# Patient Record
Sex: FEMALE | Race: WHITE | NOT HISPANIC OR LATINO | Employment: OTHER | URBAN - METROPOLITAN AREA
[De-identification: names, ages, dates, MRNs, and addresses within clinical notes are randomized per-mention and may not be internally consistent; named-entity substitution may affect disease eponyms.]

---

## 2017-01-09 ENCOUNTER — HOSPITAL ENCOUNTER (OUTPATIENT)
Dept: RADIOLOGY | Facility: HOSPITAL | Age: 52
Discharge: HOME | End: 2017-01-09
Attending: INTERNAL MEDICINE
Payer: COMMERCIAL

## 2017-01-09 ENCOUNTER — OFFICE VISIT (OUTPATIENT)
Dept: INTERNAL MEDICINE | Facility: CLINIC | Age: 52
End: 2017-01-09
Payer: COMMERCIAL

## 2017-01-09 VITALS
HEIGHT: 62 IN | BODY MASS INDEX: 31.97 KG/M2 | HEART RATE: 76 BPM | RESPIRATION RATE: 18 BRPM | DIASTOLIC BLOOD PRESSURE: 92 MMHG | SYSTOLIC BLOOD PRESSURE: 134 MMHG | WEIGHT: 173.75 LBS

## 2017-01-09 DIAGNOSIS — Z85.3 HX OF BREAST CANCER: ICD-10-CM

## 2017-01-09 DIAGNOSIS — E83.119 HEMOCHROMATOSIS, UNSPECIFIED HEMOCHROMATOSIS TYPE: ICD-10-CM

## 2017-01-09 DIAGNOSIS — E78.5 HYPERLIPIDEMIA, UNSPECIFIED HYPERLIPIDEMIA TYPE: ICD-10-CM

## 2017-01-09 DIAGNOSIS — M25.551 RIGHT HIP PAIN: Primary | ICD-10-CM

## 2017-01-09 DIAGNOSIS — F41.9 ANXIETY: ICD-10-CM

## 2017-01-09 DIAGNOSIS — G20.A1 PARKINSON DISEASE: ICD-10-CM

## 2017-01-09 DIAGNOSIS — M25.551 RIGHT HIP PAIN: ICD-10-CM

## 2017-01-09 PROCEDURE — 99999 PR PBB SHADOW E&M-EST. PATIENT-LVL III: CPT | Mod: PBBFAC,,, | Performed by: INTERNAL MEDICINE

## 2017-01-09 PROCEDURE — 73502 X-RAY EXAM HIP UNI 2-3 VIEWS: CPT | Mod: TC,RT

## 2017-01-09 PROCEDURE — 1159F MED LIST DOCD IN RCRD: CPT | Mod: S$GLB,,, | Performed by: INTERNAL MEDICINE

## 2017-01-09 PROCEDURE — 73502 X-RAY EXAM HIP UNI 2-3 VIEWS: CPT | Mod: 26,RT,, | Performed by: RADIOLOGY

## 2017-01-09 PROCEDURE — 99214 OFFICE O/P EST MOD 30 MIN: CPT | Mod: S$GLB,,, | Performed by: INTERNAL MEDICINE

## 2017-01-09 RX ORDER — ALPRAZOLAM 0.5 MG/1
0.5 TABLET ORAL NIGHTLY PRN
Qty: 30 TABLET | Refills: 0 | Status: SHIPPED | OUTPATIENT
Start: 2017-01-09 | End: 2018-01-23 | Stop reason: SDUPTHER

## 2017-01-09 NOTE — MR AVS SNAPSHOT
[facility]  [address] [facility] [address]  Phone: [phone]  Fax: [phone]                  Rachel Mann Mel   2017 1:00 PM   Office Visit    Description:  Female : 1965   Provider:  Cata Leal MD   Department:  [facility]           Reason for Visit     Follow-up     Hip Pain           Diagnoses this Visit        Comments    Right hip pain    -  Primary     Anxiety         Parkinson disease         Hyperlipidemia, unspecified hyperlipidemia type         Hx of breast cancer                To Do List           Future Appointments        Provider Department Dept Phone    2017 1:30 PM [facility] XR1 [facility] - X-Ray [phone]    2017 4:00 PM LAB, [facility] [phone]    3/21/2017 2:45 PM Julius Henry MD [facility] - Neurology [phone]    7/10/2017 10:15 AM Cata Leal MD [facility] [phone]      Goals (5 Years of Data)     None      Follow-Up and Disposition     Return in about 6 months (around 2017).       These Medications        Disp Refills Start End    alprazolam (XANAX) 0.5 MG tablet 30 tablet 0 2017     Take 1 tablet (0.5 mg total) by mouth nightly as needed. - Oral    Pharmacy: [facility][id number] - [place], [place] - [address] [facility] [address] [facility] [address] Ph #: [phone]       Notes to Pharmacy: This request is for a new prescription for a controlled substance as required by Federal/State law.      [facility]     [facility] On Call Nurse Care Line -  Assistance  Registered nurses in the [facility] provide clinical advisement, health education, appointment booking, and other advisory services.  Call for this free service at [phone].             Medications           Message regarding Medications     Verify the changes and/or additions to your medication regime listed below are the same as discussed with your clinician today.  If  "any of these changes or additions are incorrect, please notify your healthcare provider.        STOP taking these medications     rotigotine (NEUPRO) 1 mg/24 hour PT24 Place 1 patch onto the skin once daily.           Verify that the below list of medications is an accurate representation of the medications you are currently taking.  If none reported, the list may be blank. If incorrect, please contact your healthcare provider. Carry this list with you in case of emergency.           Current Medications     alprazolam (XANAX) 0.5 MG tablet Take 1 tablet (0.5 mg total) by mouth nightly as needed.    anastrozole (ARIMIDEX) 1 mg Tab Take 1 mg by mouth once daily.    carbidopa-levodopa  mg (SINEMET)  mg per tablet Take one in the am for 3 weeks, then one BID    denosumab (XGEVA) 120 mg/1.7 mL (70 mg/mL) Soln Inject 120 mg into the skin. Every 3 months    OMEGA-3S/DHA/EPA/FISH OIL (OMEGA 3 ORAL) Take 2 tablets by mouth once daily.    UNABLE TO FIND Matrix 5000 mcg for hair, skin and nails.   Take .5 tablet daily    venlafaxine (EFFEXOR) 75 MG tablet Take 75 mg by mouth once daily.    vitamin D 1000 units Tab Take 2,000 mg by mouth once daily.           Clinical Reference Information           Vital Signs - Last Recorded  Most recent update: 1/9/2017  1:02 PM by Laurel Keene LPN    BP Pulse Resp Ht Wt LMP    (!) 134/92 76 18 5' 2" (1.575 m) 78.8 kg (173 lb 11.6 oz) 01/17/2012    BMI                31.77 kg/m2          Blood Pressure          Most Recent Value    BP  (!)  134/92      Allergies as of 1/9/2017     Neupro [Rotigotine]      Immunizations Administered on Date of Encounter - 1/9/2017     None      Orders Placed During Today's Visit     Future Labs/Procedures Expected by Expires    CBC auto differential  1/9/2017 (Approximate) 2/8/2017    Comprehensive metabolic panel  1/9/2017 (Approximate) 2/8/2017    Lipid panel  1/9/2017 (Approximate) 2/8/2017    T4, free  1/9/2017 (Approximate) 2/8/2017    " TSH  1/9/2017 (Approximate) 2/8/2017    Vitamin B12  1/9/2017 3/10/2018    Vitamin D  1/9/2017 3/10/2018    X-Ray Hip 2 View Right  1/9/2017 1/9/2018

## 2017-01-09 NOTE — PROGRESS NOTES
Subjective:       Patient ID: Rachel Leal is a 51 y.o. female.    Chief Complaint: Follow-up and Hip Pain (right hip and back pain only while sleeping.)      HPI:  Patient is known to me and presents for follow up DNA positive hemochromotosis, h/o breast cancer and anxiety.  Labs will be done today.    Today she c/o right hip and back pain. Only occurs while sleeping. Pain located on low right back. Once she starts walking the pain gets better.    H/o breast cancer with metastasis to bone (stage VI at diagnosis): dx 2013. Follows with Dr. Gerardo. On arimidex and xgeva; s/p left mastectomy. Doing CT scans with oncology, no MMG. she did follow with her oncologist recently. Did bone scan and no mets int he right hip. Pain is burning and stabbing sensation. Intermittent. Pain lasts for several minutes until she moves. She uses Aleve occasionally which is helpful.    Anxiety: Effexor and Xanax currently. Mood is well controlled. Denies depressed mood. Denies SI. Using Xanax 2-3x a week for sleep.     HLD: taking omega 3 OTC. Last cholesterol was good. Will repeat today.    Parkinson's: she is taking sinement. She was given neuopro but resulted in cellulitis that responded well to antibx given at urgent care. Stopped the patch and hasn't had any further reactions.     Hemochromotosis: diagnosed with positive DNA analysis but no sympotms. Ferritin 396 9/2015 (reviewed and discussed with patient today). She does follow with hepatology. Last US 12/2015 showed fatty infiltration otherwise normal. Last AST/ALT normal, normal bilirubin. No history of blood sugar problems.    Past Medical History   Diagnosis Date    Cancer      breast stage iv    Hereditary hemochromatosis 12/16/2015    History of TMJ syndrome     Hyperlipidemia     Hypoglycemia        Family History   Problem Relation Age of Onset    Colon cancer Mother     Diabetes Father     Heart disease Father     Hemochromatosis Brother     Breast cancer  Neg Hx     Ovarian cancer Neg Hx        Social History     Social History    Marital status:      Spouse name: N/A    Number of children: N/A    Years of education: N/A     Occupational History    Not on file.     Social History Main Topics    Smoking status: Former Smoker     Packs/day: 0.25     Types: Cigarettes     Quit date: 9/12/2013    Smokeless tobacco: Never Used    Alcohol use 6.0 oz/week     12 Standard drinks or equivalent per week      Comment: socially on weekends    Drug use: No    Sexual activity: Yes     Partners: Male     Birth control/ protection: Surgical      Comment: .     Other Topics Concern    Not on file     Social History Narrative       Review of Systems   Constitutional: Negative for activity change, fatigue, fever and unexpected weight change.   HENT: Negative for congestion, ear pain, hearing loss, rhinorrhea and sore throat.    Eyes: Negative for pain, redness and visual disturbance.   Respiratory: Negative for cough, shortness of breath and wheezing.    Cardiovascular: Negative for chest pain, palpitations and leg swelling.   Gastrointestinal: Negative for abdominal pain, constipation, diarrhea, nausea and vomiting.   Genitourinary: Negative for dysuria, frequency, pelvic pain and urgency. Vaginal pain: right hip pain.   Musculoskeletal: Positive for arthralgias and back pain. Negative for joint swelling and neck pain.   Skin: Negative for color change, rash and wound.   Neurological: Negative for dizziness, tremors, weakness, light-headedness and headaches.         Objective:      Physical Exam   Constitutional: She is oriented to person, place, and time. She appears well-developed and well-nourished. No distress.   HENT:   Head: Normocephalic and atraumatic.   Right Ear: External ear normal.   Left Ear: External ear normal.   Eyes: Conjunctivae and EOM are normal. Pupils are equal, round, and reactive to light. Right eye exhibits no discharge. Left eye  exhibits no discharge.   Neck: Neck supple. No tracheal deviation present.   Cardiovascular: Normal rate and regular rhythm.  Exam reveals no gallop and no friction rub.    No murmur heard.  Pulmonary/Chest: Effort normal and breath sounds normal. No respiratory distress. She has no wheezes. She has no rales.   Abdominal: Soft. Bowel sounds are normal. She exhibits no distension. There is no tenderness.   Musculoskeletal: She exhibits no edema, tenderness or deformity.   Neurological: She is alert and oriented to person, place, and time. No cranial nerve deficit.   Skin: Skin is warm and dry.   Psychiatric: She has a normal mood and affect. Her behavior is normal.   Vitals reviewed.      Assessment:       1. Right hip pain    2. Anxiety    3. Parkinson disease    4. Hyperlipidemia, unspecified hyperlipidemia type    5. Hx of breast cancer    6. Hemochromatosis, unspecified hemochromatosis type        Plan:       Rachel was seen today for follow-up and hip pain.    Diagnoses and all orders for this visit:    Right hip pain  -     X-Ray Hip 2 View Right; Future  New, worsening  Start with x-ray  Responsive somewhat to NSAIDS  If x-ray negative consider MRI L-spine carlos given cancer history but had recent bone scan which was negative so less likely     Anxiety  -     TSH; Future  -     T4, free; Future  -     alprazolam (XANAX) 0.5 MG tablet; Take 1 tablet (0.5 mg total) by mouth nightly as needed.  Continue effexor  Continue PRN Xanax  Well controlled, chronic    Parkinson disease  -     CBC auto differential; Future  -     Comprehensive metabolic panel; Future  -     TSH; Future  -     Lipid panel; Future  -     T4, free; Future  -     Vitamin B12; Future  -     Vitamin D; Future  Chronic, controlled  Follows with neurology  Does report some fatigue and memory changes---likely all realted to parkinsons  Check labs  Continue sinemet  Reaction to neuropro patch, requip not tolerated in the past    Hyperlipidemia,  unspecified hyperlipidemia type  -     CBC auto differential; Future  -     Comprehensive metabolic panel; Future  -     TSH; Future  -     Lipid panel; Future  -     T4, free; Future  Chronic, controlled  Continue omega 3  Check labs    Hx of breast cancer  -     CBC auto differential; Future  -     Comprehensive metabolic panel; Future  -     TSH; Future  -     Lipid panel; Future  -     T4, free; Future  -     Vitamin B12; Future  -     Vitamin D; Future  Follows with oncology  Continue armidex and xgeva    Hemochromatosis, unspecified hemochromatosis type  -     CBC auto differential; Future  -     Comprehensive metabolic panel; Future  Family history  Follows with hepatology  Check labs including ferritin       Health Maintenance:  -CRC: age 49 (mom and grandmother with CRC). Had polyps, unsure what kind. Done 8/2016  -PAP: s/p hysterectomy  -MMG: s/p left mastectomy, doing CT for screening  -tobacco: reports occasional use  -Vaccines: flu declines    RTC 6 months for follow up, no labs if today's labs are normal.

## 2017-01-10 DIAGNOSIS — E78.5 HYPERLIPIDEMIA, UNSPECIFIED HYPERLIPIDEMIA TYPE: Primary | ICD-10-CM

## 2017-01-10 RX ORDER — ATORVASTATIN CALCIUM 20 MG/1
20 TABLET, FILM COATED ORAL DAILY
Qty: 30 TABLET | Refills: 5 | Status: SHIPPED | OUTPATIENT
Start: 2017-01-10 | End: 2017-03-16 | Stop reason: SDUPTHER

## 2017-01-13 RX ORDER — CARBIDOPA AND LEVODOPA 25; 100 MG/1; MG/1
1 TABLET ORAL 2 TIMES DAILY
Qty: 180 TABLET | Refills: 4 | Status: SHIPPED | OUTPATIENT
Start: 2017-01-13 | End: 2018-04-07 | Stop reason: SDUPTHER

## 2017-03-08 ENCOUNTER — HOSPITAL ENCOUNTER (OUTPATIENT)
Dept: RADIOLOGY | Facility: HOSPITAL | Age: 52
Discharge: HOME | End: 2017-03-08
Attending: INTERNAL MEDICINE
Payer: COMMERCIAL

## 2017-03-08 DIAGNOSIS — Z12.31 ENCOUNTER FOR SCREENING MAMMOGRAM FOR MALIGNANT NEOPLASM OF BREAST: ICD-10-CM

## 2017-03-08 PROCEDURE — 77067 SCR MAMMO BI INCL CAD: CPT | Mod: TC

## 2017-03-08 PROCEDURE — 77067 SCR MAMMO BI INCL CAD: CPT | Mod: 26,,, | Performed by: RADIOLOGY

## 2017-03-08 PROCEDURE — 77063 BREAST TOMOSYNTHESIS BI: CPT | Mod: 26,,, | Performed by: RADIOLOGY

## 2017-03-16 DIAGNOSIS — E78.5 HYPERLIPIDEMIA, UNSPECIFIED HYPERLIPIDEMIA TYPE: ICD-10-CM

## 2017-03-17 RX ORDER — ATORVASTATIN CALCIUM 20 MG/1
20 TABLET, FILM COATED ORAL DAILY
Qty: 90 TABLET | Refills: 3 | Status: SHIPPED | OUTPATIENT
Start: 2017-03-17 | End: 2017-09-25

## 2017-03-21 ENCOUNTER — OFFICE VISIT (OUTPATIENT)
Dept: NEUROLOGY | Facility: CLINIC | Age: 52
End: 2017-03-21
Payer: COMMERCIAL

## 2017-03-21 VITALS
SYSTOLIC BLOOD PRESSURE: 118 MMHG | DIASTOLIC BLOOD PRESSURE: 72 MMHG | WEIGHT: 171.5 LBS | RESPIRATION RATE: 16 BRPM | HEIGHT: 62 IN | BODY MASS INDEX: 31.56 KG/M2 | HEART RATE: 78 BPM

## 2017-03-21 DIAGNOSIS — G20.A1 PARKINSON DISEASE: Primary | ICD-10-CM

## 2017-03-21 DIAGNOSIS — G56.01 CARPAL TUNNEL SYNDROME OF RIGHT WRIST: ICD-10-CM

## 2017-03-21 PROCEDURE — 1160F RVW MEDS BY RX/DR IN RCRD: CPT | Mod: S$GLB,,, | Performed by: PSYCHIATRY & NEUROLOGY

## 2017-03-21 PROCEDURE — 99999 PR PBB SHADOW E&M-EST. PATIENT-LVL III: CPT | Mod: PBBFAC,,, | Performed by: PSYCHIATRY & NEUROLOGY

## 2017-03-21 PROCEDURE — 99214 OFFICE O/P EST MOD 30 MIN: CPT | Mod: S$GLB,,, | Performed by: PSYCHIATRY & NEUROLOGY

## 2017-03-21 NOTE — MR AVS SNAPSHOT
[facility]  [address] [facility] [address]  Phone: [phone]  Fax: [phone]                  Rachel Mann Mel   3/21/2017 2:45 PM   Office Visit    Description:  Female : 1965   Provider:  Julius Henry MD   Department:  [facility]           Reason for Visit     Tremors           Diagnoses this Visit        Comments    Parkinson disease    -  Primary     Carpal tunnel syndrome of right wrist                To Do List           Future Appointments        Provider Department Dept Phone    7/10/2017 10:15 AM Cata Leal MD [facility] - Internal Medicine [phone]      Goals (5 Years of Data)     None      Follow-Up and Disposition     Return in about 6 months (around 2017).      [facility]     [facility] On [facility] Nurse Care Line -  Assistance  Registered nurses in the [facility] On Call Center provide clinical advisement, health education, appointment booking, and other advisory services.  Call for this free service at [phone].             Medications           Message regarding Medications     Verify the changes and/or additions to your medication regime listed below are the same as discussed with your clinician today.  If any of these changes or additions are incorrect, please notify your healthcare provider.        STOP taking these medications     UNABLE TO FIND Matrix 5000 mcg for hair, skin and nails.   Take .5 tablet daily           Verify that the below list of medications is an accurate representation of the medications you are currently taking.  If none reported, the list may be blank. If incorrect, please contact your healthcare provider. Carry this list with you in case of emergency.           Current Medications     alprazolam (XANAX) 0.5 MG tablet Take 1 tablet (0.5 mg total) by mouth nightly as needed.    anastrozole (ARIMIDEX) 1 mg Tab Take 1 mg by mouth once daily.    atorvastatin (LIPITOR) 20 MG tablet  "Take 1 tablet (20 mg total) by mouth once daily.    carbidopa-levodopa  mg (SINEMET)  mg per tablet Take 1 tablet by mouth 2 (two) times daily.    denosumab (XGEVA) 120 mg/1.7 mL (70 mg/mL) Soln Inject 120 mg into the skin. Every 3 months    OMEGA-3S/DHA/EPA/FISH OIL (OMEGA 3 ORAL) Take 2 tablets by mouth once daily.    venlafaxine (EFFEXOR) 75 MG tablet Take 75 mg by mouth once daily.    vitamin D 1000 units Tab Take 2,000 mg by mouth once daily.           Clinical Reference Information           Your Vitals Were     BP Pulse Resp Height Weight Last Period    118/72 (BP Location: Left arm, Patient Position: Sitting, BP Method: Manual) 78 16 5' 2" (1.575 m) 77.8 kg (171 lb 8.3 oz) 01/17/2012    BMI                31.37 kg/m2          Blood Pressure          Most Recent Value    BP  118/72      Allergies as of 3/21/2017     Neupro [Rotigotine]      Immunizations Administered on Date of Encounter - 3/21/2017     None      Orders Placed During Today's Visit      Normal Orders This Visit    Ambulatory consult to Neurology       Language Assistance Services     ATTENTION: Language assistance services are available, free of charge. Please call [phone].      ATENCIÓN: Si daron chen, tiene a barnett disposición servicios gratuitos de asistencia lingüística. Llame al [phone].     [facility] Ý: N?u b?n nói Ti?ng Vi?t, có các d?ch v? h? tr? ngôn ng? mi?n phí dành cho b?n. G?i s? [phone].         [facility] complies with applicable Federal civil rights laws and does not discriminate on the basis of race, color, national origin, age, disability, or sex.        "

## 2017-03-21 NOTE — PROGRESS NOTES
HPI: Rachel Leal is a 51 y.o. female with C spine MRI showed mild degenerative changes but some NF narrowing by Xray.  EMG showed Left Carpal tunnel syndrome (mild to moderate) and  Borderline/ Early Carpal Tunnel Syndrome on the Right now s/p Left CTR  And over 3 years of right arm jumping after stretching and sometimes decreased strength/fine movement -- progressing to right and arm leg tremor and right bradykinesia all suggesting Parkinson's disease.   Since the last visit, Neupro patch caused cellulitis. She moved the patch and had symptoms that was diagnosed as cellulitis in a different location.   She is using sinemet BID and feels tremor and other symptoms are well controlled currently.  No CTS symptoms and neck pain is well controlled.  Some fatigue    Review of Systems   Constitutional: Negative for fever.   HENT: Negative for nosebleeds.    Eyes: Negative for double vision.   Respiratory: Negative for shortness of breath.    Cardiovascular: Negative for leg swelling.   Gastrointestinal: Negative for constipation.   Genitourinary: Negative for hematuria.   Musculoskeletal: Negative for falls.   Skin: Negative for rash.   Neurological: Positive for tremors.   Psychiatric/Behavioral: Negative for memory loss.           Exam:  Gen Appearance, well developed/nourished in no apparent distress  CV: 2+ distal pulses with no edema or swelling  Neuro:  MS: Awake, alert,  Sustains attention. Recent/remote memory intact, Language is full to spontaneous speech/comprehension. Fund of Knowledge is full  CN: Optic discs are flat with normal vasculature, PERRL, Extraoccular movements and visual fields are full. Normal facial sensation and strength,  Tongue and Palate are midline and strong. Shoulder Shrug symmetric and strong.  Motor: Normal bulk, tone is increased with mild rigidity on the right arm. Very fleeting resting tremor seen on last exam is not noted today,  She is clearly more bradykinetic on the right  with finger tapping . 5/5 strength bilateral upper/lower extremities with 2+ reflexes   Sensory: symmetric to  temp, and vibration.  Romberg negative  Cerebellar: Finger-nose, Rapid alternating movements intact  Gait: Normal stance, no ataxia and no enbloc turning but reduced arm swing on the right. No shuffling and good postural reflexes.         EMG 2014:   1. Left Carpal tunnel syndrome (mild to moderate)  2. Borderline/ Early Carpal Tunnel Syndrome on the Right      12/2015 MRI brain : No significant abnormalities.   Some mild white matter disease, very mild        Assessment/Plan: Rachel Leal is a 51 y.o. female with C spine MRI showed mild degenerative changes but some NF narrowing by Xray.  EMG showed Left Carpal tunnel syndrome (mild to moderate) and  Borderline/ Early Carpal Tunnel Syndrome on the Right now s/p Left CTR  And over 3 years of right arm jumping after stretching and sometimes decreased strength/fine movement -- progressing to right and arm leg tremor and right bradykinesia all suggesting Parkinson's disease.   I recommend:     1. MRI brain reassuring 2015. Strong family history of Parkinsonism noted.   2. Mirapex and Requip caused elevated BP, she could not tolerate neuopro patch due to skin reaction  3.  Sinemet is well tolerated but not an ideal long term treatment as she will be at risk of dyskinesia (reviewed today).   4. See movement disorders specialist suggested given her intolerance to DA and young onset PD symptoms.   5. Her Cervical neck pain is also resolved and her CTS Symptoms on the right are not active    RTC in 6 months

## 2017-07-10 ENCOUNTER — LAB VISIT (OUTPATIENT)
Dept: LAB | Facility: HOSPITAL | Age: 52
End: 2017-07-10
Attending: INTERNAL MEDICINE
Payer: COMMERCIAL

## 2017-07-10 ENCOUNTER — OFFICE VISIT (OUTPATIENT)
Dept: INTERNAL MEDICINE | Facility: CLINIC | Age: 52
End: 2017-07-10
Payer: COMMERCIAL

## 2017-07-10 VITALS
SYSTOLIC BLOOD PRESSURE: 168 MMHG | BODY MASS INDEX: 32.41 KG/M2 | TEMPERATURE: 98 F | RESPIRATION RATE: 18 BRPM | HEIGHT: 62 IN | HEART RATE: 72 BPM | WEIGHT: 176.13 LBS | DIASTOLIC BLOOD PRESSURE: 92 MMHG

## 2017-07-10 DIAGNOSIS — F41.9 ANXIETY: Primary | ICD-10-CM

## 2017-07-10 DIAGNOSIS — E78.5 HYPERLIPIDEMIA, UNSPECIFIED HYPERLIPIDEMIA TYPE: ICD-10-CM

## 2017-07-10 DIAGNOSIS — G20.A1 PARKINSON DISEASE: ICD-10-CM

## 2017-07-10 DIAGNOSIS — E83.119 HEMOCHROMATOSIS, UNSPECIFIED HEMOCHROMATOSIS TYPE: ICD-10-CM

## 2017-07-10 DIAGNOSIS — M25.551 RIGHT HIP PAIN: ICD-10-CM

## 2017-07-10 LAB
ALBUMIN SERPL BCP-MCNC: 3.7 G/DL
ALP SERPL-CCNC: 72 U/L
ALT SERPL W/O P-5'-P-CCNC: 27 U/L
ANION GAP SERPL CALC-SCNC: 8 MMOL/L
AST SERPL-CCNC: 25 U/L
BILIRUB SERPL-MCNC: 0.3 MG/DL
BUN SERPL-MCNC: 20 MG/DL
CALCIUM SERPL-MCNC: 8.2 MG/DL
CHLORIDE SERPL-SCNC: 108 MMOL/L
CHOLEST/HDLC SERPL: 2.8 {RATIO}
CO2 SERPL-SCNC: 23 MMOL/L
CREAT SERPL-MCNC: 0.7 MG/DL
EST. GFR  (AFRICAN AMERICAN): >60 ML/MIN/1.73 M^2
EST. GFR  (NON AFRICAN AMERICAN): >60 ML/MIN/1.73 M^2
GLUCOSE SERPL-MCNC: 95 MG/DL
HDL/CHOLESTEROL RATIO: 36.4 %
HDLC SERPL-MCNC: 176 MG/DL
HDLC SERPL-MCNC: 64 MG/DL
LDLC SERPL CALC-MCNC: 91 MG/DL
NONHDLC SERPL-MCNC: 112 MG/DL
POTASSIUM SERPL-SCNC: 4.1 MMOL/L
PROT SERPL-MCNC: 7.1 G/DL
SODIUM SERPL-SCNC: 139 MMOL/L
TRIGL SERPL-MCNC: 105 MG/DL

## 2017-07-10 PROCEDURE — 99999 PR PBB SHADOW E&M-EST. PATIENT-LVL III: CPT | Mod: PBBFAC,,, | Performed by: INTERNAL MEDICINE

## 2017-07-10 PROCEDURE — 36415 COLL VENOUS BLD VENIPUNCTURE: CPT

## 2017-07-10 PROCEDURE — 80061 LIPID PANEL: CPT

## 2017-07-10 PROCEDURE — 80053 COMPREHEN METABOLIC PANEL: CPT

## 2017-07-10 PROCEDURE — 99214 OFFICE O/P EST MOD 30 MIN: CPT | Mod: S$GLB,,, | Performed by: INTERNAL MEDICINE

## 2017-07-10 RX ORDER — GABAPENTIN 100 MG/1
100 CAPSULE ORAL NIGHTLY
Qty: 30 CAPSULE | Refills: 3 | Status: SHIPPED | OUTPATIENT
Start: 2017-07-10 | End: 2017-09-25

## 2017-07-10 NOTE — PROGRESS NOTES
Subjective:       Patient ID: Rachle Leal is a 52 y.o. female.    Chief Complaint: Follow-up      HPI:  Patient is known to me and presents for follow up DNA positive hemochromotosis, h/o breast cancer and anxiety.  Labs done today but not yet resulted.     Today she c/o right hip and back pain. Only occurs while sleeping. Pain located on right hip. Taking aleve without much relief. X-rays showed mild arthritis. Pain is not worse with walking, only occurs at night. Layin antolin either side causes pain. Beter with pillow between the knees. Started seeing chiropractor and her low back pain is better.     H/o breast cancer with metastasis to bone (stage VI at diagnosis): dx 2013. Follows with Dr. Gerardo. On arimidex and xgeva; s/p left mastectomy. Doing CT scans with oncology, no MMG. she did follow with her oncologist recently. Did bone scan and no mets in the right hip.     Anxiety: Effexor and Xanax currently. Mood is well controlled. Denies depressed mood. Denies SI. Using Xanax very sparingly     HLD: taking omega 3 OTC. Last cholesterol was good. Will repeat today--pending.     Parkinson's: she is taking sinement. She was given neuopro but resulted in cellulitis that responded well to antibx given at urgent care. Stopped the patch and hasn't had any further reactions. Will see Dr. Henry again soon.     Hemochromotosis: diagnosed with positive DNA analysis but no sympotms. Ferritin 396 9/2015 (reviewed and discussed with patient today). She does follow with hepatology. Last US 12/2015 showed fatty infiltration otherwise normal. Last AST/ALT normal, normal bilirubin. No history of blood sugar problems. Labs pending today.     Past Medical History:   Diagnosis Date    Cancer     breast stage iv    Hereditary hemochromatosis 12/16/2015    History of TMJ syndrome     Hyperlipidemia     Hypoglycemia        Family History   Problem Relation Age of Onset    Colon cancer Mother     Diabetes Father      Heart disease Father     Hemochromatosis Brother     Breast cancer Neg Hx     Ovarian cancer Neg Hx        Social History     Social History    Marital status:      Spouse name: N/A    Number of children: N/A    Years of education: N/A     Occupational History    Not on file.     Social History Main Topics    Smoking status: Former Smoker     Packs/day: 0.25     Types: Cigarettes     Quit date: 9/12/2013    Smokeless tobacco: Never Used    Alcohol use 6.0 oz/week     12 Standard drinks or equivalent per week      Comment: socially on weekends    Drug use: No    Sexual activity: Yes     Partners: Male     Birth control/ protection: Surgical      Comment: .     Other Topics Concern    Not on file     Social History Narrative    No narrative on file       Review of Systems   Constitutional: Negative for activity change, fatigue, fever and unexpected weight change.   HENT: Negative for congestion, ear pain, hearing loss, rhinorrhea and sore throat.    Eyes: Negative for pain, redness and visual disturbance.   Respiratory: Negative for cough, shortness of breath and wheezing.    Cardiovascular: Negative for chest pain, palpitations and leg swelling.   Gastrointestinal: Negative for abdominal pain, constipation, diarrhea, nausea and vomiting.   Genitourinary: Negative for dysuria, frequency and urgency.   Musculoskeletal: Positive for arthralgias (right hip). Negative for back pain, joint swelling and neck pain.   Skin: Negative for color change, rash and wound.   Neurological: Negative for dizziness, tremors, weakness, light-headedness and headaches.         Objective:      Physical Exam   Constitutional: She is oriented to person, place, and time. She appears well-developed and well-nourished. No distress.   HENT:   Head: Normocephalic and atraumatic.   Right Ear: External ear normal.   Left Ear: External ear normal.   Eyes: Conjunctivae and EOM are normal. Pupils are equal, round, and  reactive to light.   Neck: Neck supple. No tracheal deviation present.   Cardiovascular: Normal rate and regular rhythm.  Exam reveals no gallop and no friction rub.    No murmur heard.  Pulmonary/Chest: Effort normal and breath sounds normal. No respiratory distress. She has no wheezes. She has no rales.   Abdominal: Soft. Bowel sounds are normal. She exhibits no distension. There is no tenderness.   Musculoskeletal: She exhibits no tenderness or deformity.   Neurological: She is alert and oriented to person, place, and time. No cranial nerve deficit.   Skin: Skin is warm and dry.   Psychiatric: She has a normal mood and affect. Her behavior is normal.   Vitals reviewed.      Assessment:       1. Anxiety    2. Parkinson disease    3. Hemochromatosis, unspecified hemochromatosis type    4. Hyperlipidemia, unspecified hyperlipidemia type    5. Right hip pain        Plan:       Rachel was seen today for follow-up.    Diagnoses and all orders for this visit:    Anxiety  -     Comprehensive metabolic panel; Future  -     Lipid panel; Future  -     CBC auto differential; Future  Chronic, controlled  Cont effexor at same dose  Using Xanax sparingly, will cont at same dose    Parkinson disease  Chronic  Follows with Dr. Henry  Cont sinemet  Was referred to specialist    Hemochromatosis, unspecified hemochromatosis type  -     Comprehensive metabolic panel; Future  -     Lipid panel; Future  -     CBC auto differential; Future  Chronic, controlled  Follows with hepatology    Hyperlipidemia, unspecified hyperlipidemia type  -     Comprehensive metabolic panel; Future  -     Lipid panel; Future  -     CBC auto differential; Future  Chronic controlled  Cont fish oil  Follow labs    Right hip pain  -     gabapentin (NEURONTIN) 100 MG capsule; Take 1 capsule (100 mg total) by mouth every evening.  Unresolved  Sounds like could be nerve pain  X-ray showed mild arthritis but no pain with activity and no response to  NSAIDs  Trial of low dose gabapentin at night. Call me if not effective and will titrate dose          Health Maintenance:  -CRC: age 49 (mom and grandmother with CRC). Had polyps, unsure what kind. Done 8/2016  -PAP: s/p hysterectomy  -MMG: s/p left mastectomy, doing CT for screening  -tobacco: reports occasional use  -Vaccines: flu declines    RTC 6 months with labs and PRN

## 2017-07-11 DIAGNOSIS — E83.51 HYPOCALCEMIA: Primary | ICD-10-CM

## 2017-07-19 ENCOUNTER — CLINICAL SUPPORT (OUTPATIENT)
Dept: INTERNAL MEDICINE | Facility: CLINIC | Age: 52
End: 2017-07-19
Payer: COMMERCIAL

## 2017-07-19 DIAGNOSIS — E83.51 HYPOCALCEMIA: ICD-10-CM

## 2017-07-19 LAB
25(OH)D3+25(OH)D2 SERPL-MCNC: 73 NG/ML
MAGNESIUM SERPL-MCNC: 2.2 MG/DL
PHOSPHATE SERPL-MCNC: 3.9 MG/DL
PTH-INTACT SERPL-MCNC: 17 PG/ML

## 2017-07-19 PROCEDURE — 84100 ASSAY OF PHOSPHORUS: CPT

## 2017-07-19 PROCEDURE — 83735 ASSAY OF MAGNESIUM: CPT

## 2017-07-19 PROCEDURE — 36415 COLL VENOUS BLD VENIPUNCTURE: CPT | Mod: S$GLB,,, | Performed by: NURSE PRACTITIONER

## 2017-07-19 PROCEDURE — 82306 VITAMIN D 25 HYDROXY: CPT

## 2017-07-19 PROCEDURE — 83970 ASSAY OF PARATHORMONE: CPT

## 2017-09-25 ENCOUNTER — OFFICE VISIT (OUTPATIENT)
Dept: NEUROLOGY | Facility: CLINIC | Age: 52
End: 2017-09-25
Payer: COMMERCIAL

## 2017-09-25 VITALS
WEIGHT: 182.31 LBS | DIASTOLIC BLOOD PRESSURE: 102 MMHG | SYSTOLIC BLOOD PRESSURE: 146 MMHG | HEART RATE: 72 BPM | HEIGHT: 62 IN | BODY MASS INDEX: 33.55 KG/M2 | RESPIRATION RATE: 16 BRPM

## 2017-09-25 DIAGNOSIS — G20.A1 PARKINSON DISEASE: Primary | ICD-10-CM

## 2017-09-25 DIAGNOSIS — R03.0 BLOOD PRESSURE ELEVATED WITHOUT HISTORY OF HTN: ICD-10-CM

## 2017-09-25 DIAGNOSIS — G56.01 CARPAL TUNNEL SYNDROME OF RIGHT WRIST: ICD-10-CM

## 2017-09-25 PROCEDURE — 3008F BODY MASS INDEX DOCD: CPT | Mod: S$GLB,,, | Performed by: PSYCHIATRY & NEUROLOGY

## 2017-09-25 PROCEDURE — 99214 OFFICE O/P EST MOD 30 MIN: CPT | Mod: S$GLB,,, | Performed by: PSYCHIATRY & NEUROLOGY

## 2017-09-25 PROCEDURE — 99999 PR PBB SHADOW E&M-EST. PATIENT-LVL III: CPT | Mod: PBBFAC,,, | Performed by: PSYCHIATRY & NEUROLOGY

## 2017-09-25 NOTE — PROGRESS NOTES
HPI:  Rachel Leal is a 52 y.o. female with C spine MRI showed mild degenerative changes but some NF narrowing by Xray.  EMG showed Left Carpal tunnel syndrome (mild to moderate) and  Borderline/ Early Carpal Tunnel Syndrome on the Right now s/p Left CTR  And over 3 years of right arm jumping after stretching and sometimes decreased strength/fine movement -- progressing to right and arm leg tremor and right bradykinesia all suggesting Parkinson's disease.   She states since the last visit, she is using sinemet just once daily. This helps her night time shaking the most if needed twice daily. Her walking is good and she is sleeping ok and uses Xanax rarely PRN per her PCP. She does not have any constipation.   She takes an MVI along with B12 and calcium.  She has questions about dietary supplements for which we discussed are not FDA approved and may worsen her tremor if they contain a stimulant.   She does not have cervical neck pain and no symptoms of CTS  Patient states her BP is an outlier again today due to poor sleep    Review of Systems   Constitutional: Negative for fever.   HENT: Negative for nosebleeds.    Eyes: Negative for double vision.   Respiratory: Negative for shortness of breath.    Cardiovascular: Negative for leg swelling.   Gastrointestinal: Negative for blood in stool and constipation.   Genitourinary: Negative for hematuria.   Musculoskeletal: Negative for falls.   Skin: Negative for rash.   Neurological: Positive for tremors.   Psychiatric/Behavioral: The patient does not have insomnia.            Exam:  Gen Appearance, well developed/nourished in no apparent distress  CV: 2+ distal pulses with no edema or swelling  Neuro:  MS: Awake, alert,  Sustains attention. Recent/remote memory intact, Language is full to spontaneous speech/comprehension. Fund of Knowledge is full  CN: Optic discs are flat with normal vasculature, PERRL, Extraoccular movements and visual fields are full. Normal  facial sensation and strength,  Tongue and Palate are midline and strong. Shoulder Shrug symmetric and strong.  Motor: Normal bulk, tone is increased with mild rigidity on the right arm. Very fleeting resting tremor seen on last exam is not noted today,  She has  more bradykinetic on the right with finger tapping than left- mildly improved on sinemet . 5/5 strength bilateral upper/lower extremities with 2+ reflexes   Sensory: symmetric to  temp, and vibration.  Romberg negative  Cerebellar: Finger-nose, Rapid alternating movements intact  Gait: Normal stance, no ataxia and no enbloc turning but reduced arm swing on the right. No shuffling and good postural reflexes.         EMG 2014:   1. Left Carpal tunnel syndrome (mild to moderate)  2. Borderline/ Early Carpal Tunnel Syndrome on the Right      12/2015 MRI brain : No significant abnormalities.   Some mild white matter disease, very mild        Assessment/Plan: Rachel Leal is a 52 y.o. female with C spine MRI showed mild degenerative changes but some NF narrowing by Xray.  EMG showed Left Carpal tunnel syndrome (mild to moderate) and  Borderli  ne/ Early Carpal Tunnel Syndrome on the Right now s/p Left CTR  And over 3 years of right arm jumping after stretching and sometimes decreased strength/fine movement -- progressing to right and arm leg tremor and right bradykinesia all suggesting Parkinson's disease.   I recommend:     1. MRI brain reassuring 2015. Strong family history of Parkinsonism noted.   2. Mirapex and Requip caused elevated BP, she could not tolerate neuopro patch due to skin reaction. Hesitant to start Azilect at this time given her side effects prior.   3.  Sinemet is well tolerated but not an ideal long term treatment as she will be at risk of dyskinesia (reviewed today).   4. See movement disorders specialist suggested given her intolerance to DA and young onset PD symptoms- consult again placed today.   5. Her Cervical neck pain is also  resolved and her CTS Symptoms on the right are not active  6. Monitor BP at home and see PCP if this remains elevated.   RTC in 4 months

## 2017-09-29 ENCOUNTER — OFFICE VISIT (OUTPATIENT)
Dept: INTERNAL MEDICINE | Facility: CLINIC | Age: 52
End: 2017-09-29
Payer: COMMERCIAL

## 2017-09-29 ENCOUNTER — LAB VISIT (OUTPATIENT)
Dept: LAB | Facility: HOSPITAL | Age: 52
End: 2017-09-29
Attending: INTERNAL MEDICINE
Payer: COMMERCIAL

## 2017-09-29 VITALS
DIASTOLIC BLOOD PRESSURE: 82 MMHG | HEART RATE: 79 BPM | BODY MASS INDEX: 32.33 KG/M2 | RESPIRATION RATE: 18 BRPM | WEIGHT: 175.69 LBS | HEIGHT: 62 IN | SYSTOLIC BLOOD PRESSURE: 136 MMHG

## 2017-09-29 DIAGNOSIS — R03.0 BLOOD PRESSURE ELEVATED WITHOUT HISTORY OF HTN: ICD-10-CM

## 2017-09-29 DIAGNOSIS — E78.5 HYPERLIPIDEMIA, UNSPECIFIED HYPERLIPIDEMIA TYPE: Primary | ICD-10-CM

## 2017-09-29 DIAGNOSIS — E78.5 HYPERLIPIDEMIA, UNSPECIFIED HYPERLIPIDEMIA TYPE: ICD-10-CM

## 2017-09-29 LAB
ALBUMIN SERPL BCP-MCNC: 3.6 G/DL
ALP SERPL-CCNC: 63 U/L
ALT SERPL W/O P-5'-P-CCNC: 43 U/L
ANION GAP SERPL CALC-SCNC: 8 MMOL/L
AST SERPL-CCNC: 31 U/L
BASOPHILS # BLD AUTO: 0.01 K/UL
BASOPHILS NFR BLD: 0.3 %
BILIRUB SERPL-MCNC: 0.3 MG/DL
BUN SERPL-MCNC: 13 MG/DL
CALCIUM SERPL-MCNC: 9.2 MG/DL
CHLORIDE SERPL-SCNC: 109 MMOL/L
CHOLEST SERPL-MCNC: 271 MG/DL
CHOLEST/HDLC SERPL: 5.3 {RATIO}
CO2 SERPL-SCNC: 26 MMOL/L
CREAT SERPL-MCNC: 0.7 MG/DL
DIFFERENTIAL METHOD: ABNORMAL
EOSINOPHIL # BLD AUTO: 0.1 K/UL
EOSINOPHIL NFR BLD: 2.3 %
ERYTHROCYTE [DISTWIDTH] IN BLOOD BY AUTOMATED COUNT: 12.1 %
EST. GFR  (AFRICAN AMERICAN): >60 ML/MIN/1.73 M^2
EST. GFR  (NON AFRICAN AMERICAN): >60 ML/MIN/1.73 M^2
GLUCOSE SERPL-MCNC: 83 MG/DL
HCT VFR BLD AUTO: 40.9 %
HDLC SERPL-MCNC: 51 MG/DL
HDLC SERPL: 18.8 %
HGB BLD-MCNC: 13.9 G/DL
LDLC SERPL CALC-MCNC: 153.6 MG/DL
LYMPHOCYTES # BLD AUTO: 1 K/UL
LYMPHOCYTES NFR BLD: 27.2 %
MCH RBC QN AUTO: 32.3 PG
MCHC RBC AUTO-ENTMCNC: 34 G/DL
MCV RBC AUTO: 95 FL
MONOCYTES # BLD AUTO: 0.5 K/UL
MONOCYTES NFR BLD: 12.8 %
NEUTROPHILS # BLD AUTO: 2.2 K/UL
NEUTROPHILS NFR BLD: 57.4 %
NONHDLC SERPL-MCNC: 220 MG/DL
PLATELET # BLD AUTO: 174 K/UL
PMV BLD AUTO: 10.8 FL
POTASSIUM SERPL-SCNC: 4.1 MMOL/L
PROT SERPL-MCNC: 7.3 G/DL
RBC # BLD AUTO: 4.3 M/UL
SODIUM SERPL-SCNC: 143 MMOL/L
TRIGL SERPL-MCNC: 332 MG/DL
WBC # BLD AUTO: 3.83 K/UL

## 2017-09-29 PROCEDURE — 99214 OFFICE O/P EST MOD 30 MIN: CPT | Mod: S$GLB,,, | Performed by: INTERNAL MEDICINE

## 2017-09-29 PROCEDURE — 3008F BODY MASS INDEX DOCD: CPT | Mod: S$GLB,,, | Performed by: INTERNAL MEDICINE

## 2017-09-29 PROCEDURE — 80061 LIPID PANEL: CPT

## 2017-09-29 PROCEDURE — 36415 COLL VENOUS BLD VENIPUNCTURE: CPT

## 2017-09-29 PROCEDURE — 80053 COMPREHEN METABOLIC PANEL: CPT

## 2017-09-29 PROCEDURE — 85025 COMPLETE CBC W/AUTO DIFF WBC: CPT

## 2017-09-29 PROCEDURE — 99999 PR PBB SHADOW E&M-EST. PATIENT-LVL III: CPT | Mod: PBBFAC,,, | Performed by: INTERNAL MEDICINE

## 2017-09-29 RX ORDER — VALACYCLOVIR HYDROCHLORIDE 1 G/1
1000 TABLET, FILM COATED ORAL 3 TIMES DAILY
Refills: 3 | COMMUNITY
Start: 2017-09-22 | End: 2018-04-05

## 2017-09-29 NOTE — PROGRESS NOTES
Subjective:       Patient ID: Rachel Leal is a 52 y.o. female.    Chief Complaint: Hypertension      HPI:  Patient is known to me and presents with elevated BP. She went to her neruology and BP was very elevated. Has been checking at home and raning 120-low 140s systolic. Mild headache. No chest pains. No LE edema. She has never been on BP meds    She also stopped her lipitor since last visit as her hair was falling out. Resolved when stopped lipitor. However the statin made a huge difference in her LDL (191-93). She reports dieting and exercising since then.     Past Medical History:   Diagnosis Date    Cancer     breast stage iv    Hereditary hemochromatosis 12/16/2015    History of TMJ syndrome     Hyperlipidemia     Hypoglycemia        Family History   Problem Relation Age of Onset    Colon cancer Mother     Diabetes Father     Heart disease Father     Hemochromatosis Brother     Breast cancer Neg Hx     Ovarian cancer Neg Hx        Social History     Social History    Marital status:      Spouse name: N/A    Number of children: N/A    Years of education: N/A     Occupational History    Not on file.     Social History Main Topics    Smoking status: Former Smoker     Packs/day: 0.25     Types: Cigarettes     Quit date: 9/12/2013    Smokeless tobacco: Never Used    Alcohol use 6.0 oz/week     12 Standard drinks or equivalent per week      Comment: socially on weekends    Drug use: No    Sexual activity: Yes     Partners: Male     Birth control/ protection: Surgical      Comment: .     Other Topics Concern    Not on file     Social History Narrative    No narrative on file       Review of Systems   Constitutional: Negative for activity change, fatigue, fever and unexpected weight change.   HENT: Negative for congestion, ear pain, hearing loss, rhinorrhea, sore throat and tinnitus.    Eyes: Negative for pain, redness and visual disturbance.   Respiratory: Negative for  cough, shortness of breath and wheezing.    Cardiovascular: Negative for chest pain, palpitations and leg swelling.   Gastrointestinal: Negative for abdominal pain, blood in stool, constipation, diarrhea, nausea and vomiting.   Genitourinary: Negative for dysuria, frequency, pelvic pain and urgency.   Musculoskeletal: Negative for back pain, joint swelling and neck pain.   Skin: Negative for color change, rash and wound.   Neurological: Positive for headaches. Negative for dizziness, tremors, weakness and light-headedness.         Objective:      Physical Exam   Constitutional: She is oriented to person, place, and time. She appears well-developed and well-nourished. No distress.   HENT:   Head: Normocephalic and atraumatic.   Right Ear: External ear normal.   Left Ear: External ear normal.   Eyes: Conjunctivae and EOM are normal. Pupils are equal, round, and reactive to light. Right eye exhibits no discharge. Left eye exhibits no discharge.   Neck: Neck supple. No tracheal deviation present.   Cardiovascular: Normal rate and regular rhythm.  Exam reveals no gallop and no friction rub.    No murmur heard.  Pulmonary/Chest: Effort normal and breath sounds normal. No respiratory distress. She has no wheezes. She has no rales.   Abdominal: Soft. Bowel sounds are normal. She exhibits no distension. There is no tenderness.   Neurological: She is alert and oriented to person, place, and time. No cranial nerve deficit.   Skin: Skin is warm and dry.   Psychiatric: She has a normal mood and affect. Her behavior is normal.   Vitals reviewed.      Assessment:       1. Hyperlipidemia, unspecified hyperlipidemia type    2. Blood pressure elevated without history of HTN        Plan:       Rachel was seen today for hypertension.    Diagnoses and all orders for this visit:    Hyperlipidemia, unspecified hyperlipidemia type  -     CBC auto differential; Future  -     Comprehensive metabolic panel; Future  -     Lipid panel;  Future  Chronic, uncontrolled  Off statin  Will recheck labs as was so uncontrolled off statin previously  Had hair loss with lipitor    Blood pressure elevated without history of HTN  -     CBC auto differential; Future  -     Comprehensive metabolic panel; Future  -     Lipid panel; Future  New problem  Cont to check home BP and bring log to next visit  Manual BP normal today x2  Hold off on meds  Low Na diet  Exercise,w eigh tloss  If still borderline next visit might start low dose lisinopril  Will do renal fxn labs as well    RTC 2 weeks for BP follow up with labs and PRN

## 2017-10-04 RX ORDER — GABAPENTIN 100 MG/1
100 CAPSULE ORAL NIGHTLY
Qty: 30 CAPSULE | Refills: 11 | Status: SHIPPED | OUTPATIENT
Start: 2017-10-04 | End: 2018-01-23

## 2017-10-10 ENCOUNTER — TELEPHONE (OUTPATIENT)
Dept: INTERNAL MEDICINE | Facility: CLINIC | Age: 52
End: 2017-10-10

## 2017-10-10 NOTE — TELEPHONE ENCOUNTER
Patient cancelled her follow-up appt scheduled for 10/13/17. She states that her B/P has been stable; no elevations since seen in clinic on 9/29/17. She is requesting that you review her labs and advise of any recommendations. Thanks.

## 2017-10-10 NOTE — TELEPHONE ENCOUNTER
----- Message from Baylee Reyes sent at 10/10/2017  2:54 PM CDT -----  Contact: SELF  Rachel Leal  MRN: [id number]  : 1965  PCP: Cata Leal  Home Phone      [phone]  Work Phone      Not on file.  [email or web address]          [phone]      MESSAGE: HAVE A QUESTION ABOUT LABS-----[phone]

## 2017-10-11 NOTE — TELEPHONE ENCOUNTER
OK. She should continue to monitor her BP and if rising again needs to see me.  Her cholesterol is elevated again but not as severe as 9 months ago. LDL is 153; was 193 before we started the medicine which brought it down to 91. So it is up but not as severe. Her triglycerides are also elevated at 332 which is the highest they have been in a while.    For now, I will keep her off the statin. She needs to work on diet and exercise. Weight loss is key. She can also start taking OTC fish oil for her triglycerides. We'll check her labs again next visit. Thanks

## 2017-10-31 ENCOUNTER — OFFICE VISIT (OUTPATIENT)
Dept: NEUROLOGY | Facility: CLINIC | Age: 52
End: 2017-10-31
Payer: COMMERCIAL

## 2017-10-31 VITALS
HEIGHT: 62 IN | BODY MASS INDEX: 33.84 KG/M2 | SYSTOLIC BLOOD PRESSURE: 146 MMHG | DIASTOLIC BLOOD PRESSURE: 96 MMHG | WEIGHT: 183.88 LBS | HEART RATE: 81 BPM

## 2017-10-31 DIAGNOSIS — G20.A1 PARKINSON DISEASE: Primary | ICD-10-CM

## 2017-10-31 PROCEDURE — 99244 OFF/OP CNSLTJ NEW/EST MOD 40: CPT | Mod: S$GLB,,, | Performed by: PSYCHIATRY & NEUROLOGY

## 2017-10-31 PROCEDURE — 99999 PR PBB SHADOW E&M-EST. PATIENT-LVL III: CPT | Mod: PBBFAC,,, | Performed by: PSYCHIATRY & NEUROLOGY

## 2017-10-31 NOTE — PROGRESS NOTES
"Name: Rachel Leal  MRN: [id number]   CSN: [id number]      Date: 10/31/2017    Referring physician:  Julius Henry MD  [address] [place] [address]    Chief Complaint / Interval History: pd    History of Present Illness (HPI):  53 yo RH    About 18 months ago, she recognized slowness and stiffness.  At night, she shakes with posture and use (example of eating).  Her whole body shakes when she tries to roll over in bed or reaching for something.  R arm has started "retracting" not sueful and more issus while walking.  Her right foot turns inward.  More R arm spasms at night.  Difficulty with dancing and handling her own hygiene.  "No pep" with her activity levels.      Trouble sleeping at night - but also with excessive daytime sleepiness.    Dr. Henry diagnosed PD, started her on Mirapex.  Some benefits, and BP raised.  Then tried Requip, no benefit.  Neupro caused a rash.  Then to CD/LD up to 1 BID - thinks it makes her shake more BID so she takes QD.    Maternal grandmother with MSA, mother with PD, uncle with something parkinsonian with mental retardation.    Brother with liver disease    Nonmotor/Premotor ROS:  Hyposmia (HENT)?Yes  RBD/sleep issues (Constitutional)?Yes  Depression/anxiety (Psychiatric)?Yes  Fatigue (Constitutional)?Yes  Constipation (GI)?No  Urinary issues ()?Yes  Sexual dysfunction ()?No  Orthostasis (Cardiovascular)?Yes  Leg swelling (Cardiovascular)? No  Falls (Musculoskeletal)?No  Cognitive impairment (Neurologic)?No  Psychoses (Psychiatric)?No  Pain/Paresthesia (Neurologic)?No  Visual changes (Eyes)?No  Moles / skin changes (Skin)?No  Stridor / SOB (Pulm)?No  Bruising (Heme)?No    Past Medical History: The patient  has a past medical history of Cancer; Hereditary hemochromatosis (12/16/2015); History of TMJ syndrome; Hyperlipidemia; and Hypoglycemia.    Social History: The patient  reports that she quit smoking about 4 years ago. Her smoking use included Cigarettes. She " "smoked 0.25 packs per day. She has never used smokeless tobacco. She reports that she drinks about 6.0 oz of alcohol per week . She reports that she does not use drugs.    Family History: Their family history includes Colon cancer in her mother; Diabetes in her father; Heart disease in her father; Hemochromatosis in her brother.    Allergies: Neupro [rotigotine]     Meds:   Current Outpatient Prescriptions on File Prior to Visit   Medication Sig Dispense Refill    alprazolam (XANAX) 0.5 MG tablet Take 1 tablet (0.5 mg total) by mouth nightly as needed. 30 tablet 0    anastrozole (ARIMIDEX) 1 mg Tab Take 1 mg by mouth once daily.      carbidopa-levodopa  mg (SINEMET)  mg per tablet Take 1 tablet by mouth 2 (two) times daily. 180 tablet 4    denosumab (XGEVA) 120 mg/1.7 mL (70 mg/mL) Soln Inject 120 mg into the skin. Every 3 months      OMEGA-3S/DHA/EPA/FISH OIL (OMEGA 3 ORAL) Take 2 tablets by mouth once daily.      venlafaxine (EFFEXOR) 75 MG tablet Take 75 mg by mouth once daily.  3    vitamin D 1000 units Tab Take 2,000 mg by mouth once daily.      gabapentin (NEURONTIN) 100 MG capsule Take 1 capsule (100 mg total) by mouth every evening. 30 capsule 11    valacyclovir (VALTREX) 1000 MG tablet Take 1,000 mg by mouth 3 (three) times daily.  3     No current facility-administered medications on file prior to visit.        Exam:  BP (!) 146/96   Pulse 81   Ht 5' 2" (1.575 m)   Wt 83.4 kg (183 lb 13.8 oz)   LMP 01/17/2012   BMI 33.63 kg/m²     Constitutional  Well-developed, well-nourished, appears stated age   Ophthalmoscopic  No papilledema with no hemorrhages or exudates bilaterally   Cardiovascular  Radial pulses 2+ and symmetric, no LE edema bilaterally   Neurological    * Mental status  MOCA =      - Orientation  Oriented to person, place, time, and situation     - Memory   Intact recent and remote     - Attention/concentration  Attentive, vigilant during exam     - Language  Naming & " repetition intact, +2-step commands     - Fund of knowledge  Aware of current events     - Executive  Well-organized thoughts     - Other     * Cranial nerves       - CN II  PERRL, visual fields full to confrontation     - CN III, IV, VI  Extraocular movements full, normal pursuits and saccades     - CN V  Sensation V1 - V3 intact     - CN VII  Face strong and symmetric bilaterally     - CN VIII  Hearing intact bilaterally     - CN IX, X  Palate raises midline and symmetric     - CN XI  SCM and trapezius 5/5 bilaterally     - CN XII  Tongue midline   * Motor  Muscle bulk normal, strength 5/5 throughout   * Sensory   Intact to temperature and vibration throughout   * Coordination  No dysmetria with finger-to-nose or heel-to-shin   * Gait  See below.   * Deep tendon reflexes  2+ and symmetric throughout   Babinski downgoing bilaterally   * Specialized movement exam  No hypophonic speech.    No facial masking.   No cogwheel rigidity.     No bradykinesia.   No tremor with rest, posture, kinesis, or intention.    No other dystonia, chorea, athetosis, myoclonus, or tics.   No motor impersistence.   Normal-based gait.   No shortened stride length.   No abnormal arm swing.     No postural instability.      Laboratory/Radiological:  - Results:  Lab Visit on 09/29/2017   Component Date Value Ref Range Status    WBC 09/29/2017 3.83* 3.90 - 12.70 K/uL Final    RBC 09/29/2017 4.30  4.00 - 5.40 M/uL Final    Hemoglobin 09/29/2017 13.9  12.0 - 16.0 g/dL Final    Hematocrit 09/29/2017 40.9  37.0 - 48.5 % Final    MCV 09/29/2017 95  82 - 98 fL Final    MCH 09/29/2017 32.3* 27.0 - 31.0 pg Final    MCHC 09/29/2017 34.0  32.0 - 36.0 g/dL Final    RDW 09/29/2017 12.1  11.5 - 14.5 % Final    Platelets 09/29/2017 174  150 - 350 K/uL Final    MPV 09/29/2017 10.8  9.2 - 12.9 fL Final    Gran # 09/29/2017 2.2  1.8 - 7.7 K/uL Final    Lymph # 09/29/2017 1.0  1.0 - 4.8 K/uL Final    Mono # 09/29/2017 0.5  0.3 - 1.0 K/uL Final     Eos # 09/29/2017 0.1  0.0 - 0.5 K/uL Final    Baso # 09/29/2017 0.01  0.00 - 0.20 K/uL Final    Gran% 09/29/2017 57.4  38.0 - 73.0 % Final    Lymph% 09/29/2017 27.2  18.0 - 48.0 % Final    Mono% 09/29/2017 12.8  4.0 - 15.0 % Final    Eosinophil% 09/29/2017 2.3  0.0 - 8.0 % Final    Basophil% 09/29/2017 0.3  0.0 - 1.9 % Final    Differential Method 09/29/2017 Automated   Final    Sodium 09/29/2017 143  136 - 145 mmol/L Final    Potassium 09/29/2017 4.1  3.5 - 5.1 mmol/L Final    Chloride 09/29/2017 109  95 - 110 mmol/L Final    CO2 09/29/2017 26  23 - 29 mmol/L Final    Glucose 09/29/2017 83  70 - 110 mg/dL Final    BUN, Bld 09/29/2017 13  6 - 20 mg/dL Final    Creatinine 09/29/2017 0.7  0.5 - 1.4 mg/dL Final    Calcium 09/29/2017 9.2  8.7 - 10.5 mg/dL Final    Total Protein 09/29/2017 7.3  6.0 - 8.4 g/dL Final    Albumin 09/29/2017 3.6  3.5 - 5.2 g/dL Final    Total Bilirubin 09/29/2017 0.3  0.1 - 1.0 mg/dL Final    Alkaline Phosphatase 09/29/2017 63  55 - 135 U/L Final    AST 09/29/2017 31  10 - 40 U/L Final    ALT 09/29/2017 43  10 - 44 U/L Final    Anion Gap 09/29/2017 8  8 - 16 mmol/L Final    eGFR if African American 09/29/2017 >60  >60 mL/min/1.73 m^2 Final    eGFR if non African American 09/29/2017 >60  >60 mL/min/1.73 m^2 Final    Cholesterol 09/29/2017 271* 120 - 199 mg/dL Final    Triglycerides 09/29/2017 332* 30 - 150 mg/dL Final    HDL 09/29/2017 51  40 - 75 mg/dL Final    LDL Cholesterol 09/29/2017 153.6  63.0 - 159.0 mg/dL Final    HDL/Chol Ratio 09/29/2017 18.8* 20.0 - 50.0 % Final    Total Cholesterol/HDL Ratio 09/29/2017 5.3* 2.0 - 5.0 Final    Non-HDL Cholesterol 09/29/2017 220  mg/dL Final       - Independent review of images:    Diagnoses:          PD, 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      HHE in family, she ahs both genes, some reports of connection - I need to further investigate.    Medical Decision Making:  Long discussion rational polypharmacy.    Sukhdeep Hinson MD,  MPH  Division of Movement and Memory Disorders  [facility]  [phone]

## 2017-10-31 NOTE — LETTER
November 7, 2017      Julius Henry MD  [address] [address]           [facility]  [address]  [place] [address]  Phone: [phone]  Fax: [phone]          Patient: Rachel Leal   MR Number: [id number]   YOB: 1965   Date of Visit: 10/31/2017       Dear Dr. Julius Henry:    Thank you for referring Rachel Leal to me for evaluation. Attached you will find relevant portions of my assessment and plan of care.    If you have questions, please do not hesitate to call me. I look forward to following Rachel Leal along with you.    Sincerely,    Sukhdeep Hinson MD    Enclosure  CC:  No Recipients    If you would like to receive this communication electronically, please contact [email or web address] or [phone] to request more information on [facility] access.    For providers and/or their staff who would like to refer a patient to [facility], please contact us through our one-stop-shop provider referral line, [facility], at [phone].    If you feel you have received this communication in error or would no longer like to receive these types of communications, please e-mail [email or web address]

## 2017-11-08 DIAGNOSIS — E83.119 HEMOCHROMATOSIS, UNSPECIFIED HEMOCHROMATOSIS TYPE: Primary | ICD-10-CM

## 2017-11-29 ENCOUNTER — PROCEDURE VISIT (OUTPATIENT)
Dept: HEPATOLOGY | Facility: CLINIC | Age: 52
End: 2017-11-29
Attending: NURSE PRACTITIONER
Payer: COMMERCIAL

## 2017-11-29 ENCOUNTER — HOSPITAL ENCOUNTER (OUTPATIENT)
Dept: RADIOLOGY | Facility: HOSPITAL | Age: 52
Discharge: HOME | End: 2017-11-29
Attending: NURSE PRACTITIONER
Payer: COMMERCIAL

## 2017-11-29 ENCOUNTER — OFFICE VISIT (OUTPATIENT)
Dept: HEPATOLOGY | Facility: CLINIC | Age: 52
End: 2017-11-29
Payer: COMMERCIAL

## 2017-11-29 VITALS
BODY MASS INDEX: 34.03 KG/M2 | DIASTOLIC BLOOD PRESSURE: 85 MMHG | OXYGEN SATURATION: 97 % | TEMPERATURE: 97 F | HEART RATE: 81 BPM | SYSTOLIC BLOOD PRESSURE: 140 MMHG | HEIGHT: 62 IN | WEIGHT: 184.94 LBS

## 2017-11-29 DIAGNOSIS — E83.119 HEMOCHROMATOSIS, UNSPECIFIED HEMOCHROMATOSIS TYPE: ICD-10-CM

## 2017-11-29 DIAGNOSIS — E83.110 HEREDITARY HEMOCHROMATOSIS: Primary | ICD-10-CM

## 2017-11-29 PROCEDURE — 76700 US EXAM ABDOM COMPLETE: CPT | Mod: 26,,, | Performed by: RADIOLOGY

## 2017-11-29 PROCEDURE — 99999 PR PBB SHADOW E&M-EST. PATIENT-LVL IV: CPT | Mod: PBBFAC,,, | Performed by: NURSE PRACTITIONER

## 2017-11-29 PROCEDURE — 76700 US EXAM ABDOM COMPLETE: CPT | Mod: TC

## 2017-11-29 PROCEDURE — 91200 LIVER ELASTOGRAPHY: CPT | Mod: S$GLB,,, | Performed by: NURSE PRACTITIONER

## 2017-11-29 PROCEDURE — 99214 OFFICE O/P EST MOD 30 MIN: CPT | Mod: S$GLB,,, | Performed by: NURSE PRACTITIONER

## 2017-11-29 RX ORDER — VENLAFAXINE HYDROCHLORIDE 75 MG/1
75 CAPSULE, EXTENDED RELEASE ORAL DAILY
COMMUNITY
Start: 2017-11-08

## 2017-11-29 NOTE — PROCEDURES
Procedures Fibroscan Procedure     Name: Rachel Leal  Date of Procedure : 2017   :: Bernadette Santos NP  Diagnosis: hemochromatosis    Probe: M    Fibroscan readin.6 KPa    Fibrosis:F 0-1     CAP readin dB/m    Steatosis: :S2

## 2017-11-29 NOTE — PROGRESS NOTES
[facility] Established Patient Visit    Reason for Visit:  F/u hemochromatosis    PCP: Cata Leal    HPI:  This is a 52 y.o. female here for f/u of evaluation for hemochromatosis. She was seen once in 2015 and never returned for f/u. Her brother is a pt of mine, homozygote for D7975C. Her workup showed she is also a homozygote for D7824Y. Her ferritin was 396 with elevated iron and iron sat in 2015. Ferritin in 2017 = 579 and today was 527. She has normal liver enzymes and liver functioning. She had u/s and Fibroscan today. U/s showed increased echogenicity in liver, can be seen with fatty liver or hemochromatosis. Fibroscan = F0-F1, no fibrosis. CAP = 279, moderate steatosis. She had normal spleen on u/s. plts nl at 174. She is not immune to hep A or B per labs. She denies any symptoms of advanced chronic liver disease including jaundice, dark urine, hematemesis, melena, slowed mentation, abdominal distention.        ROS:   GENERAL: Denies fever, chills, weight loss/gain, fatigue  HEENT: Denies headaches, dizziness, vision/hearing changes  CARDIOVASCULAR: Denies chest pain, palpitations, or edema  RESPIRATORY: Denies dyspnea, cough  GI: Denies abdominal pain, rectal bleeding, nausea, vomiting. No change in bowel pattern or color  : Denies dysuria, hematuria   SKIN: Denies rash, itching   NEURO: Denies confusion, memory loss, or mood changes  PSYCH: Denies depression or anxiety  HEME/LYMPH: Denies easy bruising or bleeding      PMHX:  has a past medical history of Cancer; Hereditary hemochromatosis (2015); History of TMJ syndrome; Hyperlipidemia; and Hypoglycemia.    PSHX:  has a past surgical history that includes Breast surgery; Eye surgery;  section; Hand surgery; Hysterectomy; and Colonoscopy.    The patient's social and family histories were reviewed by me and updated in the appropriate section of the electronic medical record.    Review of patient's allergies  "indicates:   Allergen Reactions    Neupro [rotigotine] Dermatitis       Current Outpatient Prescriptions on File Prior to Visit   Medication Sig Dispense Refill    alprazolam (XANAX) 0.5 MG tablet Take 1 tablet (0.5 mg total) by mouth nightly as needed. 30 tablet 0    anastrozole (ARIMIDEX) 1 mg Tab Take 1 mg by mouth once daily.      carbidopa-levodopa  mg (SINEMET)  mg per tablet Take 1 tablet by mouth 2 (two) times daily. 180 tablet 4    denosumab (XGEVA) 120 mg/1.7 mL (70 mg/mL) Soln Inject 120 mg into the skin. Every 3 months      OMEGA-3S/DHA/EPA/FISH OIL (OMEGA 3 ORAL) Take 2 tablets by mouth once daily.      vitamin D 1000 units Tab Take 2,000 mg by mouth once daily.      [DISCONTINUED] venlafaxine (EFFEXOR) 75 MG tablet Take 75 mg by mouth once daily.  3    gabapentin (NEURONTIN) 100 MG capsule Take 1 capsule (100 mg total) by mouth every evening. 30 capsule 11    valacyclovir (VALTREX) 1000 MG tablet Take 1,000 mg by mouth 3 (three) times daily.  3     No current facility-administered medications on file prior to visit.          Objective Findings:    PHYSICAL EXAM:   Friendly White female, in no acute distress; alert and oriented to person, place and time  VITALS: BP (!) 140/85 (BP Location: Left arm, Patient Position: Sitting, BP Method: Large (Automatic))   Pulse 81   Temp 96.7 °F (35.9 °C) (Oral)   Ht 5' 2" (1.575 m)   Wt 83.9 kg (184 lb 15.5 oz)   LMP 01/17/2012   SpO2 97%   BMI 33.83 kg/m²   HENT: Normocephalic, without obvious abnormality. Oral mucosa pink and moist. Dentition good.  EYES: Sclerae anicteric.    NECK: Supple.  CARDIOVASCULAR: Regular rate and rhythm. No murmurs.  RESPIRATORY: Normal respiratory effort. BBS CTA. No wheezes or crackles.  GI: Soft, non-tender, non-distended. No hepatosplenomegaly. No masses palpable. No ascites.  EXTREMITIES:  No clubbing, cyanosis or edema.  SKIN: Warm and dry. No jaundice. No rashes noted to exposed skin. No telangectasias " noted. No palmar erythema.  NEURO:  Normal gate. No asterixis.  PSYCH:  Memory intact. Thought and speech pattern appropriate. Behavior normal. No depression or anxiety noted.      Labs:  Lab Results   Component Value Date    WBC 4.24 11/29/2017    HGB 13.9 11/29/2017    HCT 40.6 11/29/2017     11/29/2017    CHOL 271 (H) 09/29/2017    TRIG 332 (H) 09/29/2017    HDL 51 09/29/2017     11/29/2017    K 4.2 11/29/2017    CREATININE 0.7 11/29/2017    ALT 31 11/29/2017    AST 24 11/29/2017    ALKPHOS 70 11/29/2017    BILITOT 0.5 11/29/2017    ALBUMIN 3.6 11/29/2017    INR 0.9 11/29/2017     ABD U/S 11/29/17  Findings:     The liver is normal in size, measuring 15.6 cm.  Hepatic parenchyma is largely homogeneous.  The hepatic parenchyma demonstrates increased attenuation when compared to the adjacent right renal cortex with HRI measuring 2.21.  A focal hypoechoic lesion adjacent to the gallbladder measuring 1.9 x 1.4 x 1.2 cm, likely correlates with focal fatty sparing.  No intra- or extrahepatic biliary ductal dilatation. The common bile duct measures 3 mm.      The gallbladder is largely unremarkable, noting 2 tiny echogenic foci consistent with tiny stones. No evidence of wall thickening or pericholecystic fluid.  Sonographic Harris's sign is negative.     The visualized portions of the pancreas, aorta and IVC appear normal.     The kidneys are normal in size and measure 12.3 cm on the right and 12.4 cm on the left.     The spleen is normal in size and measures 9.4 x 3.2 cm.     No ascites.   Impression         Diffuse increase in hepatic parenchymal attenuation, which is commonly associated with hepatic steatosis but may represent other diffuse parenchymal process such as hemochromatosis.    Tiny gallstones.         ASSESSMENT:  52 y.o. White female with:  1.  Hereditary hemochromatosis  -- normal liver enzymes  -- homozygote for C282Y  -- ferritin 527  -- Fibroscan = F0-F1, no fibrosis      EDUCATION:    Discussed that we should do a liver biopsy to quantify amount of iron in liver to determine if she needs phlebotomy if there is iron in liver. This will also help to determine if she has any degree of NAFLD and fibrosis. Discussed liver biopsy procedure and possible complications associated with liver biopsy including pain, bleeding, infection, and organ perforation. Reviewed the role of the procedure including confirming of diagnosis and staging of liver disease so pt can be appropriately followed from this point forward.         PLAN:  1. Twinrix vaccines to be coordinated  2. U/s guided liver biopsy  3. Phlebotomy to be coordinated pending biopsy results  4. F/u in 3 months    Thank you for allowing me to participate in the care of Rachel Johnathan Santos, MARIVELC

## 2017-11-29 NOTE — PATIENT INSTRUCTIONS
1. Liver biopsy to assess the amount of iron in your liver and scarring  2. Will arrange hepatitis A and B vaccines based on your labs  3. Will start phlebotomy if liver biopsy shows iron in liver  4. Follow up in 3 months      Discharge Instructions for Hereditary Hemochromatosis  You have been diagnosed with hereditary hemochromatosis (HH). This is an inherited disease that causes you to absorb too much iron. Iron is needed for making red blood cells. But too much of it can cause serious health problems. Here's what you need to know.  Home care  · Tell your children and your brothers and sisters that you have hemochromatosis. The disease is inherited. So other family members may have it and not know it. Your first-degree family members should talk to their healthcare providers about the need for blood testing.  · Have your iron levels checked regularly.  · Avoid drinking alcohol. If you have trouble quitting, ask your healthcare provider about programs to help you.  · Avoid eating large amounts of iron-rich foods. These include red meats (especially liver) and food products with iron added.  · Dont eat raw fish or raw shellfish.  · Never take iron supplements. Even small amounts of iron in some multivitamins can be harmful.  · Dont take pills with more than 500 mg of vitamin C each day. Its OK to eat foods that have vitamin C.  Follow-up  · Make a follow-up appointment.  · Keep your follow-up appointments. You may need to have a pint of blood removed (phlebotomy) on a regular basis to keep your iron levels normal.  When to call your healthcare provider  Call your healthcare provider right away if you have any of the following:  · Tiredness  · Irregular pulse or heartbeat  · Any chest pain  · Loss of appetite, nausea, or vomiting  · Trouble breathing or exercising  · Increased thirst or increased need to urinate  · Fever of 100.4°F (38°C) or higher, or as directed by your healthcare provider  · Muscle aches,  joint pains, or pain in your belly  · Darkened skin for no apparent reason  · Yellowing of the skin or whites of the eyes (jaundice)   Date Last Reviewed: 2/1/2017 © 2000-2017 [employer]. [address] [place] [address], [place] [address]. All rights reserved. This information is not intended as a substitute for professional medical care. Always follow your healthcare professional's instructions.

## 2017-11-30 ENCOUNTER — TELEPHONE (OUTPATIENT)
Dept: HEPATOLOGY | Facility: CLINIC | Age: 52
End: 2017-11-30

## 2017-11-30 DIAGNOSIS — E83.110 HEREDITARY HEMOCHROMATOSIS: Primary | ICD-10-CM

## 2017-11-30 NOTE — TELEPHONE ENCOUNTER
----- Message from Dayday Butler, PharmD sent at 11/30/2017  1:40 PM CST -----  Regarding: twinrix  Good afternoon! Mrs. Leal's immunization is covered in the pharmacy at no charge. Spoke to the patient and she is going to get it from her local pharmacy.

## 2017-11-30 NOTE — TELEPHONE ENCOUNTER
Patient called on home/mobile number. No Answer. Left message that I was calling from the office of Bernadette RAMAN to discuss the Liver Biopsy. Please call us back at [phone] you may ask for Camille. Pt on Fish Oil and Vitamin E.

## 2017-12-01 ENCOUNTER — TELEPHONE (OUTPATIENT)
Dept: HEPATOLOGY | Facility: CLINIC | Age: 52
End: 2017-12-01

## 2017-12-01 DIAGNOSIS — E83.110 HEREDITARY HEMOCHROMATOSIS: Primary | ICD-10-CM

## 2017-12-01 NOTE — TELEPHONE ENCOUNTER
Received call from Radiology with approval for the tentative date requested by the patient for Thurs 12/21/17 with check in time for 7 am and procedure at 8 am.    Patient called at home. Date and time is acceptable. Pre procedure teaching reinforced. Follow-up Office Visit with Bernadette RAMAN scheduled for 1/3/2018 at 9:20 am. Verbalized understanding and agreed. Appt letters placed in the mail.   DOSC completed.

## 2017-12-20 ENCOUNTER — TELEPHONE (OUTPATIENT)
Dept: INTERVENTIONAL RADIOLOGY/VASCULAR | Facility: HOSPITAL | Age: 52
End: 2017-12-20

## 2017-12-20 NOTE — TELEPHONE ENCOUNTER
Phone call (complete )   Arrival time-  7      NPO reinforced  Allergies reviewed  Directions given  Instructed to take meds in AM  Has Ride ( yes)             Labs (current  )      Approx recovery length discussed            Anti coags (held )

## 2017-12-21 ENCOUNTER — HOSPITAL ENCOUNTER (OUTPATIENT)
Facility: HOSPITAL | Age: 52
Discharge: HOME | End: 2017-12-21
Attending: INTERNAL MEDICINE | Admitting: RADIOLOGY
Payer: COMMERCIAL

## 2017-12-21 VITALS
RESPIRATION RATE: 12 BRPM | TEMPERATURE: 98 F | HEART RATE: 67 BPM | WEIGHT: 184 LBS | HEIGHT: 62 IN | SYSTOLIC BLOOD PRESSURE: 107 MMHG | OXYGEN SATURATION: 97 % | BODY MASS INDEX: 33.86 KG/M2 | DIASTOLIC BLOOD PRESSURE: 73 MMHG

## 2017-12-21 DIAGNOSIS — E83.110 HEREDITARY HEMOCHROMATOSIS: ICD-10-CM

## 2017-12-21 DIAGNOSIS — E83.119 HEMOCHROMATOSIS: ICD-10-CM

## 2017-12-21 PROCEDURE — 25000003 PHARM REV CODE 250: Performed by: INTERNAL MEDICINE

## 2017-12-21 PROCEDURE — 63600175 PHARM REV CODE 636 W HCPCS: Performed by: RADIOLOGY

## 2017-12-21 PROCEDURE — 25000003 PHARM REV CODE 250: Performed by: RADIOLOGY

## 2017-12-21 PROCEDURE — 88313 SPECIAL STAINS GROUP 2: CPT | Mod: 26,,, | Performed by: PATHOLOGY

## 2017-12-21 PROCEDURE — 88307 TISSUE EXAM BY PATHOLOGIST: CPT | Performed by: PATHOLOGY

## 2017-12-21 PROCEDURE — 88307 TISSUE EXAM BY PATHOLOGIST: CPT | Mod: 26,,, | Performed by: PATHOLOGY

## 2017-12-21 RX ORDER — LIDOCAINE HYDROCHLORIDE 10 MG/ML
5 INJECTION, SOLUTION EPIDURAL; INFILTRATION; INTRACAUDAL; PERINEURAL ONCE
Status: COMPLETED | OUTPATIENT
Start: 2017-12-21 | End: 2017-12-21

## 2017-12-21 RX ORDER — MIDAZOLAM HYDROCHLORIDE 1 MG/ML
INJECTION INTRAMUSCULAR; INTRAVENOUS CODE/TRAUMA/SEDATION MEDICATION
Status: COMPLETED | OUTPATIENT
Start: 2017-12-21 | End: 2017-12-21

## 2017-12-21 RX ORDER — FENTANYL CITRATE 50 UG/ML
100 INJECTION, SOLUTION INTRAMUSCULAR; INTRAVENOUS ONCE
Status: DISCONTINUED | OUTPATIENT
Start: 2017-12-21 | End: 2017-12-21 | Stop reason: HOSPADM

## 2017-12-21 RX ORDER — MIDAZOLAM HYDROCHLORIDE 1 MG/ML
2 INJECTION INTRAMUSCULAR; INTRAVENOUS ONCE
Status: DISCONTINUED | OUTPATIENT
Start: 2017-12-21 | End: 2017-12-21 | Stop reason: HOSPADM

## 2017-12-21 RX ORDER — FENTANYL CITRATE 50 UG/ML
INJECTION, SOLUTION INTRAMUSCULAR; INTRAVENOUS CODE/TRAUMA/SEDATION MEDICATION
Status: COMPLETED | OUTPATIENT
Start: 2017-12-21 | End: 2017-12-21

## 2017-12-21 RX ORDER — SODIUM CHLORIDE 9 MG/ML
500 INJECTION, SOLUTION INTRAVENOUS ONCE
Status: COMPLETED | OUTPATIENT
Start: 2017-12-21 | End: 2017-12-21

## 2017-12-21 RX ADMIN — MIDAZOLAM HYDROCHLORIDE 1 MG: 1 INJECTION, SOLUTION INTRAMUSCULAR; INTRAVENOUS at 08:12

## 2017-12-21 RX ADMIN — MIDAZOLAM HYDROCHLORIDE 0.5 MG: 1 INJECTION, SOLUTION INTRAMUSCULAR; INTRAVENOUS at 09:12

## 2017-12-21 RX ADMIN — SODIUM CHLORIDE 500 ML: 900 INJECTION, SOLUTION INTRAVENOUS at 07:12

## 2017-12-21 RX ADMIN — FENTANYL CITRATE 25 MCG: 50 INJECTION, SOLUTION INTRAMUSCULAR; INTRAVENOUS at 09:12

## 2017-12-21 RX ADMIN — FENTANYL CITRATE 50 MCG: 50 INJECTION, SOLUTION INTRAMUSCULAR; INTRAVENOUS at 08:12

## 2017-12-21 RX ADMIN — LIDOCAINE HYDROCHLORIDE 0.5 MG: 10 INJECTION, SOLUTION EPIDURAL; INFILTRATION; INTRACAUDAL; PERINEURAL at 07:12

## 2017-12-21 NOTE — H&P
Radiology History & Physical      SUBJECTIVE:     Chief Complaint: Hereditary hemochromatosis    History of Present Illness:  Rachel Leal is a 52 y.o. female who presents for ultrasound guided random liver biopsy.  Past Medical History:   Diagnosis Date    Cancer     breast stage iv    Hereditary hemochromatosis 2015    History of TMJ syndrome     Hyperlipidemia     Hypoglycemia     Parkinson disease      Past Surgical History:   Procedure Laterality Date    BREAST SURGERY      augmentation     SECTION      COLONOSCOPY      EYE SURGERY      HAND SURGERY      HYSTERECTOMY      TL BSO       Home Meds:   Prior to Admission medications    Medication Sig Start Date End Date Taking? Authorizing Provider   alprazolam (XANAX) 0.5 MG tablet Take 1 tablet (0.5 mg total) by mouth nightly as needed. 17  Yes Cata Leal MD   anastrozole (ARIMIDEX) 1 mg Tab Take 1 mg by mouth once daily.   Yes Historical Provider, MD   carbidopa-levodopa  mg (SINEMET)  mg per tablet Take 1 tablet by mouth 2 (two) times daily. 17  Yes Julius Henry MD   denosumab (XGEVA) 120 mg/1.7 mL (70 mg/mL) Soln Inject 120 mg into the skin. Every 3 months   Yes Historical Provider, MD   OMEGA-3S/DHA/EPA/FISH OIL (OMEGA 3 ORAL) Take 2 tablets by mouth once daily.   Yes Historical Provider, MD   venlafaxine (EFFEXOR-XR) 75 MG 24 hr capsule  17  Yes Historical Provider, MD   vitamin D 1000 units Tab Take 2,000 mg by mouth once daily.   Yes Historical Provider, MD   gabapentin (NEURONTIN) 100 MG capsule Take 1 capsule (100 mg total) by mouth every evening. 10/4/17 10/4/18  Cata Leal MD   valacyclovir (VALTREX) 1000 MG tablet Take 1,000 mg by mouth 3 (three) times daily. 17   Historical Provider, MD     Anticoagulants/Antiplatelets: no anticoagulation    Allergies:   Review of patient's allergies indicates:   Allergen Reactions    Neupro [rotigotine] Dermatitis     Sedation History:   no adverse reactions    Review of Systems:   Hematological: no known coagulopathies  Respiratory: no shortness of breath  Cardiovascular: no chest pain  Gastrointestinal: no abdominal pain  Genito-Urinary: no dysuria  Musculoskeletal: negative  Neurological: no TIA or stroke symptoms         OBJECTIVE:     Vital Signs (Most Recent)  Temp: 97.5 °F (36.4 °C) (12/21/17 0717)  Pulse: 72 (12/21/17 0717)  Resp: 18 (12/21/17 0717)  BP: 124/73 (12/21/17 0718)  SpO2: (!) 94 % (12/21/17 0717)    Physical Exam:  ASA: 2  Mallampati: II    General: no acute distress  Mental Status: alert and oriented to person, place and time  HEENT: normocephalic, atraumatic  Chest: unlabored breathing  Abdomen: nondistended  Extremity: moves all extremities    Laboratory  Lab Results   Component Value Date    INR 0.9 11/29/2017       Lab Results   Component Value Date    WBC 4.24 11/29/2017    HGB 13.9 11/29/2017    HCT 40.6 11/29/2017    MCV 94 11/29/2017     11/29/2017      Lab Results   Component Value Date    GLU 97 11/29/2017     11/29/2017    K 4.2 11/29/2017     11/29/2017    CO2 26 11/29/2017    BUN 22 (H) 11/29/2017    CREATININE 0.7 11/29/2017    CALCIUM 9.3 11/29/2017    MG 2.2 07/19/2017    ALT 31 11/29/2017    AST 24 11/29/2017    ALBUMIN 3.6 11/29/2017    BILITOT 0.5 11/29/2017       ASSESSMENT/PLAN:     Sedation Plan: versed and fentanyl  Patient will undergo ultrasound guided random liver biopsy.    Kevin Alcazar MD  Resident  Department of Radiology  Pager: [phone]

## 2017-12-21 NOTE — PROGRESS NOTES
Patient given discharge instructions and verbalized understanding of at home care. IV discontinued with catheter intact. Bandage to right abdomen clean, dry and intact. Patient refused wheelchair and ambulated to garage with spouse by side.

## 2017-12-21 NOTE — DISCHARGE SUMMARY
Radiology Discharge Summary      Hospital Course: No complications    Admit Date: 12/21/2017  Discharge Date: 12/21/2017     Instructions Given to Patient: Yes  Diet: Resume prior diet  Activity: activity as tolerated and no driving for today    Description of Condition on Discharge: Stable  Vital Signs (Most Recent): Temp: 98.4 °F (36.9 °C) (12/21/17 0930)  Pulse: 67 (12/21/17 1300)  Resp: 12 (12/21/17 1300)  BP: 107/73 (12/21/17 1300)  SpO2: 97 % (12/21/17 1300)    Discharge Disposition: Home    Discharge Diagnosis: hereditary hemochromatosis     Follow-up: follow-up results with your hepatologist.    Kevin Alcazar MD  Resident  Department of Radiology  Pager: [phone]

## 2017-12-21 NOTE — PROCEDURES
.Radiology Post-Procedure Note    Pre Op Diagnosis: hereditary hemochromatosis  Post Op Diagnosis: Same    Procedure: ultrasound guided random liver biopsy    Procedure performed by: Iva WAGNER, Sade; Kevin Alcazar MD    Written Informed Consent Obtained: Yes  Specimen Removed: YES 1 core sample  Estimated Blood Loss: Minimal    Findings:   Successful ultrasound guided random liver biopsy.    Patient tolerated procedure well.    Kevin Alcazar MD  Resident  Department of Radiology  Pager: [phone]

## 2017-12-21 NOTE — DISCHARGE INSTRUCTIONS
For scheduling: Call Jelena at [phone]    For questions or concerns call: ROBERTH MON-FRI 8 AM- 5PM [phone]. Radiology resident on call [phone].    For immediate concerns that are not emergent, you may call our radiology clinic at: [phone]

## 2017-12-21 NOTE — PROGRESS NOTES
Pt arrived to [facility], bay [facility]. Report received from BITA Ivy. Dressing to abdomen dry and intact.

## 2018-01-02 ENCOUNTER — TELEPHONE (OUTPATIENT)
Dept: HEPATOLOGY | Facility: CLINIC | Age: 53
End: 2018-01-02

## 2018-01-02 NOTE — TELEPHONE ENCOUNTER
----- Message from Phil Tracy MD sent at 12/29/2017  5:08 PM CST -----  Please review at next week Path Conference. Does not seem to have high fibrosis or significant iron overload.

## 2018-01-03 ENCOUNTER — OFFICE VISIT (OUTPATIENT)
Dept: HEPATOLOGY | Facility: CLINIC | Age: 53
End: 2018-01-03
Payer: COMMERCIAL

## 2018-01-03 VITALS
HEART RATE: 80 BPM | RESPIRATION RATE: 18 BRPM | BODY MASS INDEX: 34.85 KG/M2 | SYSTOLIC BLOOD PRESSURE: 144 MMHG | DIASTOLIC BLOOD PRESSURE: 87 MMHG | TEMPERATURE: 95 F | OXYGEN SATURATION: 97 % | WEIGHT: 189.38 LBS | HEIGHT: 62 IN

## 2018-01-03 DIAGNOSIS — E83.110 HEREDITARY HEMOCHROMATOSIS: Primary | ICD-10-CM

## 2018-01-03 DIAGNOSIS — E78.5 HYPERLIPIDEMIA, UNSPECIFIED HYPERLIPIDEMIA TYPE: ICD-10-CM

## 2018-01-03 DIAGNOSIS — Z78.9 ALCOHOL USE: ICD-10-CM

## 2018-01-03 DIAGNOSIS — E66.09 CLASS 1 OBESITY DUE TO EXCESS CALORIES WITH BODY MASS INDEX (BMI) OF 34.0 TO 34.9 IN ADULT, UNSPECIFIED WHETHER SERIOUS COMORBIDITY PRESENT: ICD-10-CM

## 2018-01-03 DIAGNOSIS — K76.0 FATTY LIVER: ICD-10-CM

## 2018-01-03 DIAGNOSIS — R79.89 ELEVATED FERRITIN: ICD-10-CM

## 2018-01-03 PROBLEM — E66.811 CLASS 1 OBESITY DUE TO EXCESS CALORIES WITH BODY MASS INDEX (BMI) OF 34.0 TO 34.9 IN ADULT: Status: ACTIVE | Noted: 2018-01-03

## 2018-01-03 PROCEDURE — 99214 OFFICE O/P EST MOD 30 MIN: CPT | Mod: S$GLB,,, | Performed by: NURSE PRACTITIONER

## 2018-01-03 PROCEDURE — 99999 PR PBB SHADOW E&M-EST. PATIENT-LVL V: CPT | Mod: PBBFAC,,, | Performed by: NURSE PRACTITIONER

## 2018-01-04 ENCOUNTER — CONFERENCE (OUTPATIENT)
Dept: TRANSPLANT | Facility: CLINIC | Age: 53
End: 2018-01-04

## 2018-01-05 NOTE — TELEPHONE ENCOUNTER
1/4/18 Liver Pathology Conference:    Liver Biopsy: mild steatosis/ mild-mod. Siderosis    Further iron studies pending

## 2018-01-08 ENCOUNTER — LAB VISIT (OUTPATIENT)
Dept: LAB | Facility: HOSPITAL | Age: 53
End: 2018-01-08
Attending: INTERNAL MEDICINE
Payer: COMMERCIAL

## 2018-01-08 DIAGNOSIS — E78.5 HYPERLIPIDEMIA, UNSPECIFIED HYPERLIPIDEMIA TYPE: ICD-10-CM

## 2018-01-08 DIAGNOSIS — E83.110 HEREDITARY HEMOCHROMATOSIS: ICD-10-CM

## 2018-01-08 DIAGNOSIS — E83.119 HEMOCHROMATOSIS, UNSPECIFIED HEMOCHROMATOSIS TYPE: ICD-10-CM

## 2018-01-08 DIAGNOSIS — F41.9 ANXIETY: ICD-10-CM

## 2018-01-08 LAB
ALBUMIN SERPL BCP-MCNC: 3.9 G/DL
ALP SERPL-CCNC: 67 U/L
ALT SERPL W/O P-5'-P-CCNC: 35 U/L
ANION GAP SERPL CALC-SCNC: 11 MMOL/L
AST SERPL-CCNC: 23 U/L
BASOPHILS # BLD AUTO: 0.01 K/UL
BASOPHILS NFR BLD: 0.2 %
BILIRUB SERPL-MCNC: 0.4 MG/DL
BUN SERPL-MCNC: 29 MG/DL
CALCIUM SERPL-MCNC: 8.8 MG/DL
CHLORIDE SERPL-SCNC: 107 MMOL/L
CHOLEST SERPL-MCNC: 296 MG/DL
CHOLEST/HDLC SERPL: 5.4 {RATIO}
CO2 SERPL-SCNC: 23 MMOL/L
CREAT SERPL-MCNC: 0.8 MG/DL
DIFFERENTIAL METHOD: ABNORMAL
EOSINOPHIL # BLD AUTO: 0.1 K/UL
EOSINOPHIL NFR BLD: 3.1 %
ERYTHROCYTE [DISTWIDTH] IN BLOOD BY AUTOMATED COUNT: 11.5 %
EST. GFR  (AFRICAN AMERICAN): >60 ML/MIN/1.73 M^2
EST. GFR  (NON AFRICAN AMERICAN): >60 ML/MIN/1.73 M^2
FERRITIN SERPL-MCNC: 494 NG/ML
GLUCOSE SERPL-MCNC: 91 MG/DL
HCT VFR BLD AUTO: 41.5 %
HDLC SERPL-MCNC: 55 MG/DL
HDLC SERPL: 18.6 %
HGB BLD-MCNC: 14.1 G/DL
LDLC SERPL CALC-MCNC: 177.4 MG/DL
LYMPHOCYTES # BLD AUTO: 1.5 K/UL
LYMPHOCYTES NFR BLD: 31.7 %
MCH RBC QN AUTO: 32 PG
MCHC RBC AUTO-ENTMCNC: 34 G/DL
MCV RBC AUTO: 94 FL
MONOCYTES # BLD AUTO: 0.5 K/UL
MONOCYTES NFR BLD: 10.7 %
NEUTROPHILS # BLD AUTO: 2.5 K/UL
NEUTROPHILS NFR BLD: 54.3 %
NONHDLC SERPL-MCNC: 241 MG/DL
PLATELET # BLD AUTO: 175 K/UL
PMV BLD AUTO: 10.9 FL
POTASSIUM SERPL-SCNC: 4.5 MMOL/L
PROT SERPL-MCNC: 7.4 G/DL
RBC # BLD AUTO: 4.41 M/UL
SODIUM SERPL-SCNC: 141 MMOL/L
TRIGL SERPL-MCNC: 318 MG/DL
WBC # BLD AUTO: 4.58 K/UL

## 2018-01-08 PROCEDURE — 82728 ASSAY OF FERRITIN: CPT

## 2018-01-08 PROCEDURE — 36415 COLL VENOUS BLD VENIPUNCTURE: CPT

## 2018-01-08 PROCEDURE — 80061 LIPID PANEL: CPT

## 2018-01-08 PROCEDURE — 85025 COMPLETE CBC W/AUTO DIFF WBC: CPT

## 2018-01-08 PROCEDURE — 80053 COMPREHEN METABOLIC PANEL: CPT

## 2018-01-09 ENCOUNTER — TELEPHONE (OUTPATIENT)
Dept: HEPATOLOGY | Facility: CLINIC | Age: 53
End: 2018-01-09

## 2018-01-09 NOTE — TELEPHONE ENCOUNTER
----- Message from Bernadette Santos NP sent at 1/9/2018  7:48 AM CST -----  Please notify pt ferritin a little lower. Will repeat as scheduled in a few weeks.

## 2018-01-23 ENCOUNTER — OFFICE VISIT (OUTPATIENT)
Dept: INTERNAL MEDICINE | Facility: CLINIC | Age: 53
End: 2018-01-23
Payer: COMMERCIAL

## 2018-01-23 VITALS
BODY MASS INDEX: 34.29 KG/M2 | DIASTOLIC BLOOD PRESSURE: 78 MMHG | RESPIRATION RATE: 18 BRPM | HEART RATE: 78 BPM | SYSTOLIC BLOOD PRESSURE: 120 MMHG | HEIGHT: 62 IN | WEIGHT: 186.31 LBS

## 2018-01-23 DIAGNOSIS — F41.9 ANXIETY: ICD-10-CM

## 2018-01-23 DIAGNOSIS — E83.119 HEMOCHROMATOSIS, UNSPECIFIED HEMOCHROMATOSIS TYPE: Primary | ICD-10-CM

## 2018-01-23 DIAGNOSIS — E78.5 HYPERLIPIDEMIA, UNSPECIFIED HYPERLIPIDEMIA TYPE: ICD-10-CM

## 2018-01-23 DIAGNOSIS — E66.09 CLASS 1 OBESITY DUE TO EXCESS CALORIES WITH BODY MASS INDEX (BMI) OF 34.0 TO 34.9 IN ADULT, UNSPECIFIED WHETHER SERIOUS COMORBIDITY PRESENT: ICD-10-CM

## 2018-01-23 DIAGNOSIS — G20.A1 PARKINSON DISEASE: ICD-10-CM

## 2018-01-23 PROCEDURE — 99999 PR PBB SHADOW E&M-EST. PATIENT-LVL III: CPT | Mod: PBBFAC,,, | Performed by: INTERNAL MEDICINE

## 2018-01-23 PROCEDURE — 99214 OFFICE O/P EST MOD 30 MIN: CPT | Mod: S$GLB,,, | Performed by: INTERNAL MEDICINE

## 2018-01-23 RX ORDER — PRAVASTATIN SODIUM 40 MG/1
40 TABLET ORAL DAILY
Qty: 90 TABLET | Refills: 3 | Status: SHIPPED | OUTPATIENT
Start: 2018-01-23 | End: 2019-01-09 | Stop reason: SDUPTHER

## 2018-01-23 RX ORDER — ALPRAZOLAM 0.5 MG/1
0.5 TABLET ORAL NIGHTLY PRN
Qty: 30 TABLET | Refills: 0 | Status: SHIPPED | OUTPATIENT
Start: 2018-01-23 | End: 2018-11-26 | Stop reason: SDUPTHER

## 2018-01-23 NOTE — PATIENT INSTRUCTIONS
Lifestyle Changes to Control Cholesterol  You can control your cholesterol through diet, exercise, weight management, quitting smoking, stress management, and taking your medicines right. These things can also lower your risk for cardiovascular disease.    Eating healthy  Your healthcare provider will give you information on diet changes you may need to make. Your provider may recommend that you see a registered dietitian for help with diet changes. Changes may include:  · Cutting back on the amount of fat and cholesterol in your meals  · Eating less salt (sodium). This is especially important if you have high blood pressure.  · Eating more fresh vegetables and fruits  · Eating lean proteins such as fish, poultry, beans, and peas  · Eating less red meat and processed meats  · Using low-fat dairy products  · Using vegetable and nut oils in limited amounts  · Limiting how many sweets and processed foods like chips, cookies, and baked goods that you eat   · Limiting how many sugar-sweetened beverages you drink  · Limiting how often you eat out  Getting exercise  Regular exercise is a good way to help your body control cholesterol. Regular exercise can help in many ways. It can:  · Raise your good cholesterol  · Help lower your bad cholesterol  · Let blood flow better through your body  · Give more oxygen to your muscles and tissues  · Help you manage your weight  · Help your heart pump better  · Lower your blood pressure  Your healthcare provider may recommend that you get more physical activity if you haven't been active. Your provider may recommend that you get moderate to vigorous physical activity for at least 40 minutes each day. You should do this for at least 3 to 4 days each week. A few examples of moderate to vigorous activity are:  · Walking at a brisk pace. This is about 3 to 4 miles per hour.  · Jogging or running  · Swimming or water aerobics  · Hiking  · Dancing  · Martial arts  · Tennis  · Riding a  bicycle or stationary bike  · Dancing  Managing your weight  If you are overweight or obese, your healthcare provider will work with you to help you lose weight and lower your BMI (body mass index). Making diet changes and getting more physical activity can help. Changing your diet will help you lose weight more easily than adding exercise.  Quitting smoking  Smoking and other tobacco use can raise cholesterol and make it harder to control. Quitting is tough. But millions of people have given up tobacco for good. You can quit, too! Think about some of the reasons below to quit smoking. Do any of them make you think twice about your smoking habit?  Stop smoking because it:  · Keeps your cholesterol high, even if you make all the other changes youre supposed to  · Damages your body. It especially harms your heart, lungs, skin, and blood vessels.  · Makes you more likely to have a heart attack (acute myocardial infarction), stroke, or cancer  · Stains your teeth  · Makes your skin, clothes, and breath smell bad  · Costs a lot of money  Controlling stress   Learn ways to control stress. This will help you deal with stress in your home and work life. Controlling stress can greatly lower your risk of getting cardiovascular disease.  Making the most of medicines  Healthy eating and exercise are a good start to keeping your cholesterol down. But you may need some extra help from medicine. If your doctor prescribes medicine, be sure to take it exactly as directed. Remember:  · Tell your healthcare provider about all other medicines you take. This includes vitamins and herbs.  · Tell your healthcare provider if you have any side effects after starting to take a medicine. Examples of side effects to watch for include muscle aches, weakness, blurred vision, rust-colored urine, yellowing of eyes or skin (jaundice), and headache.  · Dont skip a dose or stop taking your medicine because you feel better or because  your cholesterol numbers go down. Never stop taking your medicine unless your healthcare provider has told you its OK.  · Ask your healthcare provider if you have any questions about your medicines.  High risk groups  Some people may need to take medicines called statins to control their cholesterol. This is in addition to eating a healthy diet and getting regular exercise.  Statins can help you stay healthy. They can also help prevent a heart attack or stroke. You may need to take a statin if you are in one of these groups:  · Adults who have had a heart attack or stroke. Or adults who have had peripheral vascular disease, a ministroke (transient ischemic attack), or stable or unstable angina. This group also includes people who have had a procedure to restore blood flow through a blocked artery. These procedures include percutaneous coronary intervention, angioplasty, stent, and open-heart bypass surgery.  · Adults who have diabetes. Or adults who are at higher risk of having a heart attack or stroke and have an LDL cholesterol level of 70 to 189 mg/dL  · Adults who are 21 years old or older and have an LDL cholesterol level of 190 mg/dL or higher.  If you are in a high-risk group, talk with your healthcare provider about your treatment goals. Make sure you understand why these goals are important, based on your own health history and your family history of heart disease or high cholesterol.  Make a plan to have regular cholesterol checks. You may need to fast before getting this test. Also ask your provider about any side effects your medicines may cause. Let your provider know about any side effects you have. You may need to take more than one medicine to reach the cholesterol goals that you and your provider decide on.  Date Last Reviewed: 10/1/2016  © 8143-2272 [employer]. [address] [place] [address], [place] [address]. All rights reserved. This information is not intended as a substitute for  professional medical care. Always follow your healthcare professional's instructions.

## 2018-01-26 ENCOUNTER — LAB VISIT (OUTPATIENT)
Dept: LAB | Facility: HOSPITAL | Age: 53
End: 2018-01-26
Attending: NURSE PRACTITIONER
Payer: COMMERCIAL

## 2018-01-26 DIAGNOSIS — E83.110 HEREDITARY HEMOCHROMATOSIS: ICD-10-CM

## 2018-01-26 LAB
ALBUMIN SERPL BCP-MCNC: 4.3 G/DL
ALP SERPL-CCNC: 67 U/L
ALT SERPL W/O P-5'-P-CCNC: 28 U/L
AST SERPL-CCNC: 26 U/L
BILIRUB DIRECT SERPL-MCNC: 0.1 MG/DL
BILIRUB SERPL-MCNC: 0.4 MG/DL
PROT SERPL-MCNC: 7.9 G/DL

## 2018-01-26 PROCEDURE — 36415 COLL VENOUS BLD VENIPUNCTURE: CPT

## 2018-01-26 PROCEDURE — 80076 HEPATIC FUNCTION PANEL: CPT

## 2018-01-26 PROCEDURE — 80321 ALCOHOLS BIOMARKERS 1OR 2: CPT

## 2018-01-26 PROCEDURE — 82728 ASSAY OF FERRITIN: CPT

## 2018-01-27 LAB — FERRITIN SERPL-MCNC: 624 NG/ML

## 2018-02-01 ENCOUNTER — TELEPHONE (OUTPATIENT)
Dept: HEPATOLOGY | Facility: CLINIC | Age: 53
End: 2018-02-01

## 2018-02-01 LAB — PHOSPHATIDYLETHANOL (PETH): 96 NG/ML

## 2018-02-01 NOTE — TELEPHONE ENCOUNTER
Attempted to call pt to discuss labs, no answer, left VM for call back.    Will speak to pt when she calls to discuss recent labs. Will likely continue to monitor labs for now with repeat labs at f/u visit.

## 2018-02-02 NOTE — TELEPHONE ENCOUNTER
MA attempted to call patient also she is unable to reached left her VM to please call us back. TOMY

## 2018-02-05 ENCOUNTER — TELEPHONE (OUTPATIENT)
Dept: HEPATOLOGY | Facility: CLINIC | Age: 53
End: 2018-02-05

## 2018-02-05 DIAGNOSIS — E83.110 HEREDITARY HEMOCHROMATOSIS: Primary | ICD-10-CM

## 2018-02-05 NOTE — TELEPHONE ENCOUNTER
Called and spoke with pt about labs. Pt has resumed drinking periodically. She does not want to remain completely abstinent from alcohol x next 3 months. Advised we can do phlebotomy Q 4 weeks in late Feb and March and see what labs look like in April. Pt agrees to proceed with this.     Please coordinate phlebotomy at [place] with labs for CBC, CMP, ferritin in late Feb and late March.

## 2018-02-05 NOTE — TELEPHONE ENCOUNTER
----- Message from Mary Lacey sent at 2/2/2018 11:07 AM CST -----  Contact: Pt  Pt returning Bernadette call from yesterday.       Call

## 2018-02-08 NOTE — TELEPHONE ENCOUNTER
MA called patient to inform her that she will need to call [facility] at [facility] (Blood donor room/Education center)    Phone# [phone]  Fax# [phone]    Faxed Phlebotomy and labs order to [facility]>     Patient said she will call end of FEB to schedule. TOMY

## 2018-03-22 ENCOUNTER — LAB VISIT (OUTPATIENT)
Dept: LAB | Facility: HOSPITAL | Age: 53
End: 2018-03-22
Attending: NURSE PRACTITIONER
Payer: COMMERCIAL

## 2018-03-22 DIAGNOSIS — E83.110 HEREDITARY HEMOCHROMATOSIS: ICD-10-CM

## 2018-03-22 LAB
ALBUMIN SERPL BCP-MCNC: 4 G/DL
ALP SERPL-CCNC: 73 U/L
ALT SERPL W/O P-5'-P-CCNC: 13 U/L
ANION GAP SERPL CALC-SCNC: 9 MMOL/L
AST SERPL-CCNC: 24 U/L
BASOPHILS # BLD AUTO: 0.01 K/UL
BASOPHILS NFR BLD: 0.3 %
BILIRUB SERPL-MCNC: 0.4 MG/DL
BUN SERPL-MCNC: 12 MG/DL
CALCIUM SERPL-MCNC: 9.4 MG/DL
CHLORIDE SERPL-SCNC: 106 MMOL/L
CO2 SERPL-SCNC: 26 MMOL/L
CREAT SERPL-MCNC: 0.9 MG/DL
DIFFERENTIAL METHOD: ABNORMAL
EOSINOPHIL # BLD AUTO: 0.1 K/UL
EOSINOPHIL NFR BLD: 2.9 %
ERYTHROCYTE [DISTWIDTH] IN BLOOD BY AUTOMATED COUNT: 12.7 %
EST. GFR  (AFRICAN AMERICAN): >60 ML/MIN/1.73 M^2
EST. GFR  (NON AFRICAN AMERICAN): >60 ML/MIN/1.73 M^2
GLUCOSE SERPL-MCNC: 147 MG/DL
HCT VFR BLD AUTO: 38.9 %
HGB BLD-MCNC: 13 G/DL
LYMPHOCYTES # BLD AUTO: 0.7 K/UL
LYMPHOCYTES NFR BLD: 22.9 %
MCH RBC QN AUTO: 32.3 PG
MCHC RBC AUTO-ENTMCNC: 33.4 G/DL
MCV RBC AUTO: 97 FL
MONOCYTES # BLD AUTO: 0.6 K/UL
MONOCYTES NFR BLD: 20.3 %
NEUTROPHILS # BLD AUTO: 1.7 K/UL
NEUTROPHILS NFR BLD: 53.6 %
PLATELET # BLD AUTO: 167 K/UL
PMV BLD AUTO: 10.7 FL
POTASSIUM SERPL-SCNC: 3.8 MMOL/L
PROT SERPL-MCNC: 7.4 G/DL
RBC # BLD AUTO: 4.03 M/UL
SODIUM SERPL-SCNC: 141 MMOL/L
WBC # BLD AUTO: 3.15 K/UL

## 2018-03-22 PROCEDURE — 82728 ASSAY OF FERRITIN: CPT

## 2018-03-22 PROCEDURE — 80053 COMPREHEN METABOLIC PANEL: CPT

## 2018-03-22 PROCEDURE — 85025 COMPLETE CBC W/AUTO DIFF WBC: CPT

## 2018-03-22 PROCEDURE — 36415 COLL VENOUS BLD VENIPUNCTURE: CPT

## 2018-03-23 ENCOUNTER — TELEPHONE (OUTPATIENT)
Dept: HEPATOLOGY | Facility: CLINIC | Age: 53
End: 2018-03-23

## 2018-03-23 LAB — FERRITIN SERPL-MCNC: 397 NG/ML

## 2018-03-23 NOTE — TELEPHONE ENCOUNTER
MA called patient schedule her labs on 4/4/18 in [facility]. Mailed appt reminder to patient.[email or web address]

## 2018-03-27 ENCOUNTER — OFFICE VISIT (OUTPATIENT)
Dept: NEUROLOGY | Facility: CLINIC | Age: 53
End: 2018-03-27
Payer: COMMERCIAL

## 2018-03-27 VITALS
DIASTOLIC BLOOD PRESSURE: 84 MMHG | SYSTOLIC BLOOD PRESSURE: 114 MMHG | HEART RATE: 64 BPM | HEIGHT: 62 IN | RESPIRATION RATE: 16 BRPM | BODY MASS INDEX: 34.72 KG/M2 | WEIGHT: 188.69 LBS

## 2018-03-27 DIAGNOSIS — G20.A1 PARKINSON DISEASE: Primary | ICD-10-CM

## 2018-03-27 DIAGNOSIS — G56.01 CARPAL TUNNEL SYNDROME OF RIGHT WRIST: ICD-10-CM

## 2018-03-27 DIAGNOSIS — R53.83 OTHER FATIGUE: ICD-10-CM

## 2018-03-27 PROCEDURE — 99214 OFFICE O/P EST MOD 30 MIN: CPT | Mod: S$GLB,,, | Performed by: PSYCHIATRY & NEUROLOGY

## 2018-03-27 PROCEDURE — 99999 PR PBB SHADOW E&M-EST. PATIENT-LVL IV: CPT | Mod: PBBFAC,,, | Performed by: PSYCHIATRY & NEUROLOGY

## 2018-03-27 RX ORDER — HEPATITIS A AND HEPATITIS B (RECOMBINANT) VACCINE 720; 20 [IU]/ML; UG/ML
INJECTION, SUSPENSION INTRAMUSCULAR
COMMUNITY
Start: 2018-02-21 | End: 2019-01-17

## 2018-03-27 RX ORDER — IBUPROFEN 100 MG/5ML
1000 SUSPENSION, ORAL (FINAL DOSE FORM) ORAL DAILY
COMMUNITY
End: 2019-09-11

## 2018-03-27 NOTE — PROGRESS NOTES
HPI:  Rachel Leal is a 53 y.o. female with C spine MRI showed mild degenerative changes but some NF narrowing by Xray.  EMG showed Left Carpal tunnel syndrome (mild to moderate) and  Borderli  ne/ Early Carpal Tunnel Syndrome on the Right now s/p Left CTR  And over 3 years of right arm jumping after stretching and sometimes decreased strength/fine movement -- progressing to right and arm leg tremor and right bradykinesia all suggesting Parkinson's disease.   Since the last visit, patient saw Dr Hinson and was thought to have PD, 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     Her hemochromatosis diagnosis was also considered  She is seeing Hepatology for this. They last noted she drinks up to 20 beers per week and they asked her to abstain for 4 weeks prior to any phlebotomy - she had this in February and March  She states this has not changed symptoms.   She is still taking sinemet once daily on most days and some days BID but this is only once every two months  Some fatigue noted  She tried the gym but cant tolerate- excuse given today- she will exercise at her home gym  Neck pain is well controlled, no CTS symptoms currently  Tremor is tolerable    Review of Systems   Constitutional: Positive for malaise/fatigue. Negative for fever.   HENT: Negative for nosebleeds.    Eyes: Negative for double vision.   Respiratory: Negative for shortness of breath.    Cardiovascular: Negative for leg swelling.   Gastrointestinal: Negative for blood in stool.   Genitourinary: Negative for hematuria.   Musculoskeletal: Negative for falls.   Skin: Negative for rash.   Neurological: Positive for tremors.   Psychiatric/Behavioral: Negative for memory loss.       Exam:  Gen Appearance, well developed/nourished in no apparent distress  CV: 2+ distal pulses with no edema or swelling  Neuro:  MS: Awake, alert,  Sustains attention. Recent/remote memory intact, Language is full to spontaneous speech/comprehension. Fund of Knowledge is full  CN: Optic discs  are flat with normal vasculature, PERRL, Extraoccular movements and visual fields are full. Normal facial sensation and strength,  Tongue and Palate are midline and strong. Shoulder Shrug symmetric and strong.  Motor: Normal bulk, tone is increased with mild rigidity on the right arm. Very fleeting resting tremor  At times in the hands,  She is bradykinetic on the right with finger tapping than left- mildly improved on sinemet again today . 5/5 strength bilateral upper/lower extremities with 2+ reflexes   Sensory: symmetric to  temp, and vibration.  Romberg negative  Cerebellar: Finger-nose, Rapid alternating movements intact  Gait: Normal stance, no ataxia and no enbloc turning but reduced arm swing on the right. No shuffling and good postural reflexes.         EMG 2014:   1. Left Carpal tunnel syndrome (mild to moderate)  2. Borderline/ Early Carpal Tunnel Syndrome on the Right      12/2015 MRI brain : No significant abnormalities.   Some mild white matter disease, very mild        Assessment/Plan: Rachel Leal is a 53 y.o. female with C spine MRI showed mild degenerative changes but some NF narrowing by Xray.  EMG showed Left Carpal tunnel syndrome (mild to moderate) and  Borderli  ne/ Early Carpal Tunnel Syndrome on the Right now s/p Left CTR  And over 3 years of right arm jumping after stretching and sometimes decreased strength/fine movement -- progressing to right and arm leg tremor and right bradykinesia all suggesting Parkinson's disease.   I recommend:   1. PD thought to be genetic per movement disorder's clinic, Dr Hinson- will send back if/when needed for DBS consideration if able  2. Note her Hereditary hemochromatosis. She is a homozygote for K7445Y. Note idiopathic PD has been reported in this condition along with other basal ganglia changes related to iron deposition. Her prior MRI brain was unremarkable. Hemochromatosis is currently followed and treated by hepatology  3. Mirapex and Requip  caused elevated BP, she could not tolerate neuopro patch due to skin reaction. Hesitant to start Azilect at this time given her side effects prior. Can consider am amandatine for PD and likely PD related fatigue at any point- she declines today  4.  Sinemet is well tolerated but not an ideal long term treatment as she will be at risk of dyskinesia (reviewed today)- no changes made from movement disorders specialist.    5. Her Cervical neck pain is also resolved and her CTS Symptoms on the right are not active    RTC in 1 year per her request- sooner if worse

## 2018-04-04 ENCOUNTER — LAB VISIT (OUTPATIENT)
Dept: LAB | Facility: HOSPITAL | Age: 53
End: 2018-04-04
Attending: NURSE PRACTITIONER
Payer: COMMERCIAL

## 2018-04-04 DIAGNOSIS — E83.110 HEREDITARY HEMOCHROMATOSIS: ICD-10-CM

## 2018-04-04 LAB
ALBUMIN SERPL BCP-MCNC: 3.6 G/DL
ALP SERPL-CCNC: 64 U/L
ALT SERPL W/O P-5'-P-CCNC: 24 U/L
ANION GAP SERPL CALC-SCNC: 8 MMOL/L
AST SERPL-CCNC: 20 U/L
BASOPHILS # BLD AUTO: 0.02 K/UL
BASOPHILS NFR BLD: 0.5 %
BILIRUB SERPL-MCNC: 0.3 MG/DL
BUN SERPL-MCNC: 17 MG/DL
CALCIUM SERPL-MCNC: 9.7 MG/DL
CHLORIDE SERPL-SCNC: 107 MMOL/L
CO2 SERPL-SCNC: 27 MMOL/L
CREAT SERPL-MCNC: 0.8 MG/DL
DIFFERENTIAL METHOD: ABNORMAL
EOSINOPHIL # BLD AUTO: 0.1 K/UL
EOSINOPHIL NFR BLD: 2.5 %
ERYTHROCYTE [DISTWIDTH] IN BLOOD BY AUTOMATED COUNT: 12.8 %
EST. GFR  (AFRICAN AMERICAN): >60 ML/MIN/1.73 M^2
EST. GFR  (NON AFRICAN AMERICAN): >60 ML/MIN/1.73 M^2
FERRITIN SERPL-MCNC: 381 NG/ML
GLUCOSE SERPL-MCNC: 128 MG/DL
HCT VFR BLD AUTO: 37 %
HGB BLD-MCNC: 12.3 G/DL
LYMPHOCYTES # BLD AUTO: 0.9 K/UL
LYMPHOCYTES NFR BLD: 19.9 %
MCH RBC QN AUTO: 33.2 PG
MCHC RBC AUTO-ENTMCNC: 33.2 G/DL
MCV RBC AUTO: 100 FL
MONOCYTES # BLD AUTO: 0.5 K/UL
MONOCYTES NFR BLD: 11.2 %
NEUTROPHILS # BLD AUTO: 2.9 K/UL
NEUTROPHILS NFR BLD: 65.9 %
PLATELET # BLD AUTO: 173 K/UL
PMV BLD AUTO: 10.8 FL
POTASSIUM SERPL-SCNC: 4.2 MMOL/L
PROT SERPL-MCNC: 7 G/DL
RBC # BLD AUTO: 3.71 M/UL
SODIUM SERPL-SCNC: 142 MMOL/L
WBC # BLD AUTO: 4.37 K/UL

## 2018-04-04 PROCEDURE — 36415 COLL VENOUS BLD VENIPUNCTURE: CPT

## 2018-04-04 PROCEDURE — 82728 ASSAY OF FERRITIN: CPT

## 2018-04-04 PROCEDURE — 85025 COMPLETE CBC W/AUTO DIFF WBC: CPT

## 2018-04-04 PROCEDURE — 80053 COMPREHEN METABOLIC PANEL: CPT

## 2018-04-05 ENCOUNTER — OFFICE VISIT (OUTPATIENT)
Dept: HEPATOLOGY | Facility: CLINIC | Age: 53
End: 2018-04-05
Payer: COMMERCIAL

## 2018-04-05 VITALS
BODY MASS INDEX: 34.16 KG/M2 | HEART RATE: 90 BPM | TEMPERATURE: 98 F | RESPIRATION RATE: 18 BRPM | OXYGEN SATURATION: 96 % | HEIGHT: 62 IN | DIASTOLIC BLOOD PRESSURE: 76 MMHG | SYSTOLIC BLOOD PRESSURE: 112 MMHG | WEIGHT: 185.63 LBS

## 2018-04-05 DIAGNOSIS — K76.0 FATTY LIVER: ICD-10-CM

## 2018-04-05 DIAGNOSIS — R79.89 ELEVATED FERRITIN: ICD-10-CM

## 2018-04-05 DIAGNOSIS — E83.110 HEREDITARY HEMOCHROMATOSIS: Primary | ICD-10-CM

## 2018-04-05 DIAGNOSIS — E66.9 OBESITY (BMI 30-39.9): ICD-10-CM

## 2018-04-05 DIAGNOSIS — Z78.9 ALCOHOL USE: ICD-10-CM

## 2018-04-05 PROCEDURE — 99999 PR PBB SHADOW E&M-EST. PATIENT-LVL V: CPT | Mod: PBBFAC,,, | Performed by: NURSE PRACTITIONER

## 2018-04-05 PROCEDURE — 99214 OFFICE O/P EST MOD 30 MIN: CPT | Mod: SA,S$GLB,, | Performed by: NURSE PRACTITIONER

## 2018-04-05 RX ORDER — HYDROGEN PEROXIDE 3 %
20 SOLUTION, NON-ORAL MISCELLANEOUS
Qty: 30 CAPSULE | Refills: 0 | COMMUNITY
Start: 2018-04-05 | End: 2022-11-01

## 2018-04-05 NOTE — PATIENT INSTRUCTIONS
1. Last Hepatitis A/B vaccine 7/2018  2. Decrease beer intake to no more than 1 beer if possible   3. Follow up labs in 3 months, visit in 6 months (you will get letter to schedule)   4. No change to phlebotomy at this time

## 2018-04-05 NOTE — PROGRESS NOTES
[facility] Established Patient Visit    Reason for Visit:  F/u hemochromatosis    PCP: Cata Leal    HPI:  This is a 53 y.o. female here for f/u of evaluation for hemochromatosis. Her brother is a pt of mine, homozygote for C282Y. Her workup revealed she is also a homozygote for V4471M. Liver biopsy done 12/21/17 showed mild steatosis, macrovesicular 20% and microvesicular 10%. Mild to moderate siderosis, 2+, within hepatocytes. No fibrosis, no definitive evidence of steatohepatitis. Total iron weight = 6112 mcg/g, hepatic iron index = 2.0.     Ferritin 1/8/18 was 494, patient reported drinking regularly, pt instructed to significantly decrease alcohol intake and plan was to repeat ferritin in 4 weeks to determine phlebotomy plan. Ferritin 1/26/18 was 624, so phlebotomy q4 weeks was initiated. She completed phlebotomy 2/19/18 and 3/26/18 (ferritin 3/22/18 = 397, 4/4/18 = 381). H/H now lower limit of normal, 12.3/37.0. She has essentially normal liver enzymes in 20's with normal synthetic liver functioning. Plts 160-170's.     Patient continues to drink 6 beers at least twice weekly, did not decrease since last visit.     U/s done 11/2017 showed no splenomegaly, increased echogenicity in liver, can be seen with fatty liver or hemochromatosis. Fibroscan = F0-F1, no fibrosis. CAP = 279, moderate steatosis.     Started TwinxRix vaccines Jan 2018, has last vaccine due 7/2018 at [facility].     Risk factors for FLD includes obesity, HLD, and frequent alcohol use. She wants to try to start losing weight and is interested in bariatric medicine, has appt scheduled with ANABEL Daly in a couple of weeks.      She denies any symptoms of advanced chronic liver disease including jaundice, dark urine, hematemesis, melena, slowed mentation, abdominal distention.       ROS:   GENERAL: Denies fever, chills, weight loss/gain, fatigue  HEENT: Denies headaches, dizziness, vision/hearing  changes  CARDIOVASCULAR: Denies chest pain, palpitations, or edema  RESPIRATORY: Denies dyspnea, cough  GI: Denies abdominal pain, rectal bleeding, nausea, vomiting. No change in bowel pattern or color  : Denies dysuria, hematuria   SKIN: Denies rash, itching   NEURO: Denies confusion, memory loss, or mood changes  PSYCH: Denies depression or anxiety  HEME/LYMPH: Denies easy bruising or bleeding      PMHX:  has a past medical history of Cancer; Hereditary hemochromatosis (2015); History of TMJ syndrome; Hyperlipidemia; Hypoglycemia; and Parkinson disease.    PSHX:  has a past surgical history that includes Breast surgery; Eye surgery;  section; Hand surgery; Hysterectomy; Colonoscopy; Liver biopsy (2017); and Tonsillectomy.    The patient's social and family histories were reviewed by me and updated in the appropriate section of the electronic medical record.    Review of patient's allergies indicates:   Allergen Reactions    Neupro [rotigotine] Dermatitis       Current Outpatient Prescriptions on File Prior to Visit   Medication Sig Dispense Refill    ALPRAZolam (XANAX) 0.5 MG tablet Take 1 tablet (0.5 mg total) by mouth nightly as needed. 30 tablet 0    anastrozole (ARIMIDEX) 1 mg Tab Take 1 mg by mouth once daily.      ascorbic acid, vitamin C, (VITAMIN C) 1000 MG tablet Take 1,000 mg by mouth once daily.      carbidopa-levodopa  mg (SINEMET)  mg per tablet Take 1 tablet by mouth 2 (two) times daily. 180 tablet 4    COCONUT OIL ORAL Take 2 capsules by mouth once daily.      denosumab (XGEVA) 120 mg/1.7 mL (70 mg/mL) Soln Inject 120 mg into the skin. Every 3 months      GLUCOSAMINE HCL/CHONDROITIN ROLLE (GLUCOSAMINE-CHONDROITIN ORAL) Take 1 tablet by mouth once daily.      pravastatin (PRAVACHOL) 40 MG tablet Take 1 tablet (40 mg total) by mouth once daily. 90 tablet 3    venlafaxine (EFFEXOR-XR) 75 MG 24 hr capsule Take 75 mg by mouth once daily.       vitamin D 1000 units  "Tab Take 2,000 mg by mouth once daily.      JYOTSNA, PF, 720 Rosita unit -20 mcg/mL Syrg suspension       [DISCONTINUED] valacyclovir (VALTREX) 1000 MG tablet Take 1,000 mg by mouth 3 (three) times daily.  3     No current facility-administered medications on file prior to visit.          Objective Findings:    PHYSICAL EXAM:   Friendly White female, in no acute distress; alert and oriented to person, place and time  VITALS: /76 (BP Location: Right arm, Patient Position: Sitting, BP Method: Medium (Automatic))   Pulse 90   Temp 97.8 °F (36.6 °C) (Oral)   Resp 18   Ht 5' 2" (1.575 m)   Wt 84.2 kg (185 lb 10 oz)   LMP 01/17/2012   SpO2 96%   BMI 33.95 kg/m²   HENT: Normocephalic, without obvious abnormality. Oral mucosa pink and moist. Dentition good.  EYES: Sclerae anicteric.    NECK: Supple.  CARDIOVASCULAR: Regular rate and rhythm. No murmurs.  RESPIRATORY: Normal respiratory effort. BBS CTA. No wheezes or crackles.  GI: Soft, non-tender, non-distended. No hepatosplenomegaly. No masses palpable. No ascites.  EXTREMITIES:  No clubbing, cyanosis or edema.  SKIN: Warm and dry. No jaundice. No rashes noted to exposed skin. + few telangectasias noted to chest. No palmar erythema.  NEURO:  Normal gate. No asterixis.  PSYCH:  Memory intact. Thought and speech pattern appropriate. Behavior normal. No depression or anxiety noted.      Labs:  Lab Results   Component Value Date    WBC 4.37 04/04/2018    HGB 12.3 04/04/2018    HCT 37.0 04/04/2018     04/04/2018    CHOL 296 (H) 01/08/2018    TRIG 318 (H) 01/08/2018    HDL 55 01/08/2018     04/04/2018    K 4.2 04/04/2018    CREATININE 0.8 04/04/2018    ALT 24 04/04/2018    AST 20 04/04/2018    ALKPHOS 64 04/04/2018    BILITOT 0.3 04/04/2018    ALBUMIN 3.6 04/04/2018    INR 0.9 11/29/2017       ASSESSMENT:  53 y.o. White female with:  1.  Hereditary hemochromatosis  -- normal liver enzymes  -- homozygote for C282Y  -- last ferritin 4/4/18 = 381 after 2 " phlebotomies (2/2018, 3/2018), improved from 624 before phlebotomy but remains elevated, likely alcohol use contributing to elevation  -- Fibroscan 11/2017 = F0-F1, no fibrosis  -- liver biopsy 12/2017 - Mild steatosis, macrovesicular 20% and microvesicular 10%. Mild to moderate siderosis, 2+, within hepatocytes. No fibrosis  -- currently 2/3 TwinRix vaccines completed, last vaccine due 7/2018 at [facility]    2. Elevated ferritin, could be from iron overload but her alcohol use may be contributing as well  -- recommend she significantly decrease alcohol intake, patient agrees with plan and denies needing any professional help to decrease    3. Obesity  Body mass index is 33.95 kg/m².   -- appt with ANABEL Daly upcoming, bariatric medicine     4. Fatty liver disease   -- transaminases normal  -- US done 11/2017 shows fatty liver or hemochromatosis  -- synthetic liver function nl  -- risk factors for fatty liver include obesity, HLD, and her alcohol use may be contributing   -- liver biopsy -  Mild steatosis, macrovesicular 20% and microvesicular 10%. Mild to moderate siderosis, 2+, within hepatocytes. No fibrosis    5. Alcohol use  -- drinks 6 beers at a time, at least twice weekly   -- recommended to significantly decrease alcohol use, recommended no more than 1 beer in a day, recommended NOT to drink daily.  Do not want to do phlebotomy if her ferritin is elevated d/t alcohol use      EDUCATION:   Reassured pt no fibrosis on biopsy and only mild to moderate iron.   Discussed with patient that alcohol is likely contributing to elevated ferritin levels above what is expected after phlebotomy. Instructed patient to significantly decrease alcohol consumption, recommended no more than 1 beer in a day, DO not recommend drinking daily, especially given risk of worsening fatty liver disease with risk factors, including frequent alcohol use.     Will reassess in 3 months after weight loss efforts and  significant decrease in alcohol use.       PLAN:  1. Last dose of Twinrix vaccine due 7/2018 at [facility]   2. Decrease beer intake, no more than 1 beer in a day, NOT to drink daily   3. No phlebotomy at this time   4. Repeat labs in 3 months (CBC, CMP, ferritin and iron panel)  5. Continue to follow up with bariatric medicine   6. F/u in 6 months     Thank you for allowing me to participate in the care of LAMINE Rangel     I agree with the MINA's assessment and plan as outlined above.    MARIVEL PatelC

## 2018-04-09 RX ORDER — CARBIDOPA AND LEVODOPA 25; 100 MG/1; MG/1
1 TABLET ORAL 2 TIMES DAILY
Qty: 180 TABLET | Refills: 3 | Status: SHIPPED | OUTPATIENT
Start: 2018-04-09 | End: 2019-04-09 | Stop reason: SDUPTHER

## 2018-04-19 ENCOUNTER — INITIAL CONSULT (OUTPATIENT)
Dept: BARIATRICS | Facility: CLINIC | Age: 53
End: 2018-04-19
Payer: COMMERCIAL

## 2018-04-19 VITALS
HEIGHT: 62 IN | SYSTOLIC BLOOD PRESSURE: 130 MMHG | WEIGHT: 182.56 LBS | BODY MASS INDEX: 33.6 KG/M2 | HEART RATE: 72 BPM | DIASTOLIC BLOOD PRESSURE: 80 MMHG

## 2018-04-19 DIAGNOSIS — E66.9 OBESITY (BMI 30-39.9): ICD-10-CM

## 2018-04-19 DIAGNOSIS — K76.0 FATTY LIVER: ICD-10-CM

## 2018-04-19 DIAGNOSIS — G20.A1 PARKINSON DISEASE: ICD-10-CM

## 2018-04-19 PROCEDURE — 99244 OFF/OP CNSLTJ NEW/EST MOD 40: CPT | Mod: S$GLB,,, | Performed by: INTERNAL MEDICINE

## 2018-04-19 PROCEDURE — 99999 PR PBB SHADOW E&M-EST. PATIENT-LVL III: CPT | Mod: PBBFAC,,, | Performed by: INTERNAL MEDICINE

## 2018-04-19 RX ORDER — TOPIRAMATE 50 MG/1
50 CAPSULE, EXTENDED RELEASE ORAL DAILY
Qty: 30 CAPSULE | Refills: 3 | Status: SHIPPED | OUTPATIENT
Start: 2018-04-19 | End: 2018-10-09

## 2018-04-19 NOTE — LETTER
April 19, 2018      Bernadette Santos, NP  [address] [facility] [address]           [facility]  [address] [facility] [address]  [facility] [address]  Phone: [phone]  Fax: [phone]          Patient: Rachel Leal   MR Number: [id number]   YOB: 1965   Date of Visit: 4/19/2018       Dear Bernadette Santos:    Thank you for referring Rachel Leal to me for evaluation. Attached you will find relevant portions of my assessment and plan of care.    If you have questions, please do not hesitate to call me. I look forward to following Rachel Leal along with you.    Sincerely,    Baylee Daly MD    Enclosure  CC:  No Recipients    If you would like to receive this communication electronically, please contact [email or web address] or [phone] to request more information on [employer] Link access.    For providers and/or their staff who would like to refer a patient to [facility], please contact us through our one-stop-shop provider referral line, [facility], at [phone].    If you feel you have received this communication in error or would no longer like to receive these types of communications, please e-mail [email or web address]

## 2018-04-19 NOTE — PROGRESS NOTES
Subjective:       Patient ID: Rachel Leal is a 53 y.o. female.    Chief Complaint: Consult    CC: Weight.     Current attempts at weight loss: New pt to me, referred by Bernadette Santos, NP  [address] [place] [address] , with Patient Active Problem List:     Hyperlipidemia     Hx of breast cancer     Hereditary hemochromatosis     Anxiety     Parkinson disease     Fatty liver     Obesity (BMI 30-39.9)     Lab Results       Component                Value               Date                       ALT                      24                  04/04/2018                 AST                      20                  04/04/2018                 ALKPHOS                  64                  04/04/2018                 BILITOT                  0.3                 04/04/2018               Walking 30 min every other day. No change in eating habits.     Previous diet attempts: Atkins 20 years ago. Did well.     History of medication for loss:   checked today     Heaviest weight: 182#    Lightest weight: 135#    Goal weight: 160#      Last eye exam:  Last year. Denies Glaucoma.    Provider:    Typical eating patterns: Retired. Loves with . Pt does cooking.   Breakfast: Omelet if out. HN cheerios if home. Weekends: same.     Lunch: skips    Dinner: boiled crawfish. Steak with bread, fried onion, veg and mushrooms. Beef stew with potatoes.     Snacks: at night- chips or cashews, popcorn, candy, peanuts.     Beverages: water. Beer- 3 nights a week- 6 beers when her  is home.     Willingness to change: 5/10.    EKG:Normal sinus rhythm  Low voltage QRS  Borderline Abnormal ECG  No previous ECGs available    BMR: 1543      Review of Systems   Constitutional: Positive for diaphoresis. Negative for chills and fever.   Respiratory: Negative for apnea and shortness of breath.         +snores   Cardiovascular: Negative for chest pain and leg swelling.   Gastrointestinal: Negative for constipation and  "diarrhea.        + GERD   Genitourinary: Negative for difficulty urinating and dysuria.   Musculoskeletal: Positive for arthralgias and back pain.   Neurological: Positive for tremors and light-headedness. Negative for dizziness.   Psychiatric/Behavioral: Negative for decreased concentration. The patient is not nervous/anxious.        Objective:     /80   Pulse 72   Ht 5' 2" (1.575 m)   Wt 82.8 kg (182 lb 8.7 oz)   LMP 01/17/2012   BMI 33.39 kg/m²     Physical Exam   Constitutional: She is oriented to person, place, and time. She appears well-developed. No distress.   Obese     HENT:   Head: Normocephalic and atraumatic.   Eyes: EOM are normal. Pupils are equal, round, and reactive to light. No scleral icterus.   Neck: Normal range of motion. Neck supple. No thyromegaly present.   Cardiovascular: Normal rate and normal heart sounds.  Exam reveals no gallop and no friction rub.    No murmur heard.  Pulmonary/Chest: Effort normal and breath sounds normal. No respiratory distress. She has no wheezes.   Abdominal: Soft. Bowel sounds are normal. She exhibits no distension. There is no tenderness.   Musculoskeletal: Normal range of motion. She exhibits no edema.   Neurological: She is alert and oriented to person, place, and time. No cranial nerve deficit.   Skin: Skin is warm and dry. No erythema.   Psychiatric: She has a normal mood and affect. Her behavior is normal. Judgment normal.   Vitals reviewed.      Assessment:       1. Obesity (BMI 30-39.9)    2. Fatty liver    3. Parkinson disease        Plan:         1. Obesity (BMI 30-39.9)  Patient was informed that topiramate is used for migraine prevention and seizures. Weight loss is a common side effect that is well documented. She understands this. She was informed of the potential side effects such as serious and possibly fatal rash in which case the medication should be discontinued immediately. Paresthesias, forgetfulness, fatigue, kidney stones, GI " symptoms, and changes in lab values such as electrolytes, blood counts and kidney function.    Add some type of resistance training 2-3 days a week. These can be body weight exercises, light weight or elastic bands. [employer] and [employer] are great sources for free work out plans and videos.       3 meals a day made up of the following:  Unlimited green vegetables, tomatoes, mushrooms, spaghetti squash, cauliflower, meat, poultry, seafood, eggs and hard cheeses.   Milk and plain yogurt  Dressings, seasonings, condiments, etc should have less than 2 g sugars.   Beans (1-1.5 cups) or nuts (1/4 cup) can have 1 x a day.   1-2 servings of citrus fruits, berries, pineapple or melon a day (1/2 cup)  Avoid fried foods    No grains, rice, pasta, potatoes, bread, corn, peas, oatmeal, grits, tortillas, crackers, chips    No soda, sweet tea, juices or lemonade    [email or web address] for recipes. Moderate carb intake    Limit alcohol to 4 drinks per week.         2. Fatty liver  Discussed with patient that the only treatment for fatty liver is to lose weight.  Without doing so, it may progress to cirrhosis, permanent liver damage and possibly liver failure. Expect improvement with weight loss.      3. Parkinson disease  Avoid stimulant anorectics and contrave 2/2 to tremor SE.

## 2018-04-19 NOTE — PATIENT INSTRUCTIONS
Patient was informed that topiramate is used for migraine prevention and seizures. Weight loss is a common side effect that is well documented. She understands this. She was informed of the potential side effects such as serious and possibly fatal rash in which case the medication should be discontinued immediately. Paresthesias, forgetfulness, fatigue, kidney stones, GI symptoms, and changes in lab values such as electrolytes, blood counts and kidney function.    Add some type of resistance training 2-3 days a week. These can be body weight exercises, light weight or elastic bands. [employer] and [employer] are great sources for free work out plans and videos.       3 meals a day made up of the following:  Unlimited green vegetables, tomatoes, mushrooms, spaghetti squash, cauliflower, meat, poultry, seafood, eggs and hard cheeses.   Milk and plain yogurt  Dressings, seasonings, condiments, etc should have less than 2 g sugars.   Beans (1-1.5 cups) or nuts (1/4 cup) can have 1 x a day.   1-2 servings of citrus fruits, berries, pineapple or melon a day (1/2 cup)  Avoid fried foods    No grains, rice, pasta, potatoes, bread, corn, peas, oatmeal, grits, tortillas, crackers, chips    No soda, sweet tea, juices or lemonade    [email or web address].dietdoctor.Drop Messages for recipes. Moderate carb intake    Limit alcohol to 4 drinks per week.       Meal Ideas for Regular Bariatric Diet  *Recipes and products available at www.bariatriceating.com  *You can substitute regular dairy and/or dressings, and whole eggs for egg whites in the ideas below.       Breakfast: (15-20g protein)    - Egg white omelet: 2 egg whites or ½ cup Egg Beaters. (Optional proteins: cheese, shrimp, black beans, chicken, sliced turkey) (Optional veggies: tomatoes, salsa, spinach, mushrooms, onions, green peppers, or small slice avocado)     - Egg and sausage: 1 egg or ¼ cup Egg Beaters (any variety), with 1 wilfredo or 2 links of Turkey sausage or Veggie breakfast sausage  ([facility] or [facility])    - Crust-less breakfast quiche: To make a glass pie dish, mix 4oz part skim Ricotta, 1 cup skim milk, and 2 eggs as your base. Add protein: shredded cheese, sliced lean ham or turkey, turkey bocanegra/sausage. Add veggies: tomato, onion, green onion, mushroom, green pepper, spinach, etc.    - Yogurt parfait: Mix 1 - 6oz container Dannon Light N Fit vanilla yogurt, with ¼ cup Kashi Go Lean cereal    - Cottage cheese and fruit: ½ cup part-skim cottage cheese or ricotta cheese topped with fresh fruit or sugar free preserves     - Shagufta Atkinson's Vanilla Egg custard* (add 2 Tbsp instant coffee granules to make Cappuccino Custard*)    - Hi-Protein café latte (skim milk, decaf coffee, 1 scoop protein powder). Optional to add Sugar free syrup or extract flavoring.    Lunch: (20-30g protein)    - ½ cup Black bean soup (Homemade or Progresso), with ¼ cup shredded low-fat cheese. Top with chopped tomato or fresh salsa.     - Lean deli turkey breast and low-fat sliced cheese, mustard or light arguelles to moisten, rolled up together, or wrapped in a Dante lettuce leaf    - Chicken salad made from dinner leftovers, moisten with low-fat salad dressing or light arguelles. Also try leftover salmon, shrimp, tuna or boiled eggs. Serve ½ cup over dark green salad    - Fat-free canned refried beans, topped with ¼ cup shredded low-fat cheese. Top with chopped tomato or fresh salsa.     - Greek salad: Top mixed greens with 1-2oz grilled chicken, tomatoes, red onions, 2-3 kalamata olives, and sprinkle lightly with feta cheese. Spritz with Balsamic vinegar to taste.     - Crust-less lunch quiche: To make a glass pie dish, mix 4oz part skim Ricotta, 1 cup skim milk, and 2 eggs as your base. Add protein: shredded cheese, sliced lean ham or turkey, shrimp, chicken. Add veggies: tomato, onion, green onion, mushroom, green pepper, spinach, artichoke, broccoli, etc.    - Pizza bake: tomato sauce, low-fat shredded mozzarella and  turkey pepperoni or Hunt bocanegra. Add any veggies.    - Cucumber crab bites: Spread ¼ cup crab dip (lump crabmeat + light cream cheese and green onions) over sliced cucumber.     - Chicken with light spinach and artichoke dip*: Puree in : 6oz cooked and drained spinach, 2 cloves garlic, 1 can cannelloni beans, ½ cup chopped green onions, 1 can drained artichoke hearts (not marinated in oil), lemon juice and basil. Mix in 2oz chopped up chicken.    Supper: (20-30g protein)    - Serve grilled fish over dark green salad tossed with low-fat dressing, served with grilled asparagus burns     - Rotisserie chicken salad: served with sliced strawberries, walnuts, fat-free feta cheese crumbles and 1 tbsp Blackmons Own Light Raspberry [place] Vinaigrette    - Shrimp cocktail: Dip cold boiled shrimp in homemade low-sugar cocktail sauce (1/2 cup Cristobal One Carb ketchup, 2 tbsp horseradish, 1/4 tsp hot sauce, 1 tsp Worcestershire sauce, 1 tbsp freshly-squeezed lemon juice). Serve with dark green salad, walnuts, and crumbled blue cheese drizzled with olive oil and Balsamic vinegar    - Tuna Melt: Spread tuna salad onto 2 thick slices of tomato. Top with low-fat cheese and broil until cheese is melted. May also be made with chicken salad of shrimp salad. [place] with different types of cheeses.    - Homemade low-fat Chili using extra lean ground beef or ground turkey. Top with shredded cheese and salsa as desired. May add dollop fat-free sour cream if desired    - Dinner Omelet with shrimp or chicken and onion, green peppers and chives.    - No noodle lasagna: Use sliced zucchini or eggplant in place of noodles.  Layer with part skim ricotta cheese and low sugar meat sauce (use very lean ground beef or ground turkey).    - Mexican chicken bake: Bake chunks of chicken breast or thigh with taco seasoning, Pace brand enchilada sauce, green onions and low-fat cheese. Serve with ¼ cup black beans or fat free refried  beans topped with chopped tomatoes or salsa.    - April frozen meatballs, simmered in Classico Marinara sauce. Different flavors of salsa or spaghetti sauce create different dishes! Sprinkle with parmesan cheese. Serve with grilled or steamed veggies, or a dark green salad.    - Simmer boneless skinless chicken thigh chunks in Classico Marinara sauce or roasted salsa until tender with chopped onion, bell pepper, garlic, mushrooms, spinach, etc.     - Hamburger, without the bun, dressed the way you like. Served with grilled or steamed veggies.    - Eggplant parmesan: Bake slices of eggplant at 350 degrees for 15 minutes. Layer tomato sauce, sliced eggplant and low-fat mozzarella cheese in a baking dish and cover with foil. Bake 30-40 more minutes or until bubbly. Uncover and bake at 400 degrees for about 15 more minutes, or until top is slightly crisp.    - Fish tacos: grilled/baked white fish, wrapped in Dante lettuce leaf, topped with salsa, shredded low-fat cheese, and light coleslaw.    Snacks: (100-200 calories; >5g protein)    - 1 low-fat cheese stick with 8 cherry tomatoes or 1 serving fresh fruit  - 4 thin slices fat-free turkey breast and 1 slice low-fat cheese  - 4 thin slices fat-free honey ham with wedge of melon  - 1/4 cup unsalted nuts with ½ cup fruit  - 6-oz container Dannon Light n Fit vanilla yogurt, topped with 1oz unsalted nuts         - apple, celery or baby carrots spread with 2 Tbsp natural peanut butter or almond butter   - apple slices with 1 oz slice low-fat cheese  - celery, cucumber, bell pepper or baby carrots dipped in ¼ cup hummus bean spread or light spinach and artichoke dip (*recipe in lunch section)  - 100 calorie bag microwave light popcorn with 3 tbsp grated parmesan cheese  - Javy Links Beef Steak - 14g protein! (similar to beef jerky)  - 2 wedges [place] - Light Herb & Garlic Cheese with sliced cucumber or green bell pepper  - 1/2 cup low-fat cottage cheese with ¼  cup fruit or ¼ cup salsa  - RTD Protein drinks: Atkins, Low Carb Slim Fast, EAS light, Muscle Milk Light, etc.  - Homemade Protein drinks: [employer] Soy95, Isopure, Nectar, UNJURY, Whey Gourmet, etc. Mix 1 scoop powder with 8oz skim/1% milk or light soymilk.  - Protein bars: Atkins, EAS, Pure Protein, Think Thin, Detour, etc. Must have 0-4 grams sugar - Read the label.    Takeout Options: No more than twice/week  Deli - Salads (no pasta or rice), meats, cheeses. Roasted chicken. Lox (salmon)    Mexican - Platters which don't include tortillas, chips, or rice. Go easy on the beans. Example: Fajitas without the tortillas. Ask the  not to bring chips to the table if they are too tempting.    Greek - Meat or fish and vegetable, but no bread or rice. Including hummus, baba ganoush, etc, is OK. Most sit-down Greek restaurants can provide you with cucumber slices for dipping instead of rashard bread.    Fast Food (Avoid as much as possible) - Salads (no croutons and limit salad dressing to 2 tbsp), grilled chicken sandwich without the bun and ask for no arguelles. Michelles low fat chili or Taco Bell pintos and cheese.    BBQ - The meats are fine if you ask for sauces on the side, but most of the traditional side dishes are loaded with carbs. Lucio slaw, baked beans and BBQ sauce are typically made with sugar.    Chinese - Nothing deep-fried, no rice or noodles. Many Chinese sauces have starch and sugar in them, so you'll have to use your judgement. If you find that these sauces trigger cravings, or cause Dumping, you can ask for the sauce to be made without sugar or just use soy sauce.      Spring Recipes:    [place] Shake:     Ingredients  ½ cup low fat cottage cheese  ¼ cup vanilla protein powder  1/8 tsp mint extract  2-3 packets of stevia or artificial sweetener of choice  5-10 ice cubes (more or less depending on how thick you would like it)  4 oz of water  2-3 drops of green food coloring OR a small handful of spinach to make  "it green    Put all ingredients in  and  until desired consistency.      "Unstuffed" Cabbage Rolls:    Ingredients:  1 1/2 to 2 pounds lean ground beef or turkey  1 large onion, chopped  1 clove garlic, minced  1 small cabbage, chopped  2 cans (14.5 ounces each) diced tomatoes  1 can (8 ounces) tomato sauce  ¼ - ½ cup water depending on desired consistency  1 teaspoon ground black pepper, salt to taste    Spray large skillet with nonstick cookspray. Heat pan on medium heat. Sauté the onions until tender, and then add the ground beef (or turkey) and cook until the meat is browned.    Add the garlic, cook an additional minute before adding the remaining ingredients. Cover, reduce the heat and simmer about 25 minutes (or until the cabbage is  fork tender)        Not so Torres's Pie:    Ingredients  2 (or more) pounds of lean ground beef, or turkey  2 heads of cauliflower or 3 bags of frozen cauliflower, chopped  1 bag of frozen mixed veggies          (NO Corn, Peas or Potatoes!)  1-2 onions  1 egg  1 teaspoon each of basil, garlic powder, pepper, oregano and a little cayenne  4-5 sprays of I cant believe its not butter spray  4 ounces of fat free cream cheese (optional)  Low fat Cheese to top (optional)    Roast cauliflower in oven on 350* F for about 15-20 minutes, until browned and fork tender. (begin salcido meat while cauliflower is cooking). Place cooked cauliflower in . Add 4 ounces of fat free cream cheese, 4-5 sprays of I cant believe its not butter SPRAY, and spices and puree until creamy.     While cauliflower is roasting, brown meat in large skillet and season to taste. Set aside. Sauté diced onion in skillet until somewhat soft. Set aside with meat. Pour mixed veggies in the skillet to heat on low heat.     Mix the meat, onions, mixed veggies, raw egg and any additional seasonings and put in bottom of 9x13 baking dish. Spread mashed cauliflower mixture over it until " smooth.    Bake at 350 for approximately 30 minutes. Add cheese and bake 5 additional minutes (optional). Serve warm (or reheat later).    Crock Pot Balsamic Pork Tenderloin:    Ingredients:  1 tsp dried thyme  1 tsp ground pepper  ½ tsp salt  3 tbsp dried minced onion  2 tsp dried basil  ¼ cup low sodium broth  ½ cup balsamic vinegar  2 cloves garlic, minced  2 lbs Pork Tenderloin    Mix first 5 ingredients together. Rub pork tenderloin with dry seasoning mixture.  In a large sauté pan, heat ¼ cup balsamic vinegar and garlic on medium heat. Add pork to sauté escudero and sear, salcido all sides. Add low sodium broth, 1 tbsp at a time to keep tenderloin from sticking or use nonstick cookspray.     Transfer meat to crock pot. Pour remaining balsamic vinegar into sauté pan and continue  to stir for a few minutes to deglaze the pan. Pour juices over the top of the tenderloin in crockpot. Cook on high for 3 hours or until meat thermometer says 170*. Let rest 5-10 minutes and then slice.       Side Dish: Steamed carrots w/ garlic and kamilla    Ingredients:  2 garlic cloves, minced  1 lb baby carrots  5-6 sprays of I cant believe its not butter SPRAY  1 tsp minced peeled fresh kamilla  1 tbsp chopped fresh cilantro  ½ tsp grated lime rind  1 tbsp fresh lime juice   ¼ tsp salt    Prepare garlic; let stand 10 minutes. Steam carrots, covered in pot, about 10 minutes or until tender.     In a nonstick skillet over medium heat, spray pan w/ I cant believe its not butter SPRAY. Add garlic and kamilla and cook 1 minute, stirring constantly. Remove from  heat; stir in carrots, cilantro and remaining ingredients.         Side Dish: Green Bean Almondine    Ingredients:  1 pound fresh green beans, trimmed  1 tbsp yaya vinegar  1 ½ tsp water  1 tsp Dandre Mustard  ¼ tsp salt  ¼ tsp freshly ground black pepper  1 tbsp sliced almonds, toasted    Cook green beans in boiling water for 4 minutes or until crisp-tender. Drain and plunge  beans into ice water. Drain well. Pat beans dry w/ paper towel.     Combine vinegar, water, mustard, salt and pepper in a medium bowl. Add beans to vinegar mixture; toss to coat well. Sprinkle with toasted almonds.      How to Cook the Perfect Hard Boiled Egg:    Steam in water: Fill a large pot with 1 inch of water. Place steamer insert inside, cover, and bring to a boil over high heat. Add eggs to steamer basket, cover, and continue cooking 12 minute. If serving cold, immediately place eggs in a bowl of ice water and allow to cool for at least 15 minutes before peeling under cool running water. Store in the refrigerator for up to 5 days.    OR    Purchase Dash Go Rapid Egg Boiler: available @ [employer], [employer], [employer], [employer] for ~$15-$20      Classic Egg Salad Recipe:    Ingredients:  8 hard cooked eggs, peeled and coarsely chopped  4-6 tbsp of low fat or fat free arguelles  2 tbsp celery, chopped  2 tsp april mustard  Dash of cayenne pepper (for spice)  Black pepper, salt to taste    In a medium bowl, combine arguelles, celery, mustard and cayenne pepper with chopped eggs. Season w/ pepper and salt. Serve over Lettuce leaves.     Get Creative! Add chopped turkey bocanegra and tomato and serve on lettuce leaves for an egg-cellent BLT egg salad or mix lean diced ham, low fat cheddar and tomatoes for  egg salad    Tuna Deviled Eggs:    Ingredients:  12 hard boiled eggs  1 can of tuna packed in water  1 rib celery, diced  ¼ cup low fat arguelles  1 tsp mustard  Garlic powder, black pepper to taste  Dash of paprika (for finish)    Cut eggs in half lengthwise. Remove yolk and mash in bowl. In separate bowl, mix tuna, celery, low fat arguelles, mustard and seasonings.  Stir in egg yolks until blended. Stuff eggs and sprinkle dash of paprika      Bocanegra Jalapeno Deviled Eggs:    Ingredients:  12 hard boiled eggs, peeled  ½ cup low fat arguelles  1 ½ tsp rice vinegar  ¾ tsp ground mustard  ½ packet splenda  2 jalapenos, seeded and  chopped, 6 pieces turkey bocanegra, cooked crisp and crumbled  Dash of paprika (for finish)    Cut eggs in half lengthwise. Remove yolk and mash in bowl. Add arguelles, vinegar, spices and Splenda until well combined. Mix in jalapenos and bocanegra. Stuff eggs and sprinkle w/ dash of paprika      Sugar-Free High Protein Brownie Recipe:    Ingredients:  2 cups of pureed zucchini  4 oz fat free cream cheese  1 large egg  2 scoops chocolate protein powder  1 tbsp pure vanilla extract  1/3 cup unsweetened cocoa powder    ¼ tsp salt  2 tbsp chopped nuts  *8-9 packets of splenda or artificial sweetener of choice    Preheat oven to 350* F.     Line an 8x8 pan w/ parchment paper or spray with nonstick cookspray.     In a medium bowl, blend the fat free cream cheese with egg until creamy. Add chocolate protein powder and cocoa powder until creamy. Add vanilla extract. Stir in pureed zucchini and salt.     The batter will be thick, but not dry. If batter seems dry, add 1-2 tbsp water. Fold in Nuts. Pour batter in prepared pan.     Bake for 30 minutes. Allow to cool completely. Cut into squares and chill to semi-set.     *best if eaten within 2 days but may last 3-4 days in fridge.         Lemon Whip:    Ingredients:  Small box of sugar-free vanilla instant pudding mix  1 small packet of crystal light lemonade mix  2 cups skim milk or fat free fairlife milk  Sugar-free, fat free cool whip     Make sugar-free pudding using 2 cups skim milk according to package. Stir in 1 small packet of crystal light lemonade mix.  Fold in a dollop of sugar-free, fat free cool whip and stir to combine.     Home-made Sugar-free Pudding Pops:    Ingredients:  1 small pkg of sugar-free instant pudding mix (flavor of choice!)  2 cups fat free milk     Beat ingredients with whisk for 2 minutes. Pour into 6 paper or plastic cups or into a popsicle mold. Insert wooden pop stick in center of each cup.     Freeze for 5 hours or until firm. Peel off paper or pull out  of popsicle mold.     Variations: add sugar-free syrups to change up the flavor, such as sugar-free chocolate pudding + sugar free raspberry syrup = chocolate raspberry fudgesicle.

## 2018-04-20 ENCOUNTER — CLINICAL SUPPORT (OUTPATIENT)
Dept: INTERNAL MEDICINE | Facility: CLINIC | Age: 53
End: 2018-04-20
Payer: COMMERCIAL

## 2018-04-20 DIAGNOSIS — F41.9 ANXIETY: ICD-10-CM

## 2018-04-20 DIAGNOSIS — E83.119 HEMOCHROMATOSIS, UNSPECIFIED HEMOCHROMATOSIS TYPE: ICD-10-CM

## 2018-04-20 DIAGNOSIS — E66.09 CLASS 1 OBESITY DUE TO EXCESS CALORIES WITH BODY MASS INDEX (BMI) OF 34.0 TO 34.9 IN ADULT, UNSPECIFIED WHETHER SERIOUS COMORBIDITY PRESENT: ICD-10-CM

## 2018-04-20 DIAGNOSIS — E78.5 HYPERLIPIDEMIA, UNSPECIFIED HYPERLIPIDEMIA TYPE: ICD-10-CM

## 2018-04-20 LAB
ALBUMIN SERPL BCP-MCNC: 3.7 G/DL
ALP SERPL-CCNC: 63 U/L
ALT SERPL W/O P-5'-P-CCNC: 28 U/L
ANION GAP SERPL CALC-SCNC: 12 MMOL/L
AST SERPL-CCNC: 18 U/L
BASOPHILS # BLD AUTO: 0.01 K/UL
BASOPHILS NFR BLD: 0.3 %
BILIRUB SERPL-MCNC: 0.4 MG/DL
BUN SERPL-MCNC: 17 MG/DL
CALCIUM SERPL-MCNC: 9.3 MG/DL
CHLORIDE SERPL-SCNC: 109 MMOL/L
CHOLEST SERPL-MCNC: 217 MG/DL
CHOLEST/HDLC SERPL: 3.8 {RATIO}
CO2 SERPL-SCNC: 21 MMOL/L
CREAT SERPL-MCNC: 0.7 MG/DL
DIFFERENTIAL METHOD: ABNORMAL
EOSINOPHIL # BLD AUTO: 0.1 K/UL
EOSINOPHIL NFR BLD: 2.6 %
ERYTHROCYTE [DISTWIDTH] IN BLOOD BY AUTOMATED COUNT: 12.3 %
EST. GFR  (AFRICAN AMERICAN): >60 ML/MIN/1.73 M^2
EST. GFR  (NON AFRICAN AMERICAN): >60 ML/MIN/1.73 M^2
GLUCOSE SERPL-MCNC: 96 MG/DL
HCT VFR BLD AUTO: 39.8 %
HDLC SERPL-MCNC: 57 MG/DL
HDLC SERPL: 26.3 %
HGB BLD-MCNC: 13.3 G/DL
LDLC SERPL CALC-MCNC: 117.8 MG/DL
LYMPHOCYTES # BLD AUTO: 0.9 K/UL
LYMPHOCYTES NFR BLD: 24 %
MCH RBC QN AUTO: 32.8 PG
MCHC RBC AUTO-ENTMCNC: 33.4 G/DL
MCV RBC AUTO: 98 FL
MONOCYTES # BLD AUTO: 0.4 K/UL
MONOCYTES NFR BLD: 10 %
NEUTROPHILS # BLD AUTO: 2.5 K/UL
NEUTROPHILS NFR BLD: 63.1 %
NONHDLC SERPL-MCNC: 160 MG/DL
PLATELET # BLD AUTO: 200 K/UL
PMV BLD AUTO: 11.5 FL
POTASSIUM SERPL-SCNC: 4.1 MMOL/L
PROT SERPL-MCNC: 7 G/DL
RBC # BLD AUTO: 4.06 M/UL
SODIUM SERPL-SCNC: 142 MMOL/L
T4 FREE SERPL-MCNC: 0.89 NG/DL
TRIGL SERPL-MCNC: 211 MG/DL
TSH SERPL DL<=0.005 MIU/L-ACNC: 2.06 UIU/ML
WBC # BLD AUTO: 3.91 K/UL

## 2018-04-20 PROCEDURE — 80053 COMPREHEN METABOLIC PANEL: CPT

## 2018-04-20 PROCEDURE — 80061 LIPID PANEL: CPT

## 2018-04-20 PROCEDURE — 99999 PR PBB SHADOW E&M-EST. PATIENT-LVL I: CPT | Mod: PBBFAC,,,

## 2018-04-20 PROCEDURE — 84443 ASSAY THYROID STIM HORMONE: CPT

## 2018-04-20 PROCEDURE — 36415 COLL VENOUS BLD VENIPUNCTURE: CPT | Mod: S$GLB,,, | Performed by: INTERNAL MEDICINE

## 2018-04-20 PROCEDURE — 84439 ASSAY OF FREE THYROXINE: CPT

## 2018-04-20 PROCEDURE — 85025 COMPLETE CBC W/AUTO DIFF WBC: CPT

## 2018-05-07 ENCOUNTER — TELEPHONE (OUTPATIENT)
Dept: INTERNAL MEDICINE | Facility: CLINIC | Age: 53
End: 2018-05-07

## 2018-05-07 DIAGNOSIS — E78.5 HYPERLIPIDEMIA, UNSPECIFIED HYPERLIPIDEMIA TYPE: Primary | ICD-10-CM

## 2018-05-07 NOTE — TELEPHONE ENCOUNTER
----- Message from Jacinto Couch sent at 2018  2:04 PM CDT -----  Contact: SELF  Rachel Horvath  MRN: [id number]  : 1965  PCP: Cata Horvath  Home Phone      [phone]  Work Phone      Not on file.  Mobile          [phone]      MESSAGE: WAS SCHEDULED TO SEE DR HORVATH THIS WEEK. WANTS TO KNOW IF SOMEONE COULD JUST READ HER THE LAB RESULTS INSTEAD OF COMING IN FOR AN APPT. PLEASE CALL     [phone]

## 2018-09-20 ENCOUNTER — OFFICE VISIT (OUTPATIENT)
Dept: INTERNAL MEDICINE | Facility: CLINIC | Age: 53
End: 2018-09-20
Payer: COMMERCIAL

## 2018-09-20 VITALS
HEART RATE: 66 BPM | OXYGEN SATURATION: 97 % | RESPIRATION RATE: 14 BRPM | HEIGHT: 62 IN | WEIGHT: 190.5 LBS | DIASTOLIC BLOOD PRESSURE: 80 MMHG | BODY MASS INDEX: 35.06 KG/M2 | SYSTOLIC BLOOD PRESSURE: 124 MMHG

## 2018-09-20 DIAGNOSIS — R07.9 CHEST PAIN, UNSPECIFIED TYPE: Primary | ICD-10-CM

## 2018-09-20 PROCEDURE — 99213 OFFICE O/P EST LOW 20 MIN: CPT | Mod: S$GLB,,, | Performed by: INTERNAL MEDICINE

## 2018-09-20 PROCEDURE — 3008F BODY MASS INDEX DOCD: CPT | Mod: CPTII,S$GLB,, | Performed by: INTERNAL MEDICINE

## 2018-09-20 PROCEDURE — 99999 PR PBB SHADOW E&M-EST. PATIENT-LVL III: CPT | Mod: PBBFAC,,, | Performed by: INTERNAL MEDICINE

## 2018-09-20 NOTE — PROGRESS NOTES
Subjective:       Patient ID: Rachel Leal is a 53 y.o. female.    Chief Complaint: Chest Pain    Rachel Leal is a 53 y.o. Female  Well known to my partner .she   was at Dr Ross office  For LN enlargement .  She reported that when she was being numbed for procedure .  One episode of chest pain for 5 minutes ; mid chest pain ; no radiation  Nitro pill ; pain resolved.  Her BP was high . Since then pain free .  No prior h/o CAD.          Review of Systems   Constitutional: Positive for diaphoresis. Negative for activity change, fatigue, fever and unexpected weight change.   HENT: Negative for congestion, ear pain, hearing loss, rhinorrhea, sore throat and tinnitus.    Eyes: Negative for pain, redness and visual disturbance.   Respiratory: Negative for cough, shortness of breath and wheezing.    Cardiovascular: Positive for chest pain and palpitations. Negative for leg swelling.   Gastrointestinal: Negative for abdominal pain, blood in stool, constipation, diarrhea, nausea and vomiting.   Genitourinary: Negative for dysuria, frequency, pelvic pain and urgency.   Musculoskeletal: Negative for back pain, joint swelling and neck pain.   Skin: Negative for color change, rash and wound.   Neurological: Negative for dizziness, tremors, weakness, light-headedness and headaches.       Objective:      Physical Exam   Constitutional: She is oriented to person, place, and time. She appears well-developed and well-nourished. No distress.   HENT:   Head: Normocephalic and atraumatic.   Right Ear: External ear normal.   Left Ear: External ear normal.   Eyes: EOM are normal. Right eye exhibits no discharge. Left eye exhibits no discharge. No scleral icterus.   Neck: Neck supple. No thyromegaly present.   Cardiovascular: Normal rate and regular rhythm. Exam reveals no gallop and no friction rub.   No murmur heard.  Pulmonary/Chest: Effort normal and breath sounds normal. No respiratory distress. She has no  wheezes. She has no rales.   Abdominal: Soft. Bowel sounds are normal. She exhibits no distension. There is no tenderness.   Lymphadenopathy:     She has no cervical adenopathy.   Neurological: She is alert and oriented to person, place, and time. No cranial nerve deficit.   Skin: Skin is warm and dry.   Psychiatric: She has a normal mood and affect. Her behavior is normal.   Vitals reviewed.      Assessment:       1. Chest pain, unspecified type        Plan:   Rachel was seen today for chest pain.    Diagnoses and all orders for this visit:    Chest pain, unspecified type  -     Ambulatory referral to Cardiology  -     EKG 12-lead; Future  Start baby ASA daily   See cardiology .    If any more chest pain/discomfort run to ER ASAP.

## 2018-10-04 ENCOUNTER — TELEPHONE (OUTPATIENT)
Dept: ADMINISTRATIVE | Facility: HOSPITAL | Age: 53
End: 2018-10-04

## 2018-10-05 ENCOUNTER — TELEPHONE (OUTPATIENT)
Dept: INTERNAL MEDICINE | Facility: CLINIC | Age: 53
End: 2018-10-05

## 2018-10-05 NOTE — TELEPHONE ENCOUNTER
----- Message from Belia Ray MA sent at 10/5/2018  1:59 PM CDT -----  Contact: [place]  Rachel Leal  MRN: [id number]  : 1965  PCP: Cata Leal  Home Phone      [phone]  Work Phone      Not on file.  Mobile          [phone]      MESSAGE: Please reorder labs from 2018 and link to lab appointment on 10/8/2018.    Ext:[phone]

## 2018-10-06 ENCOUNTER — LAB VISIT (OUTPATIENT)
Dept: LAB | Facility: HOSPITAL | Age: 53
End: 2018-10-06
Attending: NURSE PRACTITIONER
Payer: COMMERCIAL

## 2018-10-06 DIAGNOSIS — E78.5 HYPERLIPIDEMIA, UNSPECIFIED HYPERLIPIDEMIA TYPE: ICD-10-CM

## 2018-10-06 DIAGNOSIS — E83.110 HEREDITARY HEMOCHROMATOSIS: ICD-10-CM

## 2018-10-06 LAB
ALBUMIN SERPL BCP-MCNC: 3.9 G/DL
ALBUMIN SERPL BCP-MCNC: 3.9 G/DL
ALP SERPL-CCNC: 59 U/L
ALP SERPL-CCNC: 59 U/L
ALT SERPL W/O P-5'-P-CCNC: 26 U/L
ALT SERPL W/O P-5'-P-CCNC: 26 U/L
ANION GAP SERPL CALC-SCNC: 11 MMOL/L
ANION GAP SERPL CALC-SCNC: 11 MMOL/L
AST SERPL-CCNC: 20 U/L
AST SERPL-CCNC: 20 U/L
BASOPHILS # BLD AUTO: 0.01 K/UL
BASOPHILS # BLD AUTO: 0.01 K/UL
BASOPHILS NFR BLD: 0.2 %
BASOPHILS NFR BLD: 0.2 %
BILIRUB SERPL-MCNC: 0.4 MG/DL
BILIRUB SERPL-MCNC: 0.4 MG/DL
BUN SERPL-MCNC: 14 MG/DL
BUN SERPL-MCNC: 14 MG/DL
CALCIUM SERPL-MCNC: 9.6 MG/DL
CALCIUM SERPL-MCNC: 9.6 MG/DL
CHLORIDE SERPL-SCNC: 106 MMOL/L
CHLORIDE SERPL-SCNC: 106 MMOL/L
CHOLEST SERPL-MCNC: 227 MG/DL
CHOLEST/HDLC SERPL: 4.5 {RATIO}
CO2 SERPL-SCNC: 24 MMOL/L
CO2 SERPL-SCNC: 24 MMOL/L
CREAT SERPL-MCNC: 0.8 MG/DL
CREAT SERPL-MCNC: 0.8 MG/DL
DIFFERENTIAL METHOD: ABNORMAL
DIFFERENTIAL METHOD: ABNORMAL
EOSINOPHIL # BLD AUTO: 0.1 K/UL
EOSINOPHIL # BLD AUTO: 0.1 K/UL
EOSINOPHIL NFR BLD: 2.5 %
EOSINOPHIL NFR BLD: 2.5 %
ERYTHROCYTE [DISTWIDTH] IN BLOOD BY AUTOMATED COUNT: 11.8 %
ERYTHROCYTE [DISTWIDTH] IN BLOOD BY AUTOMATED COUNT: 11.8 %
EST. GFR  (AFRICAN AMERICAN): >60 ML/MIN/1.73 M^2
EST. GFR  (AFRICAN AMERICAN): >60 ML/MIN/1.73 M^2
EST. GFR  (NON AFRICAN AMERICAN): >60 ML/MIN/1.73 M^2
EST. GFR  (NON AFRICAN AMERICAN): >60 ML/MIN/1.73 M^2
FERRITIN SERPL-MCNC: 468 NG/ML
GLUCOSE SERPL-MCNC: 96 MG/DL
GLUCOSE SERPL-MCNC: 96 MG/DL
HCT VFR BLD AUTO: 41.5 %
HCT VFR BLD AUTO: 41.5 %
HDLC SERPL-MCNC: 50 MG/DL
HDLC SERPL: 22 %
HGB BLD-MCNC: 14 G/DL
HGB BLD-MCNC: 14 G/DL
IRON SERPL-MCNC: 99 UG/DL
LDLC SERPL CALC-MCNC: 137.8 MG/DL
LYMPHOCYTES # BLD AUTO: 1.7 K/UL
LYMPHOCYTES # BLD AUTO: 1.7 K/UL
LYMPHOCYTES NFR BLD: 32.1 %
LYMPHOCYTES NFR BLD: 32.1 %
MCH RBC QN AUTO: 32.5 PG
MCH RBC QN AUTO: 32.5 PG
MCHC RBC AUTO-ENTMCNC: 33.7 G/DL
MCHC RBC AUTO-ENTMCNC: 33.7 G/DL
MCV RBC AUTO: 96 FL
MCV RBC AUTO: 96 FL
MONOCYTES # BLD AUTO: 0.6 K/UL
MONOCYTES # BLD AUTO: 0.6 K/UL
MONOCYTES NFR BLD: 10.7 %
MONOCYTES NFR BLD: 10.7 %
NEUTROPHILS # BLD AUTO: 2.8 K/UL
NEUTROPHILS # BLD AUTO: 2.8 K/UL
NEUTROPHILS NFR BLD: 54.5 %
NEUTROPHILS NFR BLD: 54.5 %
NONHDLC SERPL-MCNC: 177 MG/DL
PLATELET # BLD AUTO: 208 K/UL
PLATELET # BLD AUTO: 208 K/UL
PMV BLD AUTO: 10.6 FL
PMV BLD AUTO: 10.6 FL
POTASSIUM SERPL-SCNC: 4.2 MMOL/L
POTASSIUM SERPL-SCNC: 4.2 MMOL/L
PROT SERPL-MCNC: 7.5 G/DL
PROT SERPL-MCNC: 7.5 G/DL
RBC # BLD AUTO: 4.31 M/UL
RBC # BLD AUTO: 4.31 M/UL
SATURATED IRON: 38 %
SODIUM SERPL-SCNC: 141 MMOL/L
SODIUM SERPL-SCNC: 141 MMOL/L
TOTAL IRON BINDING CAPACITY: 260 UG/DL
TRANSFERRIN SERPL-MCNC: 176 MG/DL
TRIGL SERPL-MCNC: 196 MG/DL
TSH SERPL DL<=0.005 MIU/L-ACNC: 3.17 UIU/ML
WBC # BLD AUTO: 5.21 K/UL
WBC # BLD AUTO: 5.21 K/UL

## 2018-10-06 PROCEDURE — 85025 COMPLETE CBC W/AUTO DIFF WBC: CPT

## 2018-10-06 PROCEDURE — 80053 COMPREHEN METABOLIC PANEL: CPT

## 2018-10-06 PROCEDURE — 36415 COLL VENOUS BLD VENIPUNCTURE: CPT

## 2018-10-06 PROCEDURE — 80061 LIPID PANEL: CPT

## 2018-10-06 PROCEDURE — 82728 ASSAY OF FERRITIN: CPT

## 2018-10-06 PROCEDURE — 84443 ASSAY THYROID STIM HORMONE: CPT

## 2018-10-06 PROCEDURE — 83540 ASSAY OF IRON: CPT

## 2018-10-09 ENCOUNTER — OFFICE VISIT (OUTPATIENT)
Dept: HEPATOLOGY | Facility: CLINIC | Age: 53
End: 2018-10-09
Payer: COMMERCIAL

## 2018-10-09 ENCOUNTER — TELEPHONE (OUTPATIENT)
Dept: HEPATOLOGY | Facility: CLINIC | Age: 53
End: 2018-10-09

## 2018-10-09 ENCOUNTER — OFFICE VISIT (OUTPATIENT)
Dept: SURGERY | Facility: CLINIC | Age: 53
End: 2018-10-09
Payer: COMMERCIAL

## 2018-10-09 VITALS
SYSTOLIC BLOOD PRESSURE: 116 MMHG | HEIGHT: 62 IN | HEART RATE: 74 BPM | BODY MASS INDEX: 34.4 KG/M2 | WEIGHT: 186.94 LBS | DIASTOLIC BLOOD PRESSURE: 78 MMHG

## 2018-10-09 VITALS
HEART RATE: 94 BPM | OXYGEN SATURATION: 95 % | DIASTOLIC BLOOD PRESSURE: 75 MMHG | TEMPERATURE: 98 F | SYSTOLIC BLOOD PRESSURE: 114 MMHG | HEIGHT: 62 IN | BODY MASS INDEX: 34.64 KG/M2 | RESPIRATION RATE: 18 BRPM | WEIGHT: 188.25 LBS

## 2018-10-09 DIAGNOSIS — A63.0 ANAL CONDYLOMA: Primary | ICD-10-CM

## 2018-10-09 DIAGNOSIS — E66.9 OBESITY (BMI 30-39.9): ICD-10-CM

## 2018-10-09 DIAGNOSIS — K76.0 FATTY LIVER: ICD-10-CM

## 2018-10-09 DIAGNOSIS — E83.110 HEREDITARY HEMOCHROMATOSIS: Primary | ICD-10-CM

## 2018-10-09 DIAGNOSIS — E78.5 HYPERLIPIDEMIA, UNSPECIFIED HYPERLIPIDEMIA TYPE: ICD-10-CM

## 2018-10-09 DIAGNOSIS — R79.89 ELEVATED FERRITIN: ICD-10-CM

## 2018-10-09 PROCEDURE — 99999 PR PBB SHADOW E&M-EST. PATIENT-LVL V: CPT | Mod: PBBFAC,,, | Performed by: NURSE PRACTITIONER

## 2018-10-09 PROCEDURE — 3008F BODY MASS INDEX DOCD: CPT | Mod: CPTII,S$GLB,, | Performed by: COLON & RECTAL SURGERY

## 2018-10-09 PROCEDURE — 99999 PR PBB SHADOW E&M-EST. PATIENT-LVL III: CPT | Mod: PBBFAC,,, | Performed by: COLON & RECTAL SURGERY

## 2018-10-09 PROCEDURE — 99203 OFFICE O/P NEW LOW 30 MIN: CPT | Mod: S$GLB,,, | Performed by: COLON & RECTAL SURGERY

## 2018-10-09 PROCEDURE — 3008F BODY MASS INDEX DOCD: CPT | Mod: CPTII,S$GLB,, | Performed by: NURSE PRACTITIONER

## 2018-10-09 PROCEDURE — 99214 OFFICE O/P EST MOD 30 MIN: CPT | Mod: S$GLB,,, | Performed by: NURSE PRACTITIONER

## 2018-10-09 RX ORDER — LOSARTAN POTASSIUM AND HYDROCHLOROTHIAZIDE 12.5; 5 MG/1; MG/1
1 TABLET ORAL DAILY
Refills: 11 | COMMUNITY
Start: 2018-10-01 | End: 2020-02-12 | Stop reason: SDUPTHER

## 2018-10-09 NOTE — PATIENT INSTRUCTIONS
1. Will do phlebotomy in October, November, and December at [place] with labs in Nov and Dec for monitoring  2. Last hepatitis shot today in  pharmacy  3. Continue to minimize alcohol intake  4. F/u in 6 months (you'll get letter when it's time to schedule again)

## 2018-10-09 NOTE — TELEPHONE ENCOUNTER
Patient seen in clinic today 10/9/18 with Bernadette Santos NP.   The patient will need to have will need to have Phlebotomy monthly x 3 and labs with each Phlebotomy.  The patient would like to do it locally.  Call placed to [facility] at [phone].  Left a voicemail message with the patient's name and MRN # need to set up appts for phlebotomies.  Telephone number to the clinic left with the message.

## 2018-10-09 NOTE — PROGRESS NOTES
[facility] Established Patient Visit    Reason for Visit:  F/u hemochromatosis    PCP: Cata Leal    HPI:  This is a 53 y.o. female here for f/u of hereditary hemochromatosis. Her brother is a pt of mine, homozygote for C282Y. Her workup revealed she is also a homozygote for R3899C. Liver biopsy done 12/21/17 showed mild steatosis, macrovesicular 20% and microvesicular 10%. Mild to moderate siderosis, 2+, within hepatocytes. No fibrosis, no definitive evidence of steatohepatitis. Total iron weight = 6112 mcg/g, hepatic iron index = 2.0.     Ferritin 1/8/18 was 494, patient reported drinking regularly, pt instructed to significantly decrease alcohol intake and plan was to repeat ferritin in 4 weeks to determine phlebotomy plan. Ferritin 1/26/18 was 624, so phlebotomy q4 weeks was initiated. She completed phlebotomy 2/19/18 and 3/26/18 (ferritin 3/22/18 = 397, 4/4/18 = 381). H/H then became lower limit of normal, 12.3/37.0. She has had essentially normal liver enzymes in 20's with normal synthetic liver functioning. Plts 160-170's. Her recent labs remain stable. Ferritin higher 468 with normal serum iron and iron sat.     Patient reports she has decreased alcohol intake since last visit. Her  had diverticulitis twice over the summer and had to have surgery.     U/s done 11/2017 showed no splenomegaly, increased echogenicity in liver, can be seen with fatty liver or hemochromatosis. Fibroscan 11/2017 = F0-F1, no fibrosis. CAP = 279, moderate steatosis.     Started TwinxRix vaccines Jan 2018, overdue for last vaccine at [facility].     Risk factors for FLD includes obesity, HLD, and frequent alcohol use. She saw bariatric medicine but was worried about topamax being used for seizures so she did not take it.       She denies any symptoms of advanced chronic liver disease including jaundice, dark urine, hematemesis, melena, slowed mentation, abdominal distention.       ROS:    GENERAL: Denies fever, chills, weight loss/gain, fatigue  HEENT: Denies headaches, dizziness, vision/hearing changes  CARDIOVASCULAR: Denies chest pain, palpitations, or edema  RESPIRATORY: Denies dyspnea, cough  GI: Denies abdominal pain, rectal bleeding, nausea, vomiting. No change in bowel pattern or color  : Denies dysuria, hematuria   SKIN: Denies rash, itching   NEURO: Denies confusion, memory loss, or mood changes  PSYCH: Denies depression or anxiety  HEME/LYMPH: Denies easy bruising or bleeding      PMHX:  has a past medical history of Cancer, Hereditary hemochromatosis (2015), History of TMJ syndrome, Hyperlipidemia, Hypertension, Hypoglycemia, and Parkinson disease.    PSHX:  has a past surgical history that includes Breast surgery; Eye surgery;  section; Hand surgery; Hysterectomy; Colonoscopy; Liver biopsy (2017); Tonsillectomy; and BIOPSY LIVER AND ULTRASOUND (N/A, 2017).    The patient's social and family histories were reviewed by me and updated in the appropriate section of the electronic medical record.    Review of patient's allergies indicates:   Allergen Reactions    Neupro [rotigotine] Dermatitis       Current Outpatient Medications on File Prior to Visit   Medication Sig Dispense Refill    ALPRAZolam (XANAX) 0.5 MG tablet Take 1 tablet (0.5 mg total) by mouth nightly as needed. 30 tablet 0    anastrozole (ARIMIDEX) 1 mg Tab Take 1 mg by mouth once daily.      ascorbic acid, vitamin C, (VITAMIN C) 1000 MG tablet Take 1,000 mg by mouth once daily.      carbidopa-levodopa  mg (SINEMET)  mg per tablet TAKE 1 TABLET BY MOUTH 2 (TWO) TIMES DAILY. 180 tablet 3    COCONUT OIL ORAL Take 2 capsules by mouth once daily.      denosumab (XGEVA) 120 mg/1.7 mL (70 mg/mL) Soln Inject 120 mg into the skin. Every 3 months      esomeprazole (NEXIUM) 20 MG capsule Take 1 capsule (20 mg total) by mouth as needed. 30 capsule 0    losartan-hydrochlorothiazide 50-12.5 mg  "(HYZAAR) 50-12.5 mg per tablet Take 1 tablet by mouth once daily.  11    pravastatin (PRAVACHOL) 40 MG tablet Take 1 tablet (40 mg total) by mouth once daily. 90 tablet 3    venlafaxine (EFFEXOR-XR) 75 MG 24 hr capsule Take 75 mg by mouth once daily.       vitamin D 1000 units Tab Take 1,000 mg by mouth once daily.       TWINRIX, PF, 720 Rosita unit -20 mcg/mL Syrg suspension       [DISCONTINUED] GLUCOSAMINE HCL/CHONDROITIN ROLLE (GLUCOSAMINE-CHONDROITIN ORAL) Take 1 tablet by mouth once daily.      [DISCONTINUED] topiramate (TROKENDI XR) 50 mg Cp24 Take 50 mg by mouth once daily. 30 capsule 3     No current facility-administered medications on file prior to visit.          Objective Findings:    PHYSICAL EXAM:   Friendly White female, in no acute distress; alert and oriented to person, place and time  VITALS: /75 (BP Location: Right arm, Patient Position: Sitting, BP Method: Medium (Automatic))   Pulse 94   Temp 97.8 °F (36.6 °C) (Oral)   Resp 18   Ht 5' 2" (1.575 m)   Wt 85.4 kg (188 lb 4.4 oz)   LMP 01/17/2012   SpO2 95%   BMI 34.44 kg/m²   HENT: Normocephalic, without obvious abnormality. Oral mucosa pink and moist. Dentition good.  EYES: Sclerae anicteric.    NECK: Supple.  CARDIOVASCULAR: Regular rate and rhythm. No murmurs.  RESPIRATORY: Normal respiratory effort. BBS CTA. No wheezes or crackles.  GI: Soft, non-tender, non-distended. No hepatosplenomegaly. No masses palpable. No ascites.  EXTREMITIES:  No clubbing, cyanosis or edema.  SKIN: Warm and dry. No jaundice. No rashes noted to exposed skin. + few telangectasias noted to chest. No palmar erythema.  NEURO:  Normal gate. No asterixis.  PSYCH:  Memory intact. Thought and speech pattern appropriate. Behavior normal. No depression or anxiety noted.      Labs:  Lab Results   Component Value Date    WBC 5.21 10/06/2018    WBC 5.21 10/06/2018    HGB 14.0 10/06/2018    HGB 14.0 10/06/2018    HCT 41.5 10/06/2018    HCT 41.5 10/06/2018     " 10/06/2018     10/06/2018    CHOL 227 (H) 10/06/2018    TRIG 196 (H) 10/06/2018    HDL 50 10/06/2018     10/06/2018     10/06/2018     10/06/2018    K 4.2 10/06/2018    K 4.2 10/06/2018    K 4.5 10/06/2018    CREATININE 0.8 10/06/2018    CREATININE 0.8 10/06/2018    CREATININE 0.8 10/06/2018    ALT 26 10/06/2018    ALT 26 10/06/2018    AST 20 10/06/2018    AST 20 10/06/2018    ALKPHOS 59 10/06/2018    ALKPHOS 59 10/06/2018    BILITOT 0.4 10/06/2018    BILITOT 0.4 10/06/2018    ALBUMIN 3.9 10/06/2018    ALBUMIN 3.9 10/06/2018    INR 0.9 11/29/2017       ASSESSMENT:  53 y.o. White female with:  1.  Hereditary hemochromatosis  -- normal liver enzymes  -- homozygote for C282Y  -- s/p 2 phlebotomies (2/2018, 3/2018), ferritin improved from 624 before phlebotomy   -- last ferritin 468. Pt denies any regular alcohol intake currently  -- Fibroscan 11/2017 = F0-F1, no fibrosis  -- liver biopsy 12/2017 - Mild steatosis, macrovesicular 20% and microvesicular 10%. Mild to moderate siderosis, 2+, within hepatocytes. No fibrosis  -- s/p 2/3 TwinRix vaccines completed, last vaccine overdue    2. Elevated ferritin, could be from iron overload but her alcohol use may be contributing as well  -- continue to decrease alcohol intake    3. Fatty liver disease   -- transaminases normal  -- US done 11/2017 shows fatty liver or hemochromatosis  -- synthetic liver function nl  -- risk factors for fatty liver include obesity, HLD, and her alcohol use may be contributing   -- liver biopsy -  Mild steatosis, macrovesicular 20% and microvesicular 10%. Mild to moderate siderosis, 2+, within hepatocytes. No fibrosis    4. Alcohol use  --  H/o 6 beers at a time, at least twice weekly   -- she has minimized this       EDUCATION:   Reassured pt no fibrosis on biopsy and only mild to moderate iron.     Avoid drinking daily given risk of worsening fatty liver disease.     We discussed the manifestations of NAFLD. At this time  there is no FDA approved therapy for NAFLD.   The patient and I discussed the importance of diet, exercise, and weight loss for management of NAFLD. We discussed a low fat, low carb/low sugar, high fiber diet, and a goal of 30 minutes of exercise 5 times per week.   Pt is aware that fatty liver can progress to steatohepatitis and possibly to cirrhosis, putting one at increased risk for liver cancer, liver failure, and death.        PLAN:  1. Last Twinrix vaccine due now  2. Continue to minimize alcohol intake, avoid drinking daily  3. Phlebotomy Q 4 weeks x next 3 months   4. Repeat labs Q 4 weeks with phlebotomy (CBC, CMP, ferritin)  5. Weight loss with diet and exercise  6. F/u in 6 months     Thank you for allowing me to participate in the care of Rachel Santos, SADA-C

## 2018-10-09 NOTE — Clinical Note
Please coordinate:1. Phlebotomy Q 4 weeks x next 3 months 2. Repeat labs in 11/2018 and 12/2018 with phlebotomy (CBC, CMP, ferritin)

## 2018-10-09 NOTE — LETTER
October 16, 2018      Mahad Stern MD  [address] [facility]  [address] [place] [address]           [facility]  [address]  [facility] [address]  Phone: [phone]          Patient: Rachel Leal   MR Number: [id number]   YOB: 1965   Date of Visit: 10/9/2018       Dear Dr. Mahad Stern:    Thank you for referring Rachel Leal to me for evaluation. Attached you will find relevant portions of my assessment and plan of care.    If you have questions, please do not hesitate to call me. I look forward to following Rachel Leal along with you.    Sincerely,    Yunior Starks MD    Enclosure  CC:  Cata Leal MD    If you would like to receive this communication electronically, please contact [email or web address] or [phone] to request more information on [employer] access.    For providers and/or their staff who would like to refer a patient to [facility], please contact us through our one-stop-shop provider referral line, [facility], at [phone].    If you feel you have received this communication in error or would no longer like to receive these types of communications, please e-mail [email or web address]

## 2018-10-10 ENCOUNTER — TELEPHONE (OUTPATIENT)
Dept: HEPATOLOGY | Facility: CLINIC | Age: 53
End: 2018-10-10

## 2018-10-10 NOTE — TELEPHONE ENCOUNTER
Patient needs to have Phlebotomies done monthly x 3 with Labs (CBC, CMP, Ferritin) prior to Phlebotomies for Nov and Dec.  The patient would like to have it done locally.    2nd call placed to Zeenat with [facility].  Message left on voice mail about the appt needed for the patient with the MRN #, my direct telephone #.    # [phone], Fax # [phone].  Main telephone # [phone].    Orders faxed to [facility] for Phlebotomy and Labs.  Receipt of confirmation received.

## 2018-10-12 ENCOUNTER — TELEPHONE (OUTPATIENT)
Dept: HEPATOLOGY | Facility: CLINIC | Age: 53
End: 2018-10-12

## 2018-10-12 NOTE — TELEPHONE ENCOUNTER
Patient needs to have Phlebotomies done monthly x 3 with Labs (CBC, CMP, Ferritin) prior to Phlebotomies for Oct, Nov and Dec.  The patient would like to have it done locally.     4th call placed to Zeenat with [facility]. Called on yesterday 10/11/18 also.  Message left on voice mail about the appt needed for the patient with the MRN #, the telephone # for the patient and my direct telephone #.      # [phone], Fax # [phone].  Main telephone # [phone].  Jaimeskj Foley.

## 2018-10-16 ENCOUNTER — HOSPITAL ENCOUNTER (OUTPATIENT)
Dept: PREADMISSION TESTING | Facility: HOSPITAL | Age: 53
Discharge: HOME | End: 2018-10-16
Attending: COLON & RECTAL SURGERY
Payer: COMMERCIAL

## 2018-10-16 NOTE — PROGRESS NOTES
Subjective:       Patient ID: Rachel Leal is a 53 y.o. female.    Chief Complaint: Anal Warts    HPI   54 yo F - underwent colonoscopy on 2018 for screening due to a family history of colon cancer in her mother.  She was found to have 2 rectal polyps, 1 of which was in the proximal rectum and was found to be a hyperplastic polyp on pathology, the other of which was in the distal rectum and pathology revealed condyloma acuminatum.  She presents for further evaluation.  She offers no complaints.  She has no anal bleeding or pain with bowel movements.  She has no trouble with constipation or straining.  She has had no weight loss or other constitutional symptoms.  She had a hysterectomy many years ago but did not have history of abnormal Pap smears prior to that.  She has never had an HIV test.  She does have a history of breast cancer currently in remission as well as hereditary hemochromatosis.    + family hx of CRC - Mother      Review of patient's allergies indicates:   Allergen Reactions    Neupro [rotigotine] Dermatitis       Past Medical History:   Diagnosis Date    Cancer     breast stage iv, remission     Hereditary hemochromatosis 2015    History of TMJ syndrome     Hyperlipidemia     Hypertension     Hypoglycemia     Parkinson disease        Past Surgical History:   Procedure Laterality Date    BIOPSY LIVER AND ULTRASOUND N/A 2017    Performed by [facility] OR [address] [place] [address]    BREAST SURGERY      augmentation     SECTION      COLONOSCOPY      EYE SURGERY      HAND SURGERY      HYSTERECTOMY      TL BSO    LIVER BIOPSY  2017    Mild steatosis, macrovesicular 20% and microvesicular 10%. Mild to moderate siderosis, 2+, within hepatocytes. No fibrosis    TONSILLECTOMY         Current Outpatient Medications   Medication Sig Dispense Refill    ALPRAZolam (XANAX) 0.5 MG tablet Take 1 tablet (0.5 mg total) by mouth nightly as needed. 30  tablet 0    anastrozole (ARIMIDEX) 1 mg Tab Take 1 mg by mouth once daily.      ascorbic acid, vitamin C, (VITAMIN C) 1000 MG tablet Take 1,000 mg by mouth once daily.      carbidopa-levodopa  mg (SINEMET)  mg per tablet TAKE 1 TABLET BY MOUTH 2 (TWO) TIMES DAILY. 180 tablet 3    COCONUT OIL ORAL Take 2 capsules by mouth once daily.      denosumab (XGEVA) 120 mg/1.7 mL (70 mg/mL) Soln Inject 120 mg into the skin. Every 3 months      esomeprazole (NEXIUM) 20 MG capsule Take 1 capsule (20 mg total) by mouth as needed. 30 capsule 0    losartan-hydrochlorothiazide 50-12.5 mg (HYZAAR) 50-12.5 mg per tablet Take 1 tablet by mouth once daily.  11    pravastatin (PRAVACHOL) 40 MG tablet Take 1 tablet (40 mg total) by mouth once daily. 90 tablet 3    TWINRIX, PF, 720 Rosita unit -20 mcg/mL Syrg suspension       venlafaxine (EFFEXOR-XR) 75 MG 24 hr capsule Take 75 mg by mouth once daily.       vitamin D 1000 units Tab Take 1,000 mg by mouth once daily.        No current facility-administered medications for this visit.        Family History   Problem Relation Age of Onset    Colon cancer Mother     Diabetes Father     Heart disease Father     Hemochromatosis Brother     Cirrhosis Brother         hemochromotosis    Breast cancer Neg Hx     Ovarian cancer Neg Hx        Social History     Socioeconomic History    Marital status:      Spouse name: None    Number of children: None    Years of education: None    Highest education level: None   Social Needs    Financial resource strain: None    Food insecurity - worry: None    Food insecurity - inability: None    Transportation needs - medical: None    Transportation needs - non-medical: None   Occupational History    None   Tobacco Use    Smoking status: Former Smoker     Packs/day: 0.25     Types: Cigarettes     Last attempt to quit: 2013     Years since quittin.0    Smokeless tobacco: Never Used   Substance and Sexual  Activity    Alcohol use: Yes     Alcohol/week: 6.0 oz     Types: 12 Standard drinks or equivalent per week     Comment: beer, 6 pack twice weekly    Drug use: No    Sexual activity: Yes     Partners: Male     Birth control/protection: Surgical     Comment: .   Other Topics Concern    None   Social History Narrative    None       Review of Systems   Constitutional: Negative for chills and fever.   HENT: Negative for congestion and sore throat.    Eyes: Negative for visual disturbance.   Respiratory: Negative for cough and shortness of breath.    Cardiovascular: Negative for chest pain and palpitations.   Gastrointestinal: Negative for abdominal distention, abdominal pain, anal bleeding, blood in stool, constipation, diarrhea, nausea, rectal pain and vomiting.   Endocrine: Negative for cold intolerance and heat intolerance.   Genitourinary: Negative for dysuria and frequency.   Musculoskeletal: Negative for arthralgias, back pain and neck pain.   Skin: Negative for rash.   Allergic/Immunologic: Negative for immunocompromised state.   Neurological: Negative for dizziness, light-headedness and headaches.   Hematological: Does not bruise/bleed easily.   Psychiatric/Behavioral: Negative for confusion. The patient is not nervous/anxious.        Objective:      Physical Exam   Constitutional: She is oriented to person, place, and time. She appears well-developed and well-nourished.   HENT:   Head: Normocephalic.   Pulmonary/Chest: Effort normal. No respiratory distress.   Abdominal: Soft. Bowel sounds are normal. She exhibits no distension and no mass. There is no tenderness. There is no rebound and no guarding.   Musculoskeletal: Normal range of motion.   Neurological: She is alert and oriented to person, place, and time.   Skin: Skin is warm and dry.   Psychiatric: She has a normal mood and affect.         Lab Results   Component Value Date    WBC 5.21 10/06/2018    WBC 5.21 10/06/2018    HGB 14.0 10/06/2018     HGB 14.0 10/06/2018    HCT 41.5 10/06/2018    HCT 41.5 10/06/2018    MCV 96 10/06/2018    MCV 96 10/06/2018     10/06/2018     10/06/2018     BMP  Lab Results   Component Value Date     10/06/2018     10/06/2018     10/06/2018    K 4.2 10/06/2018    K 4.2 10/06/2018    K 4.5 10/06/2018     10/06/2018     10/06/2018     10/06/2018    CO2 24 10/06/2018    CO2 24 10/06/2018    CO2 25 10/06/2018    BUN 14 10/06/2018    BUN 14 10/06/2018    BUN 14 10/06/2018    CREATININE 0.8 10/06/2018    CREATININE 0.8 10/06/2018    CREATININE 0.8 10/06/2018    CALCIUM 9.6 10/06/2018    CALCIUM 9.6 10/06/2018    CALCIUM 9.6 10/06/2018    ANIONGAP 11 10/06/2018    ANIONGAP 11 10/06/2018    ANIONGAP 11 10/06/2018    ESTGFRAFRICA >60 10/06/2018    ESTGFRAFRICA >60 10/06/2018    ESTGFRAFRICA >60 10/06/2018    EGFRNONAA >60 10/06/2018    EGFRNONAA >60 10/06/2018    EGFRNONAA >60 10/06/2018     CMP  Sodium   Date Value Ref Range Status   10/06/2018 141 136 - 145 mmol/L Final   10/06/2018 141 136 - 145 mmol/L Final   10/06/2018 142 136 - 145 mmol/L Final     Potassium   Date Value Ref Range Status   10/06/2018 4.2 3.5 - 5.1 mmol/L Final   10/06/2018 4.2 3.5 - 5.1 mmol/L Final   10/06/2018 4.5 3.5 - 5.1 mmol/L Final     Chloride   Date Value Ref Range Status   10/06/2018 106 95 - 110 mmol/L Final   10/06/2018 106 95 - 110 mmol/L Final   10/06/2018 106 95 - 110 mmol/L Final     CO2   Date Value Ref Range Status   10/06/2018 24 23 - 29 mmol/L Final   10/06/2018 24 23 - 29 mmol/L Final   10/06/2018 25 23 - 29 mmol/L Final     Glucose   Date Value Ref Range Status   10/06/2018 96 70 - 110 mg/dL Final   10/06/2018 96 70 - 110 mg/dL Final   10/06/2018 96 70 - 110 mg/dL Final     BUN, Bld   Date Value Ref Range Status   10/06/2018 14 6 - 20 mg/dL Final   10/06/2018 14 6 - 20 mg/dL Final   10/06/2018 14 6 - 20 mg/dL Final     Creatinine   Date Value Ref Range Status   10/06/2018 0.8 0.5 - 1.4 mg/dL  Final   10/06/2018 0.8 0.5 - 1.4 mg/dL Final   10/06/2018 0.8 0.5 - 1.4 mg/dL Final     Calcium   Date Value Ref Range Status   10/06/2018 9.6 8.7 - 10.5 mg/dL Final   10/06/2018 9.6 8.7 - 10.5 mg/dL Final   10/06/2018 9.6 8.7 - 10.5 mg/dL Final     Total Protein   Date Value Ref Range Status   10/06/2018 7.5 6.0 - 8.4 g/dL Final   10/06/2018 7.5 6.0 - 8.4 g/dL Final     Albumin   Date Value Ref Range Status   10/06/2018 3.9 3.5 - 5.2 g/dL Final   10/06/2018 3.9 3.5 - 5.2 g/dL Final     Total Bilirubin   Date Value Ref Range Status   10/06/2018 0.4 0.1 - 1.0 mg/dL Final     Comment:     For infants and newborns, interpretation of results should be based  on gestational age, weight and in agreement with clinical  observations.  Premature Infant recommended reference ranges:  Up to 24 hours.............<8.0 mg/dL  Up to 48 hours............<12.0 mg/dL  3-5 days..................<15.0 mg/dL  6-29 days.................<15.0 mg/dL     10/06/2018 0.4 0.1 - 1.0 mg/dL Final     Comment:     For infants and newborns, interpretation of results should be based  on gestational age, weight and in agreement with clinical  observations.  Premature Infant recommended reference ranges:  Up to 24 hours.............<8.0 mg/dL  Up to 48 hours............<12.0 mg/dL  3-5 days..................<15.0 mg/dL  6-29 days.................<15.0 mg/dL       Alkaline Phosphatase   Date Value Ref Range Status   10/06/2018 59 55 - 135 U/L Final   10/06/2018 59 55 - 135 U/L Final     AST   Date Value Ref Range Status   10/06/2018 20 10 - 40 U/L Final   10/06/2018 20 10 - 40 U/L Final     ALT   Date Value Ref Range Status   10/06/2018 26 10 - 44 U/L Final   10/06/2018 26 10 - 44 U/L Final     Anion Gap   Date Value Ref Range Status   10/06/2018 11 8 - 16 mmol/L Final   10/06/2018 11 8 - 16 mmol/L Final   10/06/2018 11 8 - 16 mmol/L Final     eGFR if    Date Value Ref Range Status   10/06/2018 >60 >60 mL/min/1.73 m^2 Final   10/06/2018  >60 >60 mL/min/1.73 m^2 Final   10/06/2018 >60 >60 mL/min/1.73 m^2 Final     eGFR if non    Date Value Ref Range Status   10/06/2018 >60 >60 mL/min/1.73 m^2 Final     Comment:     Calculation used to obtain the estimated glomerular filtration  rate (eGFR) is the CKD-EPI equation.      10/06/2018 >60 >60 mL/min/1.73 m^2 Final     Comment:     Calculation used to obtain the estimated glomerular filtration  rate (eGFR) is the CKD-EPI equation.      10/06/2018 >60 >60 mL/min/1.73 m^2 Final     Comment:     Calculation used to obtain the estimated glomerular filtration  rate (eGFR) is the CKD-EPI equation.        No results found for: CEA        Assessment:       1. Anal condyloma        Plan:   Scheduled for the OR on 10/25/2018 at [facility] for an examination under anesthesia and excision/fulgaratiuon of any residual anal condyloma.    I have discussed the procedure at length with Rachel Leal.  We discussed the rationale, risks, benefits, and alternatives in depth.  We discussed the expected outcomes and potential complications including but not limited to bleeding, infection, recurrence, prolonged pain, need for further procedures and altered continence.  She verbalized her understanding of the procedure and wishes to proceed.  Written consent was obtained.    Yunior Starks MD, FACS, FASCRS  Senior Staff Surgeon  Department of Colon & Rectal Surgery

## 2018-10-16 NOTE — DISCHARGE INSTRUCTIONS
[facility]  Pre Admit Instructions    Day and Date of Procedure: Thursday 10-25-18        · Call your doctor if you become ill before your surgery  · Someone will call you between 1 p.m. And 5 p.m.the workday before the procedure to give you an arrival time       - Before 7 a.m. Enter through Emergency Room       - 7 a.m. To 5 p.m. Enter through Patient Registration Main Lobby  · You must have a responsible  to bring you home    Do NOT eat or drink anything   past midnight before your procedure day    Please    · Do not wear makeup, jewelry, nail polish or body piercings  · Bring containers/solution for contacts, dentures, bridges - these and hearing aids will be removed before your procedure  · Do not bring cash, jewelry or valuables the day of your procedure   · No smoking at least 24 hours before your procedure  · Wear clothing that is comfortable and easy to take off and put on  · Do NOT shave for at least 5 days before your surgery    Review skin preparation handout before using. Shower with Hibiclens the Night before the procedure. Bring remaining Hibiclens with you the morning of surgery.                Information about your stay (Please Review)    Before Surgery  1. Cafeteria Meals: 7am to 10am; 11am to 1:30 pm; Dinner/Supper must may be ordered between 11:00 am and 4 pm from the [facility] Cafe After [employer] Menu. Food will be available to  between 5 pm and 6 pm. The kitchen phone extension is [phone].  2. Your doctor may order and review labs, x-rays, ECG or other tests as a pre-surgery workup and will call you if there is need for follow up.  3. No smoking inside or outside the hospital on hospital grounds.  4. Wear clothing that is easy to take off and put on.  The hospital will provide you with a gown.  5. You may bring robe, slippers, nightwear, and toiletries (toothbrush, toothpaste, makeup).  6. If your doctor orders a Fleets Enema or other prep, follow package and/or doctors  orders.  7. Brush your teeth and rinse your mouth the morning of surgery, but dont swallow the water.  8. The nurse will ask questions and check your condition.  The doctor may tom your surgical site.  9. Compression boots may be put on your calves to reduce the risk of blood clots.  10. The doctor may order medicine to help you relax before surgery.  After Surgery  1. The nurse will check your temperature, breathing, blood pressure, heart rate, IV site, and surgery site.  2. A diet will be ordered-most start with ice chips and then advance slowly to other foods.  3. If you have IV fluids the IV pump will beep to let the nurse know that she needs to check it.  4. You may have a urinary catheter and staff may measure your oral intake and urine output.  5. Pain medication may be ordered by the doctor after surgery.  If you have a pain management device tell your caretakers not to press the button because of OVERDOSE RISK.  6. When the nurse or doctor tells you it is okay to get out of bed, ask for help until you are stable.  7. The nurse may ask you to turn, cough, and deep breathe to prevent lung problems.  You can use a pillow to hold your incision when you deep breathe or cough to reduce pain.  8. The nurse will give you discharge instructions--incision care, symptoms to report to your doctor, and your follow-up appointment when you are discharged.  You cannot drive yourself home.  Goal for Discharge from One Day Surgery  · Control pain with an oral medication  · Walk without feeling dizzy or weak  · Tolerate liquids well  · Urinate without difficulty    Things you can do to  Reduce the Risk of Infections or Complications  Wash Hands and use Waterless Hand Sanitizers  · Wash hands frequently with soap and warm water for at least 15 seconds.   · Use hand sanitizers (alcohol based) often at home and in public if hands are not visibly soiled  Take Antibiotic Exactly as Prescribed  · Do not stop antibiotics too soon;  you risk developing infection resistant to antibiotics  · Take your antibiotic even if you are feeling better and even if they upset your stomach  · Call the doctor if you cant tolerate the antibiotic or you have an allergic reaction  Stay Healthy  · Take medicines as prescribed by your doctor  · Keep your diabetes under control - diet and medication  · Get enough rest, exercise and eat a healthy diet  Keep the Wound Clean and Dry  · Wash hands before and after taking care of the incision (cut)  · Wash hands when you remove a dressing, before you touch/apply a new dressing  · Shower and clean incision with antibacterial soap and rinse well if the doctor approves  · Allow the cut to dry completely before putting on a clean dressing  · Do not touch the part of the bandage that will cover the incision  · Do not use ointments unless your doctor tells you to-can promote bacterial growth  · If ordered, put ointment directly on the dressing-do not touch the end of the tube  · Do not scrub, remove scabs, or leave a damp dressing on the incision  · Do not use peroxide or alcohol to clean the incision unless the doctor tells you to   · Do not let children, pets or anyone else contaminate the incision  Stop Smoking To Prevent Infection  · Stop smoking-Centers for Disease Control recommends 30 days before surgery  · Smokers get more infections after surgery-studies have shown 6 times the risk  · Smokers have more scarring and heal slower-open wounds get infected easier  Prevent Respiratory complications  · Stop smoking  · Turn, cough, and deep breathe even if you have some pain when you do so.  · Splint your incision with a pillow when you cough/deep breath, to help control pain.  · Do not lie in one position for long periods of time.   Prevent Blood Clots  · When you wake move your legs, flex your feet, rotate your ankles, wiggle your toes  · Get up when the doctor says its ok.  Dangle your feet from the side of the  bed  · Report symptoms-leg pain, redness/swelling, warm to touch; fever; shortness of breath, chest pain, severe upper back pain.

## 2018-10-19 ENCOUNTER — TELEPHONE (OUTPATIENT)
Dept: HEPATOLOGY | Facility: CLINIC | Age: 53
End: 2018-10-19

## 2018-10-19 NOTE — TELEPHONE ENCOUNTER
Message from Bernadette Santos NP.  Please coordinate:   1. Phlebotomy Q 4 weeks x next 3 months   2. Repeat labs in 11/2018 and 12/2018 with phlebotomy (CBC, CMP, ferritin).    Multiple attempts made to contact [facility] for an appointment.  [facility] # [phone], Blood Donor Room # [phone] and Fax # for Orders [phone].    Spoke with the  due to no one answering calls and orders faxed on 10/10/18.  The Blood Donor Dept is short staff. Zeenat does not work there any more. The Dept has sporadic days when they come in. Next time is unknown.    Patient called and informed that she will no be able to do her Phlebotomy at [facility]. I will have to set them up at [facility].  Patient stated she is willing to come here but is having a family emergency and will call back to set things up.

## 2018-10-22 ENCOUNTER — TELEPHONE (OUTPATIENT)
Dept: SURGERY | Facility: CLINIC | Age: 53
End: 2018-10-22

## 2018-10-22 NOTE — TELEPHONE ENCOUNTER
----- Message from Jory Roblero sent at 10/22/2018  8:14 AM CDT -----  Contact: pt#[phone]  Needs Advice    Reason for call:Pt wants to cancel surgery         Communication Preference:will call back to reschedule     Additional Information:

## 2018-10-22 NOTE — TELEPHONE ENCOUNTER
Spoke with pt and she said her mother fell this weekend and she has to take care of her so she needs to put her surgery on hold and she will call to reschedule.

## 2018-11-20 ENCOUNTER — TELEPHONE (OUTPATIENT)
Dept: SURGERY | Facility: CLINIC | Age: 53
End: 2018-11-20

## 2018-11-20 NOTE — TELEPHONE ENCOUNTER
Left message for patient to return my call.  Dr. Starks has Nov. 27 and Dec. 14 available for surgery on the patient.

## 2018-11-20 NOTE — TELEPHONE ENCOUNTER
----- Message from Jory Roblero sent at 11/20/2018  8:17 AM CST -----  Contact: pt [phone]  Needs Advice    Reason for call:Pt needs to reschedule surgery     Communication Preference:callback     Additional Information:

## 2018-11-20 NOTE — TELEPHONE ENCOUNTER
----- Message from Cassandra Salgado sent at 11/20/2018 11:01 AM CST -----  Contact: Self- [phone]  Tereza- pt states she is returning a missed call from Katie Muniz- please contact pt at [phone]

## 2018-11-21 ENCOUNTER — TELEPHONE (OUTPATIENT)
Dept: SURGERY | Facility: CLINIC | Age: 53
End: 2018-11-21

## 2018-11-21 NOTE — TELEPHONE ENCOUNTER
----- Message from Iris Sky sent at 11/21/2018 12:17 PM CST -----  Contact: Pt:[phone]  .Patient Returning Call from [facility]    Who Left Message for Patient:Katie LUU RN  Communication Preference:Pt:[phone]  Additional Information:

## 2018-11-23 ENCOUNTER — TELEPHONE (OUTPATIENT)
Dept: SURGERY | Facility: CLINIC | Age: 53
End: 2018-11-23

## 2018-11-23 NOTE — TELEPHONE ENCOUNTER
----- Message from Emely Hunter sent at 11/23/2018 11:30 AM CST -----  Contact: self [phone]  Patient Returning Call from [facility]    Who Left Message for Patient: Katie Muniz  Communication Preference: self [phone]  Additional Information:

## 2018-11-26 ENCOUNTER — OFFICE VISIT (OUTPATIENT)
Dept: INTERNAL MEDICINE | Facility: CLINIC | Age: 53
End: 2018-11-26
Payer: COMMERCIAL

## 2018-11-26 ENCOUNTER — IMMUNIZATION (OUTPATIENT)
Dept: PHARMACY | Facility: CLINIC | Age: 53
End: 2018-11-26
Payer: COMMERCIAL

## 2018-11-26 VITALS
BODY MASS INDEX: 35.41 KG/M2 | WEIGHT: 192.44 LBS | HEIGHT: 62 IN | HEART RATE: 74 BPM | RESPIRATION RATE: 16 BRPM | DIASTOLIC BLOOD PRESSURE: 82 MMHG | SYSTOLIC BLOOD PRESSURE: 130 MMHG | OXYGEN SATURATION: 98 %

## 2018-11-26 DIAGNOSIS — F41.9 ANXIETY: ICD-10-CM

## 2018-11-26 DIAGNOSIS — E66.9 OBESITY (BMI 30-39.9): ICD-10-CM

## 2018-11-26 DIAGNOSIS — E83.110 HEREDITARY HEMOCHROMATOSIS: Primary | ICD-10-CM

## 2018-11-26 DIAGNOSIS — K21.9 GASTROESOPHAGEAL REFLUX DISEASE, ESOPHAGITIS PRESENCE NOT SPECIFIED: ICD-10-CM

## 2018-11-26 DIAGNOSIS — G20.A1 PARKINSON DISEASE: ICD-10-CM

## 2018-11-26 DIAGNOSIS — E78.5 HYPERLIPIDEMIA, UNSPECIFIED HYPERLIPIDEMIA TYPE: ICD-10-CM

## 2018-11-26 DIAGNOSIS — R07.9 CHEST PAIN, UNSPECIFIED TYPE: ICD-10-CM

## 2018-11-26 DIAGNOSIS — Z85.3 HX OF BREAST CANCER: ICD-10-CM

## 2018-11-26 PROCEDURE — 99999 PR PBB SHADOW E&M-EST. PATIENT-LVL III: CPT | Mod: PBBFAC,,, | Performed by: INTERNAL MEDICINE

## 2018-11-26 PROCEDURE — 99214 OFFICE O/P EST MOD 30 MIN: CPT | Mod: S$GLB,,, | Performed by: INTERNAL MEDICINE

## 2018-11-26 PROCEDURE — 3008F BODY MASS INDEX DOCD: CPT | Mod: CPTII,S$GLB,, | Performed by: INTERNAL MEDICINE

## 2018-11-26 RX ORDER — ALPRAZOLAM 0.5 MG/1
0.5 TABLET ORAL NIGHTLY PRN
Qty: 30 TABLET | Refills: 0 | Status: SHIPPED | OUTPATIENT
Start: 2018-11-26 | End: 2019-06-17 | Stop reason: SDUPTHER

## 2018-11-26 NOTE — PROGRESS NOTES
Subjective:       Patient ID: Rachel Leal is a 53 y.o. female.    Chief Complaint: Follow-up      HPI:  Patient is known to me and presents for follow up DNA positive hemochromotosis, h/o breast cancer and anxiety.  Labs from 10/6/18 personally reviewed and discussed with the patient today.      Since I saw her last has issues with chest pain. Was at ENT and having laryngoscopy done and felt tightness in center of chest. Referred to Dr. Gallegos who did stress test 10/2018 which was nomrla. She has had two more episodes since then. + belching and some reflux. She only takes Nexium PRN. She does not notice sx are worse with eating. Sx are not exertional. No dysphagia/odynophagia     H/o breast cancer with metastasis to bone (stage VI at diagnosis): dx 2013. Follows with Dr. Gerardo--he is retiring but will establish with is replacement. On arimidex and xgeva; s/p left mastectomy. Doing CT scans with oncology, no MMG. she did follow with her oncologist recently. Did bone scan and no mets in the right hip.     Anxiety: Effexor and Xanax currently. Mood is well controlled. Denies depressed mood. Denies SI. Using Xanax very sparingly     HLD: was taking omega 3 OTCnad lipitor- both caused sdie effects (SOB and hair loss respectively). Repeat cholesterol off meds very elevated again;  so started pravastatin. LDL trended down and overall controlled. TG still a little elevated     Parkinson's: she is taking sinement. She was given neuopro but resulted in cellulitis that responded well to antibx given at urgent care. Stopped the patch and hasn't had any further reactions. Feels she is doing well on current treatment     Hemochromotosis: diagnosed with positive DNA analysis but no sympotms. Ferritin 400s. She does follow with hepatology. Last US 12/2015 showed fatty infiltration otherwise normal. Last AST/ALT normal, normal bilirubin. No history of blood sugar problems. Had recent bx with hepatology as well.    Past  Medical History:   Diagnosis Date    Cancer     breast stage iv, remission 2014    Hereditary hemochromatosis 2015    History of TMJ syndrome     Hyperlipidemia     Hypertension     Hypoglycemia     Parkinson disease        Family History   Problem Relation Age of Onset    Colon cancer Mother     Diabetes Father     Heart disease Father     Hemochromatosis Brother     Cirrhosis Brother         hemochromotosis    Breast cancer Neg Hx     Ovarian cancer Neg Hx        Social History     Socioeconomic History    Marital status:      Spouse name: Not on file    Number of children: Not on file    Years of education: Not on file    Highest education level: Not on file   Social Needs    Financial resource strain: Not on file    Food insecurity - worry: Not on file    Food insecurity - inability: Not on file    Transportation needs - medical: Not on file    Transportation needs - non-medical: Not on file   Occupational History    Not on file   Tobacco Use    Smoking status: Former Smoker     Packs/day: 0.25     Types: Cigarettes     Last attempt to quit: 2013     Years since quittin.2    Smokeless tobacco: Never Used   Substance and Sexual Activity    Alcohol use: Yes     Alcohol/week: 6.0 oz     Types: 12 Standard drinks or equivalent per week     Comment: beer, 6 pack twice weekly    Drug use: No    Sexual activity: Yes     Partners: Male     Birth control/protection: Surgical     Comment: .   Other Topics Concern    Not on file   Social History Narrative    Not on file       Review of Systems   Constitutional: Negative for activity change, fatigue, fever and unexpected weight change.   HENT: Negative for congestion, ear pain, hearing loss, rhinorrhea and sore throat.    Eyes: Negative for pain, redness and visual disturbance.   Respiratory: Negative for cough, shortness of breath and wheezing.    Cardiovascular: Negative for chest pain, palpitations and leg  swelling.   Gastrointestinal: Negative for abdominal pain, constipation, diarrhea, nausea and vomiting.   Genitourinary: Negative for dysuria, frequency and urgency.   Musculoskeletal: Negative for back pain, joint swelling and neck pain.   Skin: Negative for color change, rash and wound.   Neurological: Negative for dizziness, tremors, weakness, light-headedness and headaches.         Objective:      Physical Exam   Constitutional: She is oriented to person, place, and time. She appears well-developed and well-nourished. No distress.   HENT:   Head: Normocephalic and atraumatic.   Right Ear: External ear normal.   Left Ear: External ear normal.   Eyes: Conjunctivae and EOM are normal. Pupils are equal, round, and reactive to light.   Neck: Neck supple. No tracheal deviation present.   Cardiovascular: Normal rate and regular rhythm. Exam reveals no friction rub.   No murmur heard.  Pulmonary/Chest: Effort normal and breath sounds normal. No respiratory distress. She has no wheezes. She has no rales.   Abdominal: Soft. Bowel sounds are normal. She exhibits no distension. There is no tenderness.   Neurological: She is alert and oriented to person, place, and time. No cranial nerve deficit.   Skin: Skin is warm and dry.   Psychiatric: She has a normal mood and affect. Her behavior is normal.   Vitals reviewed.      Assessment:       1. Hereditary hemochromatosis    2. Anxiety    3. Hx of breast cancer    4. Hyperlipidemia, unspecified hyperlipidemia type    5. Parkinson disease    6. Obesity (BMI 30-39.9)    7. Chest pain, unspecified type    8. Gastroesophageal reflux disease, esophagitis presence not specified        Plan:       Rachel was seen today for follow-up.    Diagnoses and all orders for this visit:    Hereditary hemochromatosis  Follows with hepatology who is managing  Iron panel reviewed    Anxiety  -     ALPRAZolam (XANAX) 0.5 MG tablet; Take 1 tablet (0.5 mg total) by mouth nightly as needed.  Chronic  ocntrolled  Refilled Xanax, using PRN only  Cont effexor    Hx of breast cancer  Follows with oncology  CT for screening/surveillance    Hyperlipidemia, unspecified hyperlipidemia type  Chronic controlled  Cont meds at same dose    Parkinson disease  Chronic stable  Follows with neurology who is managing    Obesity (BMI 30-39.9)  Discussed diet and exercise    Chest pain, unspecified type  Gastroesophageal reflux disease, esophagitis presence not specified  New problem  Take Nexium daily for next 2-4 weeks, not PRN  See if that relieves sx  If still getting pain on PPI consider EGD  Sounds esophageal vs reflux related  Stress test 10/2018 negative with Dr. Gallegos      Health Maintenance:  -CRC: age 47 (mom and grandmother with CRC). Had polyps, unsure what kind. Done 8/2016  -PAP: s/p hysterectomy  -MMG: s/p left mastectomy, doing CT for screening  -tobacco: reports occasional use  -Vaccines: flu declines    RTC 6 months and PRN

## 2018-12-03 ENCOUNTER — HOSPITAL ENCOUNTER (OUTPATIENT)
Dept: TRANSFUSION MEDICINE | Facility: HOSPITAL | Age: 53
Discharge: HOME | End: 2018-12-03
Attending: NURSE PRACTITIONER
Payer: COMMERCIAL

## 2018-12-03 ENCOUNTER — TELEPHONE (OUTPATIENT)
Dept: HEPATOLOGY | Facility: CLINIC | Age: 53
End: 2018-12-03

## 2018-12-03 ENCOUNTER — LAB VISIT (OUTPATIENT)
Dept: LAB | Facility: HOSPITAL | Age: 53
End: 2018-12-03
Attending: NURSE PRACTITIONER
Payer: COMMERCIAL

## 2018-12-03 DIAGNOSIS — E83.110 HEREDITARY HEMOCHROMATOSIS: ICD-10-CM

## 2018-12-03 LAB
ALBUMIN SERPL BCP-MCNC: 3.6 G/DL
ALP SERPL-CCNC: 64 U/L
ALT SERPL W/O P-5'-P-CCNC: 5 U/L
ANION GAP SERPL CALC-SCNC: 7 MMOL/L
AST SERPL-CCNC: 21 U/L
BASOPHILS # BLD AUTO: 0.01 K/UL
BASOPHILS NFR BLD: 0.2 %
BILIRUB SERPL-MCNC: 0.3 MG/DL
BUN SERPL-MCNC: 16 MG/DL
CALCIUM SERPL-MCNC: 9.1 MG/DL
CHLORIDE SERPL-SCNC: 107 MMOL/L
CO2 SERPL-SCNC: 26 MMOL/L
CREAT SERPL-MCNC: 0.7 MG/DL
DIFFERENTIAL METHOD: ABNORMAL
EOSINOPHIL # BLD AUTO: 0.2 K/UL
EOSINOPHIL NFR BLD: 3 %
ERYTHROCYTE [DISTWIDTH] IN BLOOD BY AUTOMATED COUNT: 11.8 %
EST. GFR  (AFRICAN AMERICAN): >60 ML/MIN/1.73 M^2
EST. GFR  (NON AFRICAN AMERICAN): >60 ML/MIN/1.73 M^2
GLUCOSE SERPL-MCNC: 94 MG/DL
HCT VFR BLD AUTO: 37.4 %
HGB BLD-MCNC: 12.5 G/DL
LYMPHOCYTES # BLD AUTO: 1.1 K/UL
LYMPHOCYTES NFR BLD: 22.1 %
MCH RBC QN AUTO: 31.9 PG
MCHC RBC AUTO-ENTMCNC: 33.4 G/DL
MCV RBC AUTO: 95 FL
MONOCYTES # BLD AUTO: 0.6 K/UL
MONOCYTES NFR BLD: 11.6 %
NEUTROPHILS # BLD AUTO: 3.1 K/UL
NEUTROPHILS NFR BLD: 63.1 %
PLATELET # BLD AUTO: 175 K/UL
PMV BLD AUTO: 10.8 FL
POTASSIUM SERPL-SCNC: 4.2 MMOL/L
PROT SERPL-MCNC: 6.8 G/DL
RBC # BLD AUTO: 3.92 M/UL
SODIUM SERPL-SCNC: 140 MMOL/L
WBC # BLD AUTO: 4.98 K/UL

## 2018-12-03 PROCEDURE — 36415 COLL VENOUS BLD VENIPUNCTURE: CPT

## 2018-12-03 PROCEDURE — 82728 ASSAY OF FERRITIN: CPT

## 2018-12-03 PROCEDURE — 99195 PHLEBOTOMY: CPT

## 2018-12-03 PROCEDURE — 80053 COMPREHEN METABOLIC PANEL: CPT

## 2018-12-03 PROCEDURE — 85025 COMPLETE CBC W/AUTO DIFF WBC: CPT

## 2018-12-03 NOTE — TELEPHONE ENCOUNTER
----- Message from Lisseth Garcia sent at 12/3/2018  9:51 AM CST -----  Contact: Patient  Needs Advice    Reason for call: Pt calling to set up appts to have phlebotomy done        Communication Preference: [phone]    Additional Information: N/A

## 2018-12-04 ENCOUNTER — TELEPHONE (OUTPATIENT)
Dept: HEPATOLOGY | Facility: CLINIC | Age: 53
End: 2018-12-04

## 2018-12-04 LAB — FERRITIN SERPL-MCNC: 487 NG/ML

## 2018-12-04 NOTE — TELEPHONE ENCOUNTER
Spoke to pt, she just did first phlebotomy yesterday. Advised to continue phlebotomy Q4 weeks x next 2 months.      Please schedule lab for CBC, ferritin, CMP in 4 and 8 weeks to monitor so she can go and do phlebotomy those days.     Spoke w/pt & scheduled labs @ [place] on 01/02/2019 & Phlebotomy on 01/09/2019..................

## 2018-12-04 NOTE — TELEPHONE ENCOUNTER
Spoke to pt, she just did first phlebotomy yesterday. Advised to continue phlebotomy Q4 weeks x next 2 months.     Please schedule lab for CBC, ferritin, CMP in 4 and 8 weeks to monitor so she can go and do phlebotomy those days.

## 2018-12-14 ENCOUNTER — TELEPHONE (OUTPATIENT)
Dept: SURGERY | Facility: CLINIC | Age: 53
End: 2018-12-14

## 2018-12-14 NOTE — TELEPHONE ENCOUNTER
----- Message from Jory Roblero sent at 12/14/2018 12:59 PM CST -----  Contact: pt#[phone]  Needs Advice    Reason for call: Pt needs to reschedule pre op appt         Communication Preference:call  Additional Information:

## 2018-12-14 NOTE — TELEPHONE ENCOUNTER
Spoke with patient. She needed to reschedule appointment with Dr. Starks.  Appointment rescheduled on 1/17.

## 2019-01-09 DIAGNOSIS — E78.5 HYPERLIPIDEMIA, UNSPECIFIED HYPERLIPIDEMIA TYPE: ICD-10-CM

## 2019-01-14 ENCOUNTER — LAB VISIT (OUTPATIENT)
Dept: LAB | Facility: HOSPITAL | Age: 54
End: 2019-01-14
Attending: NURSE PRACTITIONER
Payer: COMMERCIAL

## 2019-01-14 DIAGNOSIS — E83.110 HEREDITARY HEMOCHROMATOSIS: ICD-10-CM

## 2019-01-14 LAB
ALBUMIN SERPL BCP-MCNC: 4 G/DL
ALP SERPL-CCNC: 75 U/L
ALT SERPL W/O P-5'-P-CCNC: 5 U/L
ANION GAP SERPL CALC-SCNC: 9 MMOL/L
AST SERPL-CCNC: 22 U/L
BASOPHILS # BLD AUTO: 0.02 K/UL
BASOPHILS NFR BLD: 0.4 %
BILIRUB SERPL-MCNC: 0.4 MG/DL
BUN SERPL-MCNC: 17 MG/DL
CALCIUM SERPL-MCNC: 9.3 MG/DL
CHLORIDE SERPL-SCNC: 102 MMOL/L
CO2 SERPL-SCNC: 28 MMOL/L
CREAT SERPL-MCNC: 0.8 MG/DL
DIFFERENTIAL METHOD: ABNORMAL
EOSINOPHIL # BLD AUTO: 0.1 K/UL
EOSINOPHIL NFR BLD: 1.6 %
ERYTHROCYTE [DISTWIDTH] IN BLOOD BY AUTOMATED COUNT: 11.8 %
EST. GFR  (AFRICAN AMERICAN): >60 ML/MIN/1.73 M^2
EST. GFR  (NON AFRICAN AMERICAN): >60 ML/MIN/1.73 M^2
FERRITIN SERPL-MCNC: 420 NG/ML
GLUCOSE SERPL-MCNC: 94 MG/DL
HCT VFR BLD AUTO: 42 %
HGB BLD-MCNC: 13.9 G/DL
LYMPHOCYTES # BLD AUTO: 1.2 K/UL
LYMPHOCYTES NFR BLD: 23.5 %
MCH RBC QN AUTO: 32.2 PG
MCHC RBC AUTO-ENTMCNC: 33.1 G/DL
MCV RBC AUTO: 97 FL
MONOCYTES # BLD AUTO: 0.5 K/UL
MONOCYTES NFR BLD: 9.1 %
NEUTROPHILS # BLD AUTO: 3.2 K/UL
NEUTROPHILS NFR BLD: 65.4 %
PLATELET # BLD AUTO: 188 K/UL
PMV BLD AUTO: 10.2 FL
POTASSIUM SERPL-SCNC: 4 MMOL/L
PROT SERPL-MCNC: 7.8 G/DL
RBC # BLD AUTO: 4.32 M/UL
SODIUM SERPL-SCNC: 139 MMOL/L
WBC # BLD AUTO: 4.94 K/UL

## 2019-01-14 PROCEDURE — 82728 ASSAY OF FERRITIN: CPT

## 2019-01-14 PROCEDURE — 36415 COLL VENOUS BLD VENIPUNCTURE: CPT

## 2019-01-14 PROCEDURE — 80053 COMPREHEN METABOLIC PANEL: CPT

## 2019-01-14 PROCEDURE — 85025 COMPLETE CBC W/AUTO DIFF WBC: CPT

## 2019-01-14 RX ORDER — PRAVASTATIN SODIUM 40 MG/1
40 TABLET ORAL DAILY
Qty: 90 TABLET | Refills: 3 | Status: SHIPPED | OUTPATIENT
Start: 2019-01-14 | End: 2019-12-20

## 2019-01-15 ENCOUNTER — TELEPHONE (OUTPATIENT)
Dept: HEPATOLOGY | Facility: CLINIC | Age: 54
End: 2019-01-15

## 2019-01-15 NOTE — TELEPHONE ENCOUNTER
Please advise pt that ferritin remains elevated. Continue phlebotomy Q 4 weeks for now x 4. Please ensure these are arranged.     Schedule f/u appt with me in 4/2019.

## 2019-01-16 NOTE — TELEPHONE ENCOUNTER
MA called patient inform her of her results below and that she need to continue her phlebotomy every 4 weeks x 4 she understood and stated that she usually go to [facility] and she will give them a call to schedule it.     Schedule patient follow up visit on April. Mailed appt reminder to patient.

## 2019-01-17 ENCOUNTER — OFFICE VISIT (OUTPATIENT)
Dept: SURGERY | Facility: CLINIC | Age: 54
End: 2019-01-17
Payer: COMMERCIAL

## 2019-01-17 VITALS
BODY MASS INDEX: 35.86 KG/M2 | SYSTOLIC BLOOD PRESSURE: 117 MMHG | WEIGHT: 194.88 LBS | HEIGHT: 62 IN | DIASTOLIC BLOOD PRESSURE: 72 MMHG | HEART RATE: 82 BPM

## 2019-01-17 DIAGNOSIS — A63.0 ANAL CONDYLOMA: Primary | ICD-10-CM

## 2019-01-17 PROCEDURE — 3008F BODY MASS INDEX DOCD: CPT | Mod: CPTII,S$GLB,, | Performed by: COLON & RECTAL SURGERY

## 2019-01-17 PROCEDURE — 3008F PR BODY MASS INDEX (BMI) DOCUMENTED: ICD-10-PCS | Mod: CPTII,S$GLB,, | Performed by: COLON & RECTAL SURGERY

## 2019-01-17 PROCEDURE — 99999 PR PBB SHADOW E&M-EST. PATIENT-LVL III: CPT | Mod: PBBFAC,,, | Performed by: COLON & RECTAL SURGERY

## 2019-01-17 PROCEDURE — 99999 PR PBB SHADOW E&M-EST. PATIENT-LVL III: ICD-10-PCS | Mod: PBBFAC,,, | Performed by: COLON & RECTAL SURGERY

## 2019-01-17 PROCEDURE — 99212 OFFICE O/P EST SF 10 MIN: CPT | Mod: S$GLB,,, | Performed by: COLON & RECTAL SURGERY

## 2019-01-17 PROCEDURE — 99212 PR OFFICE/OUTPT VISIT, EST, LEVL II, 10-19 MIN: ICD-10-PCS | Mod: S$GLB,,, | Performed by: COLON & RECTAL SURGERY

## 2019-01-27 ENCOUNTER — ANESTHESIA EVENT (OUTPATIENT)
Dept: SURGERY | Facility: HOSPITAL | Age: 54
End: 2019-01-27
Payer: COMMERCIAL

## 2019-01-28 ENCOUNTER — TELEPHONE (OUTPATIENT)
Dept: PREADMISSION TESTING | Facility: HOSPITAL | Age: 54
End: 2019-01-28

## 2019-01-28 ENCOUNTER — HOSPITAL ENCOUNTER (OUTPATIENT)
Dept: PREADMISSION TESTING | Facility: HOSPITAL | Age: 54
Discharge: HOME | End: 2019-01-28
Attending: COLON & RECTAL SURGERY
Payer: COMMERCIAL

## 2019-01-28 VITALS — BODY MASS INDEX: 34.96 KG/M2 | WEIGHT: 190 LBS | HEIGHT: 62 IN

## 2019-01-28 NOTE — TELEPHONE ENCOUNTER
Pt will be having condyloma excision on 1-31-19. Is this ok from an hematology standpoint. Any special precautions?

## 2019-01-28 NOTE — DISCHARGE INSTRUCTIONS
[facility]  Pre Admit Instructions    Day and Date of Procedure: Thursday 1-31-19  Arrival time: 9am      · Call your doctor if you become ill before your surgery  · Someone will call you between 1 p.m. And 5 p.m.the workday before the procedure to give you an arrival time       - 7 a.m. To 5 p.m. Enter through Patient Registration Main Lobby  · You must have a responsible  to bring you home    Do NOT eat or drink anything   past midnight before your procedure day    Please    · Do not wear makeup, jewelry, nail polish or body piercings  · Bring containers/solution for contacts, dentures, bridges - these and hearing aids will be removed before your procedure  · Do not bring cash, jewelry or valuables the day of your procedure   · No smoking at least 24 hours before your procedure  · Wear clothing that is comfortable and easy to take off and put on  · Do NOT shave for at least 5 days before your surgery    Review skin preparation handout before using. Shower with Hibiclens the Night before the procedure. Bring remaining Hibiclens with you the morning of surgery.                Information about your stay (Please Review)    Before Surgery  1. Cafeteria Meals: 7am to 10am; 11am to 1:30 pm; Dinner/Supper must may be ordered between 11:00 am and 4 pm from the [facility] Cafe After [employer] Menu. Food will be available to  between 5 pm and 6 pm. The kitchen phone extension is [phone].  2. Your doctor may order and review labs, x-rays, ECG or other tests as a pre-surgery workup and will call you if there is need for follow up.  3. No smoking inside or outside the hospital on hospital grounds.  4. Wear clothing that is easy to take off and put on.  The hospital will provide you with a gown.  5. You may bring robe, slippers, nightwear, and toiletries (toothbrush, toothpaste, makeup).  6. If your doctor orders a Fleets Enema or other prep, follow package and/or doctors orders.  7. Brush your teeth and rinse  your mouth the morning of surgery, but dont swallow the water.  8. The nurse will ask questions and check your condition.  The doctor may tom your surgical site.  9. Compression boots may be put on your calves to reduce the risk of blood clots.  10. The doctor may order medicine to help you relax before surgery.  After Surgery  1. The nurse will check your temperature, breathing, blood pressure, heart rate, IV site, and surgery site.  2. A diet will be ordered-most start with ice chips and then advance slowly to other foods.  3. If you have IV fluids the IV pump will beep to let the nurse know that she needs to check it.  4. You may have a urinary catheter and staff may measure your oral intake and urine output.  5. Pain medication may be ordered by the doctor after surgery.  If you have a pain management device tell your caretakers not to press the button because of OVERDOSE RISK.  6. When the nurse or doctor tells you it is okay to get out of bed, ask for help until you are stable.  7. The nurse may ask you to turn, cough, and deep breathe to prevent lung problems.  You can use a pillow to hold your incision when you deep breathe or cough to reduce pain.  8. The nurse will give you discharge instructions--incision care, symptoms to report to your doctor, and your follow-up appointment when you are discharged.  You cannot drive yourself home.  Goal for Discharge from One Day Surgery  · Control pain with an oral medication  · Walk without feeling dizzy or weak  · Tolerate liquids well  · Urinate without difficulty    Things you can do to  Reduce the Risk of Infections or Complications  Wash Hands and use Waterless Hand Sanitizers  · Wash hands frequently with soap and warm water for at least 15 seconds.   · Use hand sanitizers (alcohol based) often at home and in public if hands are not visibly soiled  Take Antibiotic Exactly as Prescribed  · Do not stop antibiotics too soon; you risk developing infection  resistant to antibiotics  · Take your antibiotic even if you are feeling better and even if they upset your stomach  · Call the doctor if you cant tolerate the antibiotic or you have an allergic reaction  Stay Healthy  · Take medicines as prescribed by your doctor  · Keep your diabetes under control - diet and medication  · Get enough rest, exercise and eat a healthy diet  Keep the Wound Clean and Dry  · Wash hands before and after taking care of the incision (cut)  · Wash hands when you remove a dressing, before you touch/apply a new dressing  · Shower and clean incision with antibacterial soap and rinse well if the doctor approves  · Allow the cut to dry completely before putting on a clean dressing  · Do not touch the part of the bandage that will cover the incision  · Do not use ointments unless your doctor tells you to-can promote bacterial growth  · If ordered, put ointment directly on the dressing-do not touch the end of the tube  · Do not scrub, remove scabs, or leave a damp dressing on the incision  · Do not use peroxide or alcohol to clean the incision unless the doctor tells you to   · Do not let children, pets or anyone else contaminate the incision  Stop Smoking To Prevent Infection  · Stop smoking-Centers for Disease Control recommends 30 days before surgery  · Smokers get more infections after surgery-studies have shown 6 times the risk  · Smokers have more scarring and heal slower-open wounds get infected easier  Prevent Respiratory complications  · Stop smoking  · Turn, cough, and deep breathe even if you have some pain when you do so.  · Splint your incision with a pillow when you cough/deep breath, to help control pain.  · Do not lie in one position for long periods of time.   Prevent Blood Clots  · When you wake move your legs, flex your feet, rotate your ankles, wiggle your toes  · Get up when the doctor says its ok.  Dangle your feet from the side of the bed  · Report symptoms-leg pain,  redness/swelling, warm to touch; fever; shortness of breath, chest pain, severe upper back pain.

## 2019-01-28 NOTE — ANESTHESIA PREPROCEDURE EVALUATION
01/28/2019  Rachel Leal is a 53 y.o., female.    Anesthesia Evaluation    I have reviewed the Patient Summary Reports.    I have reviewed the Nursing Notes.   I have reviewed the Medications.     Review of Systems  Anesthesia Hx:  No problems with previous Anesthesia  History of prior surgery of interest to airway management or planning:  Denies Personal Hx of Anesthesia complications.   Social:  Non-Smoker, No Alcohol Use    Hematology/Oncology:  Hematology Normal   Oncology Normal     EENT/Dental:EENT/Dental Normal   Cardiovascular:   Exercise tolerance: good Hypertension    Pulmonary:  Pulmonary Normal    Renal/:  Renal/ Normal     Hepatic/GI:   Liver Disease,    Musculoskeletal:   Hx parkinsons   Neurological:  Neurology Normal    Endocrine:  Endocrine Normal    Dermatological:  Skin Normal    Psych:  Psychiatric Normal           Physical Exam  General:  Obesity    Airway/Jaw/Neck:  Airway Findings: Mouth Opening: Small, but > 3cm Tongue: Normal, Large  General Airway Assessment: Possible difficult intubation, Possible difficult mask airway, Adult  Mallampati: IV  Improves to III with phonation.  TM Distance: Normal, at least 6 cm  Jaw/Neck Findings:  Neck ROM: Normal ROM     Eyes/Ears/Nose:  Eyes/Ears/Nose Findings:    Dental:  Dental Findings: In tact        Mental Status:  Mental Status Findings:  Cooperative         Anesthesia Plan  Type of Anesthesia, risks & benefits discussed:  Anesthesia Type:  general  Patient's Preference:   Intra-op Monitoring Plan: standard ASA monitors  Intra-op Monitoring Plan Comments:   Post Op Pain Control Plan: multimodal analgesia  Post Op Pain Control Plan Comments:   Induction:   IV  Beta Blocker:  Patient is not currently on a Beta-Blocker (No further documentation required).       Informed Consent: Patient understands risks and agrees with Anesthesia  plan.  Questions answered. Anesthesia consent signed with patient.  ASA Score: 2     Day of Surgery Review of History & Physical: I have interviewed and examined the patient. I have reviewed the patient's H&P dated: 1/31/19. There are no significant changes.  H&P update referred to the surgeon.         Ready For Surgery From Anesthesia Perspective.

## 2019-01-30 NOTE — PROGRESS NOTES
Subjective:       Patient ID: Rachel Leal is a 53 y.o. female.    Chief Complaint: Pre-op Exam    HPI   52 yo F - underwent colonoscopy on 2018 for screening due to a family history of colon cancer in her mother.  She was found to have 2 rectal polyps, 1 of which was in the proximal rectum and was found to be a hyperplastic polyp on pathology, the other of which was in the distal rectum and pathology revealed condyloma acuminatum.  She presented in Oct 2018 for further evaluation.  She had no complaints, no anal bleeding or pain with bowel movements, no trouble with constipation or straining, no weight loss or other constitutional symptoms.  She had a hysterectomy many years ago but did not have history of abnormal Pap smears prior to that.  She has never had an HIV test.  She does have a history of breast cancer currently in remission as well as hereditary hemochromatosis.  At that point she was initially scheduled for surgery but hen had to postpone for personal reasons.    She returns today ready to proceed with scheduling surgery again.  No interval changes since I last saw her.    + family hx of CRC - Mother      Review of patient's allergies indicates:   Allergen Reactions    Neupro [rotigotine] Dermatitis       Past Medical History:   Diagnosis Date    Cancer     breast stage iv, remission     Hereditary hemochromatosis 2015    History of TMJ syndrome     Hyperlipidemia     Hypertension     Hypoglycemia     Parkinson disease        Past Surgical History:   Procedure Laterality Date    BIOPSY LIVER AND ULTRASOUND N/A 2017    Performed by [facility] at [facility] OR [address] [place] [address]    BREAST SURGERY      augmentation     SECTION      COLONOSCOPY      EYE SURGERY      HAND SURGERY      HYSTERECTOMY      TL BSO    LIVER BIOPSY  2017    Mild steatosis, macrovesicular 20% and microvesicular 10%. Mild to moderate siderosis, 2+, within hepatocytes. No  fibrosis    MASTECTOMY Left     TONSILLECTOMY         Current Outpatient Medications   Medication Sig Dispense Refill    ALPRAZolam (XANAX) 0.5 MG tablet Take 1 tablet (0.5 mg total) by mouth nightly as needed. 30 tablet 0    anastrozole (ARIMIDEX) 1 mg Tab Take 1 mg by mouth once daily.      ascorbic acid, vitamin C, (VITAMIN C) 1000 MG tablet Take 1,000 mg by mouth once daily.      carbidopa-levodopa  mg (SINEMET)  mg per tablet TAKE 1 TABLET BY MOUTH 2 (TWO) TIMES DAILY. 180 tablet 3    COCONUT OIL ORAL Take 2 capsules by mouth once daily.      denosumab (XGEVA) 120 mg/1.7 mL (70 mg/mL) Soln Inject 120 mg into the skin. Every 3 months      esomeprazole (NEXIUM) 20 MG capsule Take 1 capsule (20 mg total) by mouth as needed. 30 capsule 0    losartan-hydrochlorothiazide 50-12.5 mg (HYZAAR) 50-12.5 mg per tablet Take 1 tablet by mouth once daily.  11    pravastatin (PRAVACHOL) 40 MG tablet TAKE 1 TABLET (40 MG TOTAL) BY MOUTH ONCE DAILY. 90 tablet 3    venlafaxine (EFFEXOR-XR) 75 MG 24 hr capsule Take 75 mg by mouth once daily.       vitamin D 1000 units Tab Take 1,000 mg by mouth once daily.        No current facility-administered medications for this visit.        Family History   Problem Relation Age of Onset    Colon cancer Mother     Diabetes Father     Heart disease Father     Hemochromatosis Brother     Cirrhosis Brother         hemochromotosis    Breast cancer Neg Hx     Ovarian cancer Neg Hx        Social History     Socioeconomic History    Marital status:      Spouse name: None    Number of children: None    Years of education: None    Highest education level: None   Social Needs    Financial resource strain: None    Food insecurity - worry: None    Food insecurity - inability: None    Transportation needs - medical: None    Transportation needs - non-medical: None   Occupational History    None   Tobacco Use    Smoking status: Former Smoker     Packs/day:  0.25     Types: Cigarettes     Last attempt to quit: 2013     Years since quittin.3    Smokeless tobacco: Never Used   Substance and Sexual Activity    Alcohol use: Yes     Alcohol/week: 6.0 oz     Types: 12 Standard drinks or equivalent per week     Comment: beer, 6 pack twice weekly    Drug use: No    Sexual activity: Yes     Partners: Male     Birth control/protection: Surgical     Comment: .   Other Topics Concern    None   Social History Narrative    None       Review of Systems   Constitutional: Negative for chills and fever.   HENT: Negative for congestion and sore throat.    Eyes: Negative for visual disturbance.   Respiratory: Negative for cough and shortness of breath.    Cardiovascular: Negative for chest pain and palpitations.   Gastrointestinal: Negative for abdominal distention, abdominal pain, anal bleeding, blood in stool, constipation, diarrhea, nausea, rectal pain and vomiting.   Endocrine: Negative for cold intolerance and heat intolerance.   Genitourinary: Negative for dysuria and frequency.   Musculoskeletal: Negative for arthralgias, back pain and neck pain.   Skin: Negative for rash.   Allergic/Immunologic: Negative for immunocompromised state.   Neurological: Negative for dizziness, light-headedness and headaches.   Hematological: Does not bruise/bleed easily.   Psychiatric/Behavioral: Negative for confusion. The patient is not nervous/anxious.        Objective:      Physical Exam   Constitutional: She is oriented to person, place, and time. She appears well-developed and well-nourished.   HENT:   Head: Normocephalic.   Pulmonary/Chest: Effort normal. No respiratory distress.   Abdominal: Soft. Bowel sounds are normal. She exhibits no distension and no mass. There is no tenderness. There is no rebound and no guarding.   Musculoskeletal: Normal range of motion.   Neurological: She is alert and oriented to person, place, and time.   Skin: Skin is warm and dry.    Psychiatric: She has a normal mood and affect.         Lab Results   Component Value Date    WBC 4.94 01/14/2019    HGB 13.9 01/14/2019    HCT 42.0 01/14/2019    MCV 97 01/14/2019     01/14/2019     BMP  Lab Results   Component Value Date     01/14/2019    K 4.0 01/14/2019     01/14/2019    CO2 28 01/14/2019    BUN 17 01/14/2019    CREATININE 0.8 01/14/2019    CALCIUM 9.3 01/14/2019    ANIONGAP 9 01/14/2019    ESTGFRAFRICA >60 01/14/2019    EGFRNONAA >60 01/14/2019     CMP  Sodium   Date Value Ref Range Status   01/14/2019 139 136 - 145 mmol/L Final     Potassium   Date Value Ref Range Status   01/14/2019 4.0 3.5 - 5.1 mmol/L Final     Chloride   Date Value Ref Range Status   01/14/2019 102 95 - 110 mmol/L Final     CO2   Date Value Ref Range Status   01/14/2019 28 23 - 29 mmol/L Final     Glucose   Date Value Ref Range Status   01/14/2019 94 70 - 110 mg/dL Final     BUN, Bld   Date Value Ref Range Status   01/14/2019 17 6 - 20 mg/dL Final     Creatinine   Date Value Ref Range Status   01/14/2019 0.8 0.5 - 1.4 mg/dL Final     Calcium   Date Value Ref Range Status   01/14/2019 9.3 8.7 - 10.5 mg/dL Final     Total Protein   Date Value Ref Range Status   01/14/2019 7.8 6.0 - 8.4 g/dL Final     Albumin   Date Value Ref Range Status   01/14/2019 4.0 3.5 - 5.2 g/dL Final     Total Bilirubin   Date Value Ref Range Status   01/14/2019 0.4 0.1 - 1.0 mg/dL Final     Comment:     For infants and newborns, interpretation of results should be based  on gestational age, weight and in agreement with clinical  observations.  Premature Infant recommended reference ranges:  Up to 24 hours.............<8.0 mg/dL  Up to 48 hours............<12.0 mg/dL  3-5 days..................<15.0 mg/dL  6-29 days.................<15.0 mg/dL       Alkaline Phosphatase   Date Value Ref Range Status   01/14/2019 75 55 - 135 U/L Final     AST   Date Value Ref Range Status   01/14/2019 22 10 - 40 U/L Final     ALT   Date Value Ref  Range Status   01/14/2019 5 (L) 10 - 44 U/L Final     Anion Gap   Date Value Ref Range Status   01/14/2019 9 8 - 16 mmol/L Final     eGFR if    Date Value Ref Range Status   01/14/2019 >60 >60 mL/min/1.73 m^2 Final     eGFR if non    Date Value Ref Range Status   01/14/2019 >60 >60 mL/min/1.73 m^2 Final     Comment:     Calculation used to obtain the estimated glomerular filtration  rate (eGFR) is the CKD-EPI equation.        No results found for: CEA        Assessment:       1. Anal condyloma        Plan:   Re-scheduled for the OR on 1/31/2019 at [facility] for an examination under anesthesia and excision/fulgaratiuon of any residual anal condyloma.    I have discussed the procedure at length with Rachel Leal.  We discussed the rationale, risks, benefits, and alternatives in depth.  We discussed the expected outcomes and potential complications including but not limited to bleeding, infection, recurrence, prolonged pain, need for further procedures and altered continence.  She verbalized her understanding of the procedure and wishes to proceed.  Written consent was obtained.    Yunior Starks MD, FACS, FASCRS  Senior Staff Surgeon  Department of Colon & Rectal Surgery

## 2019-01-30 NOTE — H&P (VIEW-ONLY)
Subjective:       Patient ID: Rachel Leal is a 53 y.o. female.    Chief Complaint: Pre-op Exam    HPI   54 yo F - underwent colonoscopy on 2018 for screening due to a family history of colon cancer in her mother.  She was found to have 2 rectal polyps, 1 of which was in the proximal rectum and was found to be a hyperplastic polyp on pathology, the other of which was in the distal rectum and pathology revealed condyloma acuminatum.  She presented in Oct 2018 for further evaluation.  She had no complaints, no anal bleeding or pain with bowel movements, no trouble with constipation or straining, no weight loss or other constitutional symptoms.  She had a hysterectomy many years ago but did not have history of abnormal Pap smears prior to that.  She has never had an HIV test.  She does have a history of breast cancer currently in remission as well as hereditary hemochromatosis.  At that point she was initially scheduled for surgery but hen had to postpone for personal reasons.    She returns today ready to proceed with scheduling surgery again.  No interval changes since I last saw her.    + family hx of CRC - Mother      Review of patient's allergies indicates:   Allergen Reactions    Neupro [rotigotine] Dermatitis       Past Medical History:   Diagnosis Date    Cancer     breast stage iv, remission     Hereditary hemochromatosis 2015    History of TMJ syndrome     Hyperlipidemia     Hypertension     Hypoglycemia     Parkinson disease        Past Surgical History:   Procedure Laterality Date    BIOPSY LIVER AND ULTRASOUND N/A 2017    Performed by [facility] Diagnostic Provider at [facility] OR [address] [place] [address]    BREAST SURGERY      augmentation     SECTION      COLONOSCOPY      EYE SURGERY      HAND SURGERY      HYSTERECTOMY      TL BSO    LIVER BIOPSY  2017    Mild steatosis, macrovesicular 20% and microvesicular 10%. Mild to moderate siderosis, 2+, within hepatocytes. No  fibrosis    MASTECTOMY Left     TONSILLECTOMY         Current Outpatient Medications   Medication Sig Dispense Refill    ALPRAZolam (XANAX) 0.5 MG tablet Take 1 tablet (0.5 mg total) by mouth nightly as needed. 30 tablet 0    anastrozole (ARIMIDEX) 1 mg Tab Take 1 mg by mouth once daily.      ascorbic acid, vitamin C, (VITAMIN C) 1000 MG tablet Take 1,000 mg by mouth once daily.      carbidopa-levodopa  mg (SINEMET)  mg per tablet TAKE 1 TABLET BY MOUTH 2 (TWO) TIMES DAILY. 180 tablet 3    COCONUT OIL ORAL Take 2 capsules by mouth once daily.      denosumab (XGEVA) 120 mg/1.7 mL (70 mg/mL) Soln Inject 120 mg into the skin. Every 3 months      esomeprazole (NEXIUM) 20 MG capsule Take 1 capsule (20 mg total) by mouth as needed. 30 capsule 0    losartan-hydrochlorothiazide 50-12.5 mg (HYZAAR) 50-12.5 mg per tablet Take 1 tablet by mouth once daily.  11    pravastatin (PRAVACHOL) 40 MG tablet TAKE 1 TABLET (40 MG TOTAL) BY MOUTH ONCE DAILY. 90 tablet 3    venlafaxine (EFFEXOR-XR) 75 MG 24 hr capsule Take 75 mg by mouth once daily.       vitamin D 1000 units Tab Take 1,000 mg by mouth once daily.        No current facility-administered medications for this visit.        Family History   Problem Relation Age of Onset    Colon cancer Mother     Diabetes Father     Heart disease Father     Hemochromatosis Brother     Cirrhosis Brother         hemochromotosis    Breast cancer Neg Hx     Ovarian cancer Neg Hx        Social History     Socioeconomic History    Marital status:      Spouse name: None    Number of children: None    Years of education: None    Highest education level: None   Social Needs    Financial resource strain: None    Food insecurity - worry: None    Food insecurity - inability: None    Transportation needs - medical: None    Transportation needs - non-medical: None   Occupational History    None   Tobacco Use    Smoking status: Former Smoker     Packs/day:  0.25     Types: Cigarettes     Last attempt to quit: 2013     Years since quittin.3    Smokeless tobacco: Never Used   Substance and Sexual Activity    Alcohol use: Yes     Alcohol/week: 6.0 oz     Types: 12 Standard drinks or equivalent per week     Comment: beer, 6 pack twice weekly    Drug use: No    Sexual activity: Yes     Partners: Male     Birth control/protection: Surgical     Comment: .   Other Topics Concern    None   Social History Narrative    None       Review of Systems   Constitutional: Negative for chills and fever.   HENT: Negative for congestion and sore throat.    Eyes: Negative for visual disturbance.   Respiratory: Negative for cough and shortness of breath.    Cardiovascular: Negative for chest pain and palpitations.   Gastrointestinal: Negative for abdominal distention, abdominal pain, anal bleeding, blood in stool, constipation, diarrhea, nausea, rectal pain and vomiting.   Endocrine: Negative for cold intolerance and heat intolerance.   Genitourinary: Negative for dysuria and frequency.   Musculoskeletal: Negative for arthralgias, back pain and neck pain.   Skin: Negative for rash.   Allergic/Immunologic: Negative for immunocompromised state.   Neurological: Negative for dizziness, light-headedness and headaches.   Hematological: Does not bruise/bleed easily.   Psychiatric/Behavioral: Negative for confusion. The patient is not nervous/anxious.        Objective:      Physical Exam   Constitutional: She is oriented to person, place, and time. She appears well-developed and well-nourished.   HENT:   Head: Normocephalic.   Pulmonary/Chest: Effort normal. No respiratory distress.   Abdominal: Soft. Bowel sounds are normal. She exhibits no distension and no mass. There is no tenderness. There is no rebound and no guarding.   Musculoskeletal: Normal range of motion.   Neurological: She is alert and oriented to person, place, and time.   Skin: Skin is warm and dry.    Psychiatric: She has a normal mood and affect.         Lab Results   Component Value Date    WBC 4.94 01/14/2019    HGB 13.9 01/14/2019    HCT 42.0 01/14/2019    MCV 97 01/14/2019     01/14/2019     BMP  Lab Results   Component Value Date     01/14/2019    K 4.0 01/14/2019     01/14/2019    CO2 28 01/14/2019    BUN 17 01/14/2019    CREATININE 0.8 01/14/2019    CALCIUM 9.3 01/14/2019    ANIONGAP 9 01/14/2019    ESTGFRAFRICA >60 01/14/2019    EGFRNONAA >60 01/14/2019     CMP  Sodium   Date Value Ref Range Status   01/14/2019 139 136 - 145 mmol/L Final     Potassium   Date Value Ref Range Status   01/14/2019 4.0 3.5 - 5.1 mmol/L Final     Chloride   Date Value Ref Range Status   01/14/2019 102 95 - 110 mmol/L Final     CO2   Date Value Ref Range Status   01/14/2019 28 23 - 29 mmol/L Final     Glucose   Date Value Ref Range Status   01/14/2019 94 70 - 110 mg/dL Final     BUN, Bld   Date Value Ref Range Status   01/14/2019 17 6 - 20 mg/dL Final     Creatinine   Date Value Ref Range Status   01/14/2019 0.8 0.5 - 1.4 mg/dL Final     Calcium   Date Value Ref Range Status   01/14/2019 9.3 8.7 - 10.5 mg/dL Final     Total Protein   Date Value Ref Range Status   01/14/2019 7.8 6.0 - 8.4 g/dL Final     Albumin   Date Value Ref Range Status   01/14/2019 4.0 3.5 - 5.2 g/dL Final     Total Bilirubin   Date Value Ref Range Status   01/14/2019 0.4 0.1 - 1.0 mg/dL Final     Comment:     For infants and newborns, interpretation of results should be based  on gestational age, weight and in agreement with clinical  observations.  Premature Infant recommended reference ranges:  Up to 24 hours.............<8.0 mg/dL  Up to 48 hours............<12.0 mg/dL  3-5 days..................<15.0 mg/dL  6-29 days.................<15.0 mg/dL       Alkaline Phosphatase   Date Value Ref Range Status   01/14/2019 75 55 - 135 U/L Final     AST   Date Value Ref Range Status   01/14/2019 22 10 - 40 U/L Final     ALT   Date Value Ref  Range Status   01/14/2019 5 (L) 10 - 44 U/L Final     Anion Gap   Date Value Ref Range Status   01/14/2019 9 8 - 16 mmol/L Final     eGFR if    Date Value Ref Range Status   01/14/2019 >60 >60 mL/min/1.73 m^2 Final     eGFR if non    Date Value Ref Range Status   01/14/2019 >60 >60 mL/min/1.73 m^2 Final     Comment:     Calculation used to obtain the estimated glomerular filtration  rate (eGFR) is the CKD-EPI equation.        No results found for: CEA        Assessment:       1. Anal condyloma        Plan:   Re-scheduled for the OR on 1/31/2019 at [facility] for an examination under anesthesia and excision/fulgaratiuon of any residual anal condyloma.    I have discussed the procedure at length with Rachel Leal.  We discussed the rationale, risks, benefits, and alternatives in depth.  We discussed the expected outcomes and potential complications including but not limited to bleeding, infection, recurrence, prolonged pain, need for further procedures and altered continence.  She verbalized her understanding of the procedure and wishes to proceed.  Written consent was obtained.    Yunior Starks MD, FACS, FASCRS  Senior Staff Surgeon  Department of Colon & Rectal Surgery

## 2019-01-31 ENCOUNTER — HOSPITAL ENCOUNTER (OUTPATIENT)
Facility: HOSPITAL | Age: 54
Discharge: HOME | End: 2019-01-31
Attending: COLON & RECTAL SURGERY | Admitting: COLON & RECTAL SURGERY
Payer: COMMERCIAL

## 2019-01-31 ENCOUNTER — ANESTHESIA (OUTPATIENT)
Dept: SURGERY | Facility: HOSPITAL | Age: 54
End: 2019-01-31
Payer: COMMERCIAL

## 2019-01-31 VITALS
RESPIRATION RATE: 17 BRPM | HEIGHT: 62 IN | DIASTOLIC BLOOD PRESSURE: 80 MMHG | WEIGHT: 190 LBS | BODY MASS INDEX: 34.96 KG/M2 | HEART RATE: 68 BPM | OXYGEN SATURATION: 99 % | TEMPERATURE: 98 F | SYSTOLIC BLOOD PRESSURE: 124 MMHG

## 2019-01-31 DIAGNOSIS — A63.0 CONDYLOMA: ICD-10-CM

## 2019-01-31 PROCEDURE — 88305 TISSUE SPECIMEN TO PATHOLOGY - SURGERY: ICD-10-PCS | Mod: 26,,, | Performed by: PATHOLOGY

## 2019-01-31 PROCEDURE — S0020 INJECTION, BUPIVICAINE HYDRO: HCPCS | Performed by: COLON & RECTAL SURGERY

## 2019-01-31 PROCEDURE — 36000706: Performed by: COLON & RECTAL SURGERY

## 2019-01-31 PROCEDURE — 25000003 PHARM REV CODE 250: Performed by: COLON & RECTAL SURGERY

## 2019-01-31 PROCEDURE — 46910 DESTRUCTION ANAL LESION(S): CPT | Mod: 51,,, | Performed by: COLON & RECTAL SURGERY

## 2019-01-31 PROCEDURE — 37000008 HC ANESTHESIA 1ST 15 MINUTES: Performed by: COLON & RECTAL SURGERY

## 2019-01-31 PROCEDURE — 88305 TISSUE EXAM BY PATHOLOGIST: CPT | Mod: 26,,, | Performed by: PATHOLOGY

## 2019-01-31 PROCEDURE — 46922 PR SURG EXCISION OF ANAL LESION(S): ICD-10-PCS | Mod: ,,, | Performed by: COLON & RECTAL SURGERY

## 2019-01-31 PROCEDURE — 88342 TISSUE SPECIMEN TO PATHOLOGY - SURGERY: ICD-10-PCS | Mod: 26,,, | Performed by: PATHOLOGY

## 2019-01-31 PROCEDURE — 46910 PR ELECTRODESSICATN,ANAL LESN(S): ICD-10-PCS | Mod: 51,,, | Performed by: COLON & RECTAL SURGERY

## 2019-01-31 PROCEDURE — 88305 TISSUE EXAM BY PATHOLOGIST: CPT | Performed by: PATHOLOGY

## 2019-01-31 PROCEDURE — 46922 EXCISION OF ANAL LESION(S): CPT | Mod: ,,, | Performed by: COLON & RECTAL SURGERY

## 2019-01-31 PROCEDURE — 71000033 HC RECOVERY, INTIAL HOUR: Performed by: COLON & RECTAL SURGERY

## 2019-01-31 PROCEDURE — 00902 ANES ANORECTAL PX: CPT | Mod: QZ,P2 | Performed by: NURSE ANESTHETIST, CERTIFIED REGISTERED

## 2019-01-31 PROCEDURE — 63600175 PHARM REV CODE 636 W HCPCS: Performed by: NURSE ANESTHETIST, CERTIFIED REGISTERED

## 2019-01-31 PROCEDURE — 25000003 PHARM REV CODE 250: Performed by: NURSE ANESTHETIST, CERTIFIED REGISTERED

## 2019-01-31 PROCEDURE — 36000707: Performed by: COLON & RECTAL SURGERY

## 2019-01-31 PROCEDURE — 88342 IMHCHEM/IMCYTCHM 1ST ANTB: CPT | Mod: 26,,, | Performed by: PATHOLOGY

## 2019-01-31 PROCEDURE — 37000009 HC ANESTHESIA EA ADD 15 MINS: Performed by: COLON & RECTAL SURGERY

## 2019-01-31 RX ORDER — LIDOCAINE HYDROCHLORIDE 10 MG/ML
INJECTION INFILTRATION; PERINEURAL
Status: DISCONTINUED | OUTPATIENT
Start: 2019-01-31 | End: 2019-01-31 | Stop reason: HOSPADM

## 2019-01-31 RX ORDER — LIDOCAINE HYDROCHLORIDE 20 MG/ML
INJECTION, SOLUTION EPIDURAL; INFILTRATION; INTRACAUDAL; PERINEURAL
Status: DISCONTINUED | OUTPATIENT
Start: 2019-01-31 | End: 2019-01-31

## 2019-01-31 RX ORDER — FENTANYL CITRATE 50 UG/ML
INJECTION, SOLUTION INTRAMUSCULAR; INTRAVENOUS
Status: DISCONTINUED | OUTPATIENT
Start: 2019-01-31 | End: 2019-01-31

## 2019-01-31 RX ORDER — OXYCODONE AND ACETAMINOPHEN 5; 325 MG/1; MG/1
1-2 TABLET ORAL EVERY 4 HOURS PRN
Qty: 30 TABLET | Refills: 0 | Status: SHIPPED | OUTPATIENT
Start: 2019-01-31 | End: 2019-09-11

## 2019-01-31 RX ORDER — PROPOFOL 10 MG/ML
VIAL (ML) INTRAVENOUS
Status: DISCONTINUED | OUTPATIENT
Start: 2019-01-31 | End: 2019-01-31

## 2019-01-31 RX ORDER — ONDANSETRON HYDROCHLORIDE 2 MG/ML
INJECTION, SOLUTION INTRAMUSCULAR; INTRAVENOUS
Status: DISCONTINUED | OUTPATIENT
Start: 2019-01-31 | End: 2019-01-31

## 2019-01-31 RX ORDER — BUPIVACAINE HYDROCHLORIDE 5 MG/ML
INJECTION, SOLUTION EPIDURAL; INTRACAUDAL
Status: DISCONTINUED | OUTPATIENT
Start: 2019-01-31 | End: 2019-01-31 | Stop reason: HOSPADM

## 2019-01-31 RX ORDER — MIDAZOLAM HYDROCHLORIDE 1 MG/ML
INJECTION INTRAMUSCULAR; INTRAVENOUS
Status: DISCONTINUED | OUTPATIENT
Start: 2019-01-31 | End: 2019-01-31

## 2019-01-31 RX ORDER — SODIUM CHLORIDE, SODIUM LACTATE, POTASSIUM CHLORIDE, CALCIUM CHLORIDE 600; 310; 30; 20 MG/100ML; MG/100ML; MG/100ML; MG/100ML
INJECTION, SOLUTION INTRAVENOUS CONTINUOUS
Status: DISCONTINUED | OUTPATIENT
Start: 2019-01-31 | End: 2019-01-31 | Stop reason: HOSPADM

## 2019-01-31 RX ORDER — OXYCODONE AND ACETAMINOPHEN 5; 325 MG/1; MG/1
1 TABLET ORAL EVERY 4 HOURS PRN
Status: DISCONTINUED | OUTPATIENT
Start: 2019-01-31 | End: 2019-01-31 | Stop reason: HOSPADM

## 2019-01-31 RX ADMIN — PROPOFOL 20 MG: 10 INJECTION, EMULSION INTRAVENOUS at 11:01

## 2019-01-31 RX ADMIN — PROPOFOL 30 MG: 10 INJECTION, EMULSION INTRAVENOUS at 11:01

## 2019-01-31 RX ADMIN — PROPOFOL 150 MG: 10 INJECTION, EMULSION INTRAVENOUS at 11:01

## 2019-01-31 RX ADMIN — ONDANSETRON 8 MG: 2 INJECTION, SOLUTION INTRAMUSCULAR; INTRAVENOUS at 11:01

## 2019-01-31 RX ADMIN — LIDOCAINE HYDROCHLORIDE 60 MG: 20 INJECTION, SOLUTION EPIDURAL; INFILTRATION; INTRACAUDAL; PERINEURAL at 11:01

## 2019-01-31 RX ADMIN — FENTANYL CITRATE 50 MCG: 50 INJECTION, SOLUTION INTRAMUSCULAR; INTRAVENOUS at 11:01

## 2019-01-31 RX ADMIN — MIDAZOLAM HYDROCHLORIDE 2 MG: 1 INJECTION, SOLUTION INTRAMUSCULAR; INTRAVENOUS at 11:01

## 2019-01-31 RX ADMIN — SODIUM CHLORIDE, SODIUM LACTATE, POTASSIUM CHLORIDE, AND CALCIUM CHLORIDE: .6; .31; .03; .02 INJECTION, SOLUTION INTRAVENOUS at 10:01

## 2019-01-31 NOTE — INTERVAL H&P NOTE
The patient has been examined and the H&P has been reviewed:        I concur with the findings and no changes have occurred since H&P was written.        Patient cleared for Anesthesia: MAC        Anesthesia/Surgery risks, benefits and alternative options discussed and understood by patient/family.      Active Hospital Problems    Diagnosis  POA    Condyloma [A63.0]  Yes      Resolved Hospital Problems   No resolved problems to display.

## 2019-01-31 NOTE — DISCHARGE SUMMARY
Discharge Note      SUMMARY     Admit Date: 1/31/2019    Attending Physician: Yunior Starks MD     Discharge Physician: Yunior Starks MD    Discharge Date: 1/31/2019     Final Diagnosis: Post-Op Diagnosis Codes:     * Anal condyloma [A63.0]    Hospital Course: Patient was admitted for an outpatient procedure and tolerated the procedure well with no complications.    Disposition: Home or Self Care    Follow Up/Patient Instructions:   High fiber diet/daily fiber supplement  8-10 glasses of water/day  Colace 100 mg 2x/day  Miralax 1 capful in glass of water 2x/day if needed for constipation  Avoid straining/constipation  Gauze packing will pass with 1st BM  Dry dressing as needed  Some bleeding is expected - call for excessive bleeding  Call for severe pain, fever >101, difficulty urinating, constipation  Follow-up with Dr. Starks in 3 weeks        Current Discharge Medication List      START taking these medications    Details   oxyCODONE-acetaminophen (PERCOCET) 5-325 mg per tablet Take 1-2 tablets by mouth every 4 (four) hours as needed for Pain.  Qty: 30 tablet, Refills: 0         CONTINUE these medications which have NOT CHANGED    Details   anastrozole (ARIMIDEX) 1 mg Tab Take 1 mg by mouth once daily.      carbidopa-levodopa  mg (SINEMET)  mg per tablet TAKE 1 TABLET BY MOUTH 2 (TWO) TIMES DAILY.  Qty: 180 tablet, Refills: 3      esomeprazole (NEXIUM) 20 MG capsule Take 1 capsule (20 mg total) by mouth as needed.  Qty: 30 capsule, Refills: 0      losartan-hydrochlorothiazide 50-12.5 mg (HYZAAR) 50-12.5 mg per tablet Take 1 tablet by mouth once daily.  Refills: 11      pravastatin (PRAVACHOL) 40 MG tablet TAKE 1 TABLET (40 MG TOTAL) BY MOUTH ONCE DAILY.  Qty: 90 tablet, Refills: 3    Associated Diagnoses: Hyperlipidemia, unspecified hyperlipidemia type      venlafaxine (EFFEXOR-XR) 75 MG 24 hr capsule Take 75 mg by mouth once daily.       vitamin D 1000 units Tab Take 1,000 mg by mouth once daily.        ALPRAZolam (XANAX) 0.5 MG tablet Take 1 tablet (0.5 mg total) by mouth nightly as needed.  Qty: 30 tablet, Refills: 0    Comments: This request is for a new prescription for a controlled substance as required by Federal/State law.  Associated Diagnoses: Anxiety      ascorbic acid, vitamin C, (VITAMIN C) 1000 MG tablet Take 1,000 mg by mouth once daily.      COCONUT OIL ORAL Take 2 capsules by mouth once daily.      denosumab (XGEVA) 120 mg/1.7 mL (70 mg/mL) Soln Inject 120 mg into the skin. Every 3 months             Discharge Procedure Orders (must include Diet, Follow-up, Activity)   Discharge Procedure Orders (must include Diet, Follow-up, Activity)   Diet Adult Regular     Activity as tolerated

## 2019-01-31 NOTE — TRANSFER OF CARE
Anesthesia Transfer of Care Note    Patient: Rachel Leal    Procedure(s) Performed: Procedure(s) (LRB):  EXCISION-CONDYLOMA (N/A)    Patient location: PACU    Anesthesia Type: general    Transport from OR: Transported from OR on 6-10 L/min O2 by face mask with adequate spontaneous ventilation    Post pain: adequate analgesia    Post assessment: no apparent anesthetic complications and tolerated procedure well    Post vital signs: stable    Level of consciousness: sedated    Nausea/Vomiting: no nausea/vomiting    Complications: none    Transfer of care protocol was followed      Last vitals:   Visit Vitals  /86   LMP 01/17/2012   Breastfeeding? No

## 2019-01-31 NOTE — BRIEF OP NOTE
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.0]    Operative Care:  Post-op Diagnosis: Post-Op Diagnosis Codes:     * Anal condyloma [A63.0]    Procedure(s) (LRB):  EXCISION-CONDYLOMA (N/A)    Anesthesia: General  Total volume administered 300 cc    Technical Procedures Used: Excision/fulgaration of anal condyloma    Description of the findings of the procedure: Punctate, condylomatous lesions in anal canal    Wound Class (Contaminated    Complications: No     Estimated Blood Loss: <5 mL           Specimens:   Specimen (12h ago, onward)    Start     Ordered    01/31/19 1134  Specimen to Pathology - Surgery  Once     Comments:  Pre-op:anal condylomaPost-op:sameProcedure:excision anal condylomaSpecimen:anal condylomaPhysician: Tereza     Start Status   01/31/19 1134 Collected (01/31/19 1145)       01/31/19 1145          Implants: * No implants in log *    Post-Operative Care:         Disposition: PACU - hemodynamically stable.           Condition: Good  PT Temp >36 upon leaving the OR? Yes

## 2019-01-31 NOTE — OP NOTE
DATE OF PROCEDURE:  01/31/2019.    PREOPERATIVE DIAGNOSIS:  Anal canal condyloma.    POSTOPERATIVE DIAGNOSIS:  Anal canal condyloma.    PROCEDURE PERFORMED:  Excision and fulguration of anal canal condyloma.    SURGEON:  Yunior Starks M.D.    ASSISTANT:  None.    ANESTHESIA:  Local MAC.    IV FLUIDS:  300 mL crystalloid.    ESTIMATED BLOOD LOSS:  Less than 5 mL.    SPECIMEN:  Anal condyloma to Pathology.    DRAINS:  None.    COMPLICATIONS:  None.    OPERATIVE FINDINGS:  Small punctate internal anal canal condyloma.    INDICATIONS FOR PROCEDURE:  The patient is a 53-year-old female who was found on   recent screening colonoscopy to have condylomatous-appearing lesions in the   anal canal.  She was referred for surgical management.    DESCRIPTION OF PROCEDURE:  The patient was identified, brought to the Operating   Room and placed on the table in supine position after obtaining informed   consent.  Venous sequential stockings were placed.  After initiation of   monitored anesthesia care, she was repositioned in a high lithotomy position   using Yellofin stirrups.  The perianal region was prepped and draped in usual   sterile fashion.  An anal block was performed using a combination of 1%   lidocaine with epinephrine and 0.5% Marcaine.  Visual inspection of the anal   margin and anal verge revealed no external lesions.  I then evaluated the anal   canal with the lighted Palencia anal retractor.  There were a few small punctate   condylomatous-appearing lesions at the level of the dentate line.  These were   systematically excised sharply using Metzenbaum scissors and sent to Pathology.    Hemostasis was achieved with electrocautery.  The area surrounding each of the   lesions was fulgurated with electrocautery.  Once we were ensured of adequate   hemostasis and eradication of all obvious lesions, gelfoam gauze was placed   within the anal canal and sterile dressings were applied.    The patient tolerated the procedure well  with no complications.  She was   awakened from sedation in the Operating Room and taken to Recovery in   satisfactory condition.  All needle, instrument and sponge counts were correct   at the end of the case.  I was present throughout the entire procedure.      NAZANIN  dd: 01/31/2019 12:02:04 (CST)  td: 01/31/2019 12:15:44 (CST)  Doc ID   #[id number]  Job ID #[id number]    CC:

## 2019-01-31 NOTE — DISCHARGE INSTRUCTIONS
Follow Up/Patient Instructions:   High fiber diet/daily fiber supplement  8-10 glasses of water/day  Colace 100 mg 2x/day  Miralax 1 capful in glass of water 2x/day if needed for constipation  Avoid straining/constipation  Gauze packing will pass with 1st BM  Dry dressing as needed  Some bleeding is expected - call for excessive bleeding  Call for severe pain, fever >101, difficulty urinating, constipation  Follow-up with Dr. Starks in 3 weeks

## 2019-01-31 NOTE — PLAN OF CARE
Problem: Adult Inpatient Plan of Care  Goal: Plan of Care Review  Outcome: Outcome(s) achieved Date Met: 01/31/19  Patient aware of plan of care. VS stable. Afebrile. Tolerating clear liquid diet, no N/V. Up to restroom, voiding without difficulty. Denies pain. Discharge paperwork reviewed and given. Prescription given to patient. Free from falls/injuries. No questions or concerns at this time. Agrees with plan of care.

## 2019-01-31 NOTE — ANESTHESIA POSTPROCEDURE EVALUATION
Anesthesia Post Evaluation    Patient: Rachel Leal    Procedure(s) Performed: Procedure(s) (LRB):  EXCISION-CONDYLOMA (N/A)    Final Anesthesia Type: general  Patient location during evaluation: PACU  Patient participation: Yes- Able to Participate  Level of consciousness: awake and alert, oriented and awake  Post-procedure vital signs: reviewed and stable  Pain management: adequate  Airway patency: patent  PONV status at discharge: No PONV  Anesthetic complications: no      Cardiovascular status: blood pressure returned to baseline, hemodynamically stable and stable  Respiratory status: unassisted, spontaneous ventilation and room air  Hydration status: euvolemic  Follow-up not needed.        Visit Vitals  /72 (BP Location: Right arm, Patient Position: Sitting)   Pulse 72   Temp 36.2 °C (97.2 °F) (Tympanic)   Resp 16   LMP 01/17/2012   SpO2 96%   Breastfeeding? No       Pain/Scott Score: Scott Score: 10 (1/31/2019 12:22 PM)

## 2019-02-13 ENCOUNTER — LAB VISIT (OUTPATIENT)
Dept: LAB | Facility: HOSPITAL | Age: 54
End: 2019-02-13
Attending: NURSE PRACTITIONER
Payer: COMMERCIAL

## 2019-02-13 DIAGNOSIS — E83.110 HEREDITARY HEMOCHROMATOSIS: ICD-10-CM

## 2019-02-13 LAB
ALBUMIN SERPL BCP-MCNC: 4 G/DL
ALP SERPL-CCNC: 68 U/L
ALT SERPL W/O P-5'-P-CCNC: <5 U/L
ANION GAP SERPL CALC-SCNC: 10 MMOL/L
AST SERPL-CCNC: 19 U/L
BASOPHILS # BLD AUTO: 0.01 K/UL
BASOPHILS NFR BLD: 0.2 %
BILIRUB SERPL-MCNC: 0.5 MG/DL
BUN SERPL-MCNC: 17 MG/DL
CALCIUM SERPL-MCNC: 9.7 MG/DL
CHLORIDE SERPL-SCNC: 103 MMOL/L
CO2 SERPL-SCNC: 27 MMOL/L
CREAT SERPL-MCNC: 0.8 MG/DL
DIFFERENTIAL METHOD: ABNORMAL
EOSINOPHIL # BLD AUTO: 0.1 K/UL
EOSINOPHIL NFR BLD: 1.6 %
ERYTHROCYTE [DISTWIDTH] IN BLOOD BY AUTOMATED COUNT: 11.6 %
EST. GFR  (AFRICAN AMERICAN): >60 ML/MIN/1.73 M^2
EST. GFR  (NON AFRICAN AMERICAN): >60 ML/MIN/1.73 M^2
FERRITIN SERPL-MCNC: 329 NG/ML
GLUCOSE SERPL-MCNC: 96 MG/DL
HCT VFR BLD AUTO: 39.8 %
HGB BLD-MCNC: 13.4 G/DL
LYMPHOCYTES # BLD AUTO: 1.3 K/UL
LYMPHOCYTES NFR BLD: 22 %
MCH RBC QN AUTO: 32.1 PG
MCHC RBC AUTO-ENTMCNC: 33.7 G/DL
MCV RBC AUTO: 95 FL
MONOCYTES # BLD AUTO: 0.6 K/UL
MONOCYTES NFR BLD: 11.1 %
NEUTROPHILS # BLD AUTO: 3.8 K/UL
NEUTROPHILS NFR BLD: 65.1 %
PLATELET # BLD AUTO: 176 K/UL
PMV BLD AUTO: 10.6 FL
POTASSIUM SERPL-SCNC: 4.1 MMOL/L
PROT SERPL-MCNC: 7.4 G/DL
RBC # BLD AUTO: 4.17 M/UL
SODIUM SERPL-SCNC: 140 MMOL/L
WBC # BLD AUTO: 5.76 K/UL

## 2019-02-13 PROCEDURE — 82728 ASSAY OF FERRITIN: CPT

## 2019-02-13 PROCEDURE — 36415 COLL VENOUS BLD VENIPUNCTURE: CPT

## 2019-02-13 PROCEDURE — 80053 COMPREHEN METABOLIC PANEL: CPT

## 2019-02-13 PROCEDURE — 85025 COMPLETE CBC W/AUTO DIFF WBC: CPT

## 2019-02-14 ENCOUNTER — TELEPHONE (OUTPATIENT)
Dept: HEPATOLOGY | Facility: CLINIC | Age: 54
End: 2019-02-14

## 2019-02-14 NOTE — TELEPHONE ENCOUNTER
Please advise pt that ferritin remains elevated but is improving, now down to 329. Continue phlebotomy Q 4 weeks for now x 4 with labs for CBC, Ferritin, CMP. Please ensure these are arranged.

## 2019-02-14 NOTE — TELEPHONE ENCOUNTER
Patient called back relay message below regarding her labs. She will do her phlebotomy every 4 weeks all the way till MAY at. [place] blood [facility] with labs.

## 2019-03-12 ENCOUNTER — LAB VISIT (OUTPATIENT)
Dept: LAB | Facility: HOSPITAL | Age: 54
End: 2019-03-12
Attending: NURSE PRACTITIONER
Payer: COMMERCIAL

## 2019-03-12 DIAGNOSIS — E83.110 HEREDITARY HEMOCHROMATOSIS: ICD-10-CM

## 2019-03-12 LAB
ALBUMIN SERPL BCP-MCNC: 3.6 G/DL
ALP SERPL-CCNC: 65 U/L
ALT SERPL W/O P-5'-P-CCNC: <5 U/L
ANION GAP SERPL CALC-SCNC: 10 MMOL/L
AST SERPL-CCNC: 18 U/L
BASOPHILS # BLD AUTO: 0 K/UL
BASOPHILS NFR BLD: 0 %
BILIRUB SERPL-MCNC: 0.3 MG/DL
BUN SERPL-MCNC: 18 MG/DL
CALCIUM SERPL-MCNC: 9.8 MG/DL
CHLORIDE SERPL-SCNC: 104 MMOL/L
CO2 SERPL-SCNC: 26 MMOL/L
CREAT SERPL-MCNC: 0.8 MG/DL
DIFFERENTIAL METHOD: ABNORMAL
EOSINOPHIL # BLD AUTO: 0.1 K/UL
EOSINOPHIL NFR BLD: 1.9 %
ERYTHROCYTE [DISTWIDTH] IN BLOOD BY AUTOMATED COUNT: 11.5 %
EST. GFR  (AFRICAN AMERICAN): >60 ML/MIN/1.73 M^2
EST. GFR  (NON AFRICAN AMERICAN): >60 ML/MIN/1.73 M^2
GLUCOSE SERPL-MCNC: 93 MG/DL
HCT VFR BLD AUTO: 37.7 %
HGB BLD-MCNC: 12.5 G/DL
LYMPHOCYTES # BLD AUTO: 1.2 K/UL
LYMPHOCYTES NFR BLD: 24.6 %
MCH RBC QN AUTO: 32 PG
MCHC RBC AUTO-ENTMCNC: 33.2 G/DL
MCV RBC AUTO: 96 FL
MONOCYTES # BLD AUTO: 0.5 K/UL
MONOCYTES NFR BLD: 10.8 %
NEUTROPHILS # BLD AUTO: 3 K/UL
NEUTROPHILS NFR BLD: 62.7 %
PLATELET # BLD AUTO: 157 K/UL
PMV BLD AUTO: 11 FL
POTASSIUM SERPL-SCNC: 4.1 MMOL/L
PROT SERPL-MCNC: 6.8 G/DL
RBC # BLD AUTO: 3.91 M/UL
SODIUM SERPL-SCNC: 140 MMOL/L
WBC # BLD AUTO: 4.83 K/UL

## 2019-03-12 PROCEDURE — 36415 COLL VENOUS BLD VENIPUNCTURE: CPT

## 2019-03-12 PROCEDURE — 80053 COMPREHEN METABOLIC PANEL: CPT

## 2019-03-12 PROCEDURE — 82728 ASSAY OF FERRITIN: CPT

## 2019-03-12 PROCEDURE — 85025 COMPLETE CBC W/AUTO DIFF WBC: CPT

## 2019-03-13 ENCOUNTER — TELEPHONE (OUTPATIENT)
Dept: HEPATOLOGY | Facility: CLINIC | Age: 54
End: 2019-03-13

## 2019-03-13 DIAGNOSIS — E83.110 HEREDITARY HEMOCHROMATOSIS: Primary | ICD-10-CM

## 2019-03-13 LAB — FERRITIN SERPL-MCNC: 199 NG/ML

## 2019-03-20 NOTE — TELEPHONE ENCOUNTER
MA called patient again still unable to reached left her VM to please give us a callback.   
MA called patient she is unable to reached left her Vm to please give us a callback.   
Please advise pt that ferritin remains elevated but is improving, now down to 199. Continue phlebotomy Q 4 weeks for now x 4 with labs for CBC, Ferritin, CMP. Please ensure these are arranged.     Also schedule u/s and Fibroscan same day as f/u appt with me.  
Parent

## 2019-04-09 ENCOUNTER — OFFICE VISIT (OUTPATIENT)
Dept: NEUROLOGY | Facility: CLINIC | Age: 54
End: 2019-04-09
Payer: COMMERCIAL

## 2019-04-09 VITALS
WEIGHT: 194.25 LBS | HEART RATE: 80 BPM | BODY MASS INDEX: 35.75 KG/M2 | RESPIRATION RATE: 18 BRPM | HEIGHT: 62 IN | SYSTOLIC BLOOD PRESSURE: 104 MMHG | DIASTOLIC BLOOD PRESSURE: 80 MMHG

## 2019-04-09 DIAGNOSIS — G20.A1 PARKINSON DISEASE: Primary | ICD-10-CM

## 2019-04-09 DIAGNOSIS — E83.110 HEREDITARY HEMOCHROMATOSIS: ICD-10-CM

## 2019-04-09 DIAGNOSIS — G56.01 CARPAL TUNNEL SYNDROME OF RIGHT WRIST: ICD-10-CM

## 2019-04-09 PROCEDURE — 99214 PR OFFICE/OUTPT VISIT, EST, LEVL IV, 30-39 MIN: ICD-10-PCS | Mod: S$GLB,,, | Performed by: PSYCHIATRY & NEUROLOGY

## 2019-04-09 PROCEDURE — 99999 PR PBB SHADOW E&M-EST. PATIENT-LVL III: CPT | Mod: PBBFAC,,, | Performed by: PSYCHIATRY & NEUROLOGY

## 2019-04-09 PROCEDURE — 99999 PR PBB SHADOW E&M-EST. PATIENT-LVL III: ICD-10-PCS | Mod: PBBFAC,,, | Performed by: PSYCHIATRY & NEUROLOGY

## 2019-04-09 PROCEDURE — 3008F PR BODY MASS INDEX (BMI) DOCUMENTED: ICD-10-PCS | Mod: CPTII,S$GLB,, | Performed by: PSYCHIATRY & NEUROLOGY

## 2019-04-09 PROCEDURE — 99214 OFFICE O/P EST MOD 30 MIN: CPT | Mod: S$GLB,,, | Performed by: PSYCHIATRY & NEUROLOGY

## 2019-04-09 PROCEDURE — 3008F BODY MASS INDEX DOCD: CPT | Mod: CPTII,S$GLB,, | Performed by: PSYCHIATRY & NEUROLOGY

## 2019-04-09 RX ORDER — CARBIDOPA AND LEVODOPA 25; 100 MG/1; MG/1
1 TABLET ORAL 2 TIMES DAILY
Qty: 180 TABLET | Refills: 3 | Status: SHIPPED | OUTPATIENT
Start: 2019-04-09 | End: 2020-04-15

## 2019-04-09 NOTE — PROGRESS NOTES
HPI:  Rachel Leal is a 54 y.o. female with C spine MRI showed mild degenerative changes but some NF narrowing by Xray.  EMG showed Left Carpal tunnel syndrome (mild to moderate) and  Borderline/ Early Carpal Tunnel Syndrome on the Right now s/p Left CTR  And over 3 years of right arm jumping after stretching and sometimes decreased strength/fine movement -- progressing to right and arm leg tremor and right bradykinesia all suggesting Parkinson's disease.     She states she uses either 1-2 pills and does have tremor, but taking 2 daily has not made too much difference.   He walking has been good, swallowing and mood are good.  No constipation .   Still follows with hepatology for hemochromatosis and has 3 time a year phlebotomy which does not seem to change her tremor.   No neck pain and no CTS symptoms currently    Review of Systems   Constitutional: Negative for fever.   HENT: Negative for nosebleeds.    Eyes: Negative for double vision.   Respiratory: Negative for shortness of breath.    Cardiovascular: Negative for leg swelling.   Gastrointestinal: Negative for blood in stool.   Genitourinary: Negative for hematuria.   Musculoskeletal: Negative for falls.   Skin: Negative for rash.   Neurological: Positive for tremors.   Psychiatric/Behavioral: Negative for memory loss.       Exam:  Gen Appearance, well developed/nourished in no apparent distress  CV: 2+ distal pulses with no edema or swelling  Neuro:  MS: Awake, alert,  Sustains attention. Recent/remote memory intact, Language is full to spontaneous speech/comprehension. Fund of Knowledge is full  CN: Optic discs are flat with normal vasculature, PERRL, Extraoccular movements and visual fields are full. Normal facial sensation and strength,  Tongue and Palate are midline and strong. Shoulder Shrug symmetric and strong.  Motor: Normal bulk, tone is increased with mild rigidity on the right arm. Very fleeting resting tremor  At times in the hands,  She is  bradykinetic on the right with finger tapping than left- mildly improved on sinemet again today . 5/5 strength bilateral upper/lower extremities with 2+ reflexes   Sensory: symmetric to  temp, and vibration.  Romberg negative  Cerebellar: Finger-nose, Rapid alternating movements intact  Gait: Normal stance, no ataxia and no enbloc turning but reduced arm swing on the right. No shuffling and good postural reflexes.         EMG 2014:   1. Left Carpal tunnel syndrome (mild to moderate)  2. Borderline/ Early Carpal Tunnel Syndrome on the Right      12/2015 MRI brain : No significant abnormalities.   Some mild white matter disease, very mild        Assessment/Plan: Rachel Leal is a 54 y.o. female with C spine MRI showed mild degenerative changes but some NF narrowing by Xray.  EMG showed Left Carpal tunnel syndrome (mild to moderate) and  Borderline/ Early Carpal Tunnel Syndrome on the Right now s/p Left CTR  And then right arm jumping after stretching and sometimes decreased strength/fine movement -- progressing to right and arm leg tremor and right bradykinesia all suggesting Parkinson's disease.   I recommend:   1. PD thought to be genetic per movement disorder's clinic, Dr Hinson- will send back if/when needed for DBS consideration if able  2. Note her Hereditary hemochromatosis. She is a homozygote for K3731D. Note idiopathic PD has been reported in this condition along with other basal ganglia changes related to iron deposition. Her prior MRI brain was unremarkable. Hemochromatosis is currently followed and treated by hepatology  3. Mirapex and Requip caused elevated BP, she could not tolerate neuopro patch due to skin reaction.  - Hesitant to start Azilect at this time given her side effects prior.   -Can consider am amandatine for PD and likely PD related fatigue at any point- she declined  4.  Sinemet 1-2 times per day is well tolerated but not an ideal long term treatment as she will be at risk of  dyskinesia (reviewed today)- no changes made from movement disorders specialist.    5. Her Cervical neck pain is also resolved and her CTS Symptoms on the right are not active    RTC in 1 year per her request- sooner if worse

## 2019-04-23 ENCOUNTER — LAB VISIT (OUTPATIENT)
Dept: LAB | Facility: HOSPITAL | Age: 54
End: 2019-04-23
Attending: NURSE PRACTITIONER
Payer: COMMERCIAL

## 2019-04-23 DIAGNOSIS — E83.110 HEREDITARY HEMOCHROMATOSIS: ICD-10-CM

## 2019-04-23 LAB
ALBUMIN SERPL BCP-MCNC: 3.8 G/DL (ref 3.5–5.2)
ALP SERPL-CCNC: 70 U/L (ref 55–135)
ALT SERPL W/O P-5'-P-CCNC: 14 U/L (ref 10–44)
ANION GAP SERPL CALC-SCNC: 10 MMOL/L (ref 8–16)
AST SERPL-CCNC: 18 U/L (ref 10–40)
BASOPHILS # BLD AUTO: 0.02 K/UL (ref 0–0.2)
BASOPHILS NFR BLD: 0.4 % (ref 0–1.9)
BILIRUB SERPL-MCNC: 0.2 MG/DL (ref 0.1–1)
BUN SERPL-MCNC: 16 MG/DL (ref 6–20)
CALCIUM SERPL-MCNC: 9.4 MG/DL (ref 8.7–10.5)
CHLORIDE SERPL-SCNC: 105 MMOL/L (ref 95–110)
CO2 SERPL-SCNC: 25 MMOL/L (ref 23–29)
CREAT SERPL-MCNC: 0.8 MG/DL (ref 0.5–1.4)
DIFFERENTIAL METHOD: ABNORMAL
EOSINOPHIL # BLD AUTO: 0.1 K/UL (ref 0–0.5)
EOSINOPHIL NFR BLD: 1.7 % (ref 0–8)
ERYTHROCYTE [DISTWIDTH] IN BLOOD BY AUTOMATED COUNT: 11.8 % (ref 11.5–14.5)
EST. GFR  (AFRICAN AMERICAN): >60 ML/MIN/1.73 M^2
EST. GFR  (NON AFRICAN AMERICAN): >60 ML/MIN/1.73 M^2
GLUCOSE SERPL-MCNC: 88 MG/DL (ref 70–110)
HCT VFR BLD AUTO: 40.9 % (ref 37–48.5)
HGB BLD-MCNC: 13.6 G/DL (ref 12–16)
LYMPHOCYTES # BLD AUTO: 1.4 K/UL (ref 1–4.8)
LYMPHOCYTES NFR BLD: 25.9 % (ref 18–48)
MCH RBC QN AUTO: 32 PG (ref 27–31)
MCHC RBC AUTO-ENTMCNC: 33.3 G/DL (ref 32–36)
MCV RBC AUTO: 96 FL (ref 82–98)
MONOCYTES # BLD AUTO: 0.5 K/UL (ref 0.3–1)
MONOCYTES NFR BLD: 9.9 % (ref 4–15)
NEUTROPHILS # BLD AUTO: 3.3 K/UL (ref 1.8–7.7)
NEUTROPHILS NFR BLD: 62.1 % (ref 38–73)
PLATELET # BLD AUTO: 180 K/UL (ref 150–350)
PMV BLD AUTO: 11 FL (ref 9.2–12.9)
POTASSIUM SERPL-SCNC: 4.4 MMOL/L (ref 3.5–5.1)
PROT SERPL-MCNC: 7.5 G/DL (ref 6–8.4)
RBC # BLD AUTO: 4.25 M/UL (ref 4–5.4)
SODIUM SERPL-SCNC: 140 MMOL/L (ref 136–145)
WBC # BLD AUTO: 5.26 K/UL (ref 3.9–12.7)

## 2019-04-23 PROCEDURE — 36415 COLL VENOUS BLD VENIPUNCTURE: CPT

## 2019-04-23 PROCEDURE — 85025 COMPLETE CBC W/AUTO DIFF WBC: CPT

## 2019-04-23 PROCEDURE — 80053 COMPREHEN METABOLIC PANEL: CPT

## 2019-04-23 PROCEDURE — 82728 ASSAY OF FERRITIN: CPT

## 2019-04-24 ENCOUNTER — TELEPHONE (OUTPATIENT)
Dept: HEPATOLOGY | Facility: CLINIC | Age: 54
End: 2019-04-24

## 2019-04-24 LAB — FERRITIN SERPL-MCNC: 168 NG/ML (ref 20–300)

## 2019-04-24 NOTE — TELEPHONE ENCOUNTER
Called patient informed her of her ferritin levels. Patient understood. Also told her to continue her phlebotomy and labs. She understood. She said she don't need to schedule it. All she does it walk in and get it done.

## 2019-04-24 NOTE — TELEPHONE ENCOUNTER
Please advise pt that ferritin remains elevated but is improving, now down to 168. Continue phlebotomy Q 4 weeks for now x 4 with labs for CBC, Ferritin, CMP. Please ensure these are arranged.

## 2019-05-09 ENCOUNTER — LAB VISIT (OUTPATIENT)
Dept: LAB | Facility: HOSPITAL | Age: 54
End: 2019-05-09
Attending: NURSE PRACTITIONER
Payer: COMMERCIAL

## 2019-05-09 DIAGNOSIS — E83.110 HEREDITARY HEMOCHROMATOSIS: ICD-10-CM

## 2019-05-09 LAB
ALBUMIN SERPL BCP-MCNC: 3.8 G/DL (ref 3.5–5.2)
ALP SERPL-CCNC: 65 U/L (ref 55–135)
ALT SERPL W/O P-5'-P-CCNC: 19 U/L (ref 10–44)
ANION GAP SERPL CALC-SCNC: 8 MMOL/L (ref 8–16)
AST SERPL-CCNC: 19 U/L (ref 10–40)
BASOPHILS # BLD AUTO: 0.01 K/UL (ref 0–0.2)
BASOPHILS NFR BLD: 0.2 % (ref 0–1.9)
BILIRUB SERPL-MCNC: 0.3 MG/DL (ref 0.1–1)
BUN SERPL-MCNC: 20 MG/DL (ref 6–20)
CALCIUM SERPL-MCNC: 8.9 MG/DL (ref 8.7–10.5)
CHLORIDE SERPL-SCNC: 109 MMOL/L (ref 95–110)
CO2 SERPL-SCNC: 25 MMOL/L (ref 23–29)
CREAT SERPL-MCNC: 0.8 MG/DL (ref 0.5–1.4)
DIFFERENTIAL METHOD: ABNORMAL
EOSINOPHIL # BLD AUTO: 0.1 K/UL (ref 0–0.5)
EOSINOPHIL NFR BLD: 1.7 % (ref 0–8)
ERYTHROCYTE [DISTWIDTH] IN BLOOD BY AUTOMATED COUNT: 12.3 % (ref 11.5–14.5)
EST. GFR  (AFRICAN AMERICAN): >60 ML/MIN/1.73 M^2
EST. GFR  (NON AFRICAN AMERICAN): >60 ML/MIN/1.73 M^2
FERRITIN SERPL-MCNC: 180 NG/ML (ref 20–300)
GLUCOSE SERPL-MCNC: 97 MG/DL (ref 70–110)
HCT VFR BLD AUTO: 38.4 % (ref 37–48.5)
HGB BLD-MCNC: 12.5 G/DL (ref 12–16)
LYMPHOCYTES # BLD AUTO: 1.2 K/UL (ref 1–4.8)
LYMPHOCYTES NFR BLD: 26.3 % (ref 18–48)
MCH RBC QN AUTO: 31.9 PG (ref 27–31)
MCHC RBC AUTO-ENTMCNC: 32.6 G/DL (ref 32–36)
MCV RBC AUTO: 98 FL (ref 82–98)
MONOCYTES # BLD AUTO: 0.5 K/UL (ref 0.3–1)
MONOCYTES NFR BLD: 11.3 % (ref 4–15)
NEUTROPHILS # BLD AUTO: 2.8 K/UL (ref 1.8–7.7)
NEUTROPHILS NFR BLD: 60.5 % (ref 38–73)
PLATELET # BLD AUTO: 174 K/UL (ref 150–350)
PMV BLD AUTO: 10.9 FL (ref 9.2–12.9)
POTASSIUM SERPL-SCNC: 4.3 MMOL/L (ref 3.5–5.1)
PROT SERPL-MCNC: 7.3 G/DL (ref 6–8.4)
RBC # BLD AUTO: 3.92 M/UL (ref 4–5.4)
SODIUM SERPL-SCNC: 142 MMOL/L (ref 136–145)
WBC # BLD AUTO: 4.67 K/UL (ref 3.9–12.7)

## 2019-05-09 PROCEDURE — 80053 COMPREHEN METABOLIC PANEL: CPT

## 2019-05-09 PROCEDURE — 85025 COMPLETE CBC W/AUTO DIFF WBC: CPT

## 2019-05-09 PROCEDURE — 36415 COLL VENOUS BLD VENIPUNCTURE: CPT

## 2019-05-09 PROCEDURE — 82728 ASSAY OF FERRITIN: CPT

## 2019-06-11 ENCOUNTER — TELEPHONE (OUTPATIENT)
Dept: INTERNAL MEDICINE | Facility: CLINIC | Age: 54
End: 2019-06-11

## 2019-06-11 ENCOUNTER — LAB VISIT (OUTPATIENT)
Dept: LAB | Facility: HOSPITAL | Age: 54
End: 2019-06-11
Attending: INTERNAL MEDICINE
Payer: COMMERCIAL

## 2019-06-11 DIAGNOSIS — E78.5 HYPERLIPIDEMIA, UNSPECIFIED HYPERLIPIDEMIA TYPE: ICD-10-CM

## 2019-06-11 DIAGNOSIS — E83.110 HEREDITARY HEMOCHROMATOSIS: ICD-10-CM

## 2019-06-11 DIAGNOSIS — E78.5 HYPERLIPIDEMIA, UNSPECIFIED HYPERLIPIDEMIA TYPE: Primary | ICD-10-CM

## 2019-06-11 LAB
ALBUMIN SERPL BCP-MCNC: 3.6 G/DL (ref 3.5–5.2)
ALP SERPL-CCNC: 73 U/L (ref 55–135)
ALT SERPL W/O P-5'-P-CCNC: 17 U/L (ref 10–44)
ANION GAP SERPL CALC-SCNC: 9 MMOL/L (ref 8–16)
AST SERPL-CCNC: 16 U/L (ref 10–40)
BASOPHILS # BLD AUTO: 0.01 K/UL (ref 0–0.2)
BASOPHILS NFR BLD: 0.3 % (ref 0–1.9)
BILIRUB SERPL-MCNC: 0.4 MG/DL (ref 0.1–1)
BUN SERPL-MCNC: 22 MG/DL (ref 6–20)
CALCIUM SERPL-MCNC: 9.6 MG/DL (ref 8.7–10.5)
CHLORIDE SERPL-SCNC: 107 MMOL/L (ref 95–110)
CHOLEST SERPL-MCNC: 216 MG/DL (ref 120–199)
CHOLEST/HDLC SERPL: 4.7 {RATIO} (ref 2–5)
CO2 SERPL-SCNC: 25 MMOL/L (ref 23–29)
CREAT SERPL-MCNC: 0.8 MG/DL (ref 0.5–1.4)
DIFFERENTIAL METHOD: ABNORMAL
EOSINOPHIL # BLD AUTO: 0.1 K/UL (ref 0–0.5)
EOSINOPHIL NFR BLD: 2.6 % (ref 0–8)
ERYTHROCYTE [DISTWIDTH] IN BLOOD BY AUTOMATED COUNT: 11.7 % (ref 11.5–14.5)
EST. GFR  (AFRICAN AMERICAN): >60 ML/MIN/1.73 M^2
EST. GFR  (NON AFRICAN AMERICAN): >60 ML/MIN/1.73 M^2
FERRITIN SERPL-MCNC: 123 NG/ML (ref 20–300)
GLUCOSE SERPL-MCNC: 94 MG/DL (ref 70–110)
HCT VFR BLD AUTO: 38.6 % (ref 37–48.5)
HDLC SERPL-MCNC: 46 MG/DL (ref 40–75)
HDLC SERPL: 21.3 % (ref 20–50)
HGB BLD-MCNC: 13 G/DL (ref 12–16)
LDLC SERPL CALC-MCNC: 121 MG/DL (ref 63–159)
LYMPHOCYTES # BLD AUTO: 1 K/UL (ref 1–4.8)
LYMPHOCYTES NFR BLD: 25.6 % (ref 18–48)
MCH RBC QN AUTO: 32.1 PG (ref 27–31)
MCHC RBC AUTO-ENTMCNC: 33.7 G/DL (ref 32–36)
MCV RBC AUTO: 95 FL (ref 82–98)
MONOCYTES # BLD AUTO: 0.4 K/UL (ref 0.3–1)
MONOCYTES NFR BLD: 10.4 % (ref 4–15)
NEUTROPHILS # BLD AUTO: 2.3 K/UL (ref 1.8–7.7)
NEUTROPHILS NFR BLD: 61.1 % (ref 38–73)
NONHDLC SERPL-MCNC: 170 MG/DL
PLATELET # BLD AUTO: 181 K/UL (ref 150–350)
PMV BLD AUTO: 10.6 FL (ref 9.2–12.9)
POTASSIUM SERPL-SCNC: 4.3 MMOL/L (ref 3.5–5.1)
PROT SERPL-MCNC: 7.2 G/DL (ref 6–8.4)
RBC # BLD AUTO: 4.05 M/UL (ref 4–5.4)
SODIUM SERPL-SCNC: 141 MMOL/L (ref 136–145)
TRIGL SERPL-MCNC: 245 MG/DL (ref 30–150)
WBC # BLD AUTO: 3.83 K/UL (ref 3.9–12.7)

## 2019-06-11 PROCEDURE — 80053 COMPREHEN METABOLIC PANEL: CPT

## 2019-06-11 PROCEDURE — 80061 LIPID PANEL: CPT

## 2019-06-11 PROCEDURE — 82728 ASSAY OF FERRITIN: CPT

## 2019-06-11 PROCEDURE — 85025 COMPLETE CBC W/AUTO DIFF WBC: CPT

## 2019-06-11 PROCEDURE — 36415 COLL VENOUS BLD VENIPUNCTURE: CPT

## 2019-06-11 NOTE — TELEPHONE ENCOUNTER
Pt would like orders placed for routine labs. She is getting labs at [facility] today for another dr, but would like to get it all done today.

## 2019-06-11 NOTE — TELEPHONE ENCOUNTER
I just added a lipid panel. She had a CMP and CBC 4 weeks ago that looked fine so we don't need another now.

## 2019-06-12 ENCOUNTER — TELEPHONE (OUTPATIENT)
Dept: HEPATOLOGY | Facility: CLINIC | Age: 54
End: 2019-06-12

## 2019-06-12 DIAGNOSIS — E83.110 HEREDITARY HEMOCHROMATOSIS: Primary | ICD-10-CM

## 2019-06-12 NOTE — TELEPHONE ENCOUNTER
Please advise pt that ferritin remains elevated but is improving, now down to 123. Continue phlebotomy Q 4 weeks for now x 4 with labs for CBC, Ferritin, CMP.    Please ensure labs are arranged.    Also over due for f/u, schedule u/s and Fibroscan same day as f/u appt with me.

## 2019-06-14 NOTE — TELEPHONE ENCOUNTER
MA called patient to arrange her phlebotomy. She is unable to reached left her VM to please give us a callback.

## 2019-06-17 ENCOUNTER — OFFICE VISIT (OUTPATIENT)
Dept: INTERNAL MEDICINE | Facility: CLINIC | Age: 54
End: 2019-06-17
Payer: COMMERCIAL

## 2019-06-17 VITALS
BODY MASS INDEX: 34.77 KG/M2 | SYSTOLIC BLOOD PRESSURE: 100 MMHG | HEIGHT: 62 IN | HEART RATE: 78 BPM | OXYGEN SATURATION: 98 % | WEIGHT: 188.94 LBS | DIASTOLIC BLOOD PRESSURE: 68 MMHG

## 2019-06-17 DIAGNOSIS — E78.5 HYPERLIPIDEMIA, UNSPECIFIED HYPERLIPIDEMIA TYPE: Primary | ICD-10-CM

## 2019-06-17 DIAGNOSIS — Z85.3 HX OF BREAST CANCER: ICD-10-CM

## 2019-06-17 DIAGNOSIS — G20.A1 PARKINSON DISEASE: ICD-10-CM

## 2019-06-17 DIAGNOSIS — E83.110 HEREDITARY HEMOCHROMATOSIS: ICD-10-CM

## 2019-06-17 DIAGNOSIS — M25.561 ACUTE PAIN OF RIGHT KNEE: ICD-10-CM

## 2019-06-17 DIAGNOSIS — E66.9 OBESITY (BMI 30-39.9): ICD-10-CM

## 2019-06-17 DIAGNOSIS — F41.9 ANXIETY: ICD-10-CM

## 2019-06-17 PROCEDURE — 96372 PR INJECTION,THERAP/PROPH/DIAG2ST, IM OR SUBCUT: ICD-10-PCS | Mod: S$GLB,,, | Performed by: INTERNAL MEDICINE

## 2019-06-17 PROCEDURE — 99214 PR OFFICE/OUTPT VISIT, EST, LEVL IV, 30-39 MIN: ICD-10-PCS | Mod: 25,S$GLB,, | Performed by: INTERNAL MEDICINE

## 2019-06-17 PROCEDURE — 99999 PR PBB SHADOW E&M-EST. PATIENT-LVL III: ICD-10-PCS | Mod: PBBFAC,,, | Performed by: INTERNAL MEDICINE

## 2019-06-17 PROCEDURE — 99214 OFFICE O/P EST MOD 30 MIN: CPT | Mod: 25,S$GLB,, | Performed by: INTERNAL MEDICINE

## 2019-06-17 PROCEDURE — 3008F BODY MASS INDEX DOCD: CPT | Mod: CPTII,S$GLB,, | Performed by: INTERNAL MEDICINE

## 2019-06-17 PROCEDURE — 99999 PR PBB SHADOW E&M-EST. PATIENT-LVL III: CPT | Mod: PBBFAC,,, | Performed by: INTERNAL MEDICINE

## 2019-06-17 PROCEDURE — 3008F PR BODY MASS INDEX (BMI) DOCUMENTED: ICD-10-PCS | Mod: CPTII,S$GLB,, | Performed by: INTERNAL MEDICINE

## 2019-06-17 PROCEDURE — 96372 THER/PROPH/DIAG INJ SC/IM: CPT | Mod: S$GLB,,, | Performed by: INTERNAL MEDICINE

## 2019-06-17 RX ORDER — METHYLPREDNISOLONE ACETATE 80 MG/ML
80 INJECTION, SUSPENSION INTRA-ARTICULAR; INTRALESIONAL; INTRAMUSCULAR; SOFT TISSUE
Status: COMPLETED | OUTPATIENT
Start: 2019-06-17 | End: 2019-06-17

## 2019-06-17 RX ORDER — ALPRAZOLAM 0.5 MG/1
0.5 TABLET ORAL NIGHTLY PRN
Qty: 30 TABLET | Refills: 0 | Status: SHIPPED | OUTPATIENT
Start: 2019-06-17 | End: 2019-10-25 | Stop reason: SDUPTHER

## 2019-06-17 RX ADMIN — METHYLPREDNISOLONE ACETATE 80 MG: 80 INJECTION, SUSPENSION INTRA-ARTICULAR; INTRALESIONAL; INTRAMUSCULAR; SOFT TISSUE at 03:06

## 2019-06-17 NOTE — PROGRESS NOTES
Subjective:       Patient ID: Rachel Leal is a 54 y.o. female.    Chief Complaint: Follow-up      HPI:  Patient is known to me and presents for follow up DNA positive hemochromotosis, h/o breast cancer and anxiety.  Labs from 6/11/19 personally reviewed and discussed with the patient today.      Today she reports right knee pain. Has had off an on pain for months but for last 2 weeks pain is constant. Pain is aching and throbbing. Worse with walk  Also numbness and tingling across posterior left shoulder. Worse with driving.  No injuries. No heavy lifting.  She has tried OTC Aleve with mild relief of sx.     GERD: at last visit started nexium. Taking every morning. Her sx are improved since last visit. She does not notice sx are worse with eating. No dysphagia/odynophagia     H/o breast cancer with metastasis to bone (stage VI at diagnosis): dx 2013. Follows with Dr. Gerardo--he is retiring but will establish with is replacement. On arimidex and xgeva; s/p left mastectomy. Doing CT scans with oncology, no MMG. she did follow with her oncologist recently. Did PET and no mets     Anxiety: Effexor and Xanax currently. Mood is well controlled. Denies depressed mood. Denies SI. Using Xanax very sparingly     HLD: was taking omega 3 OTC and lipitor- both caused sdie effects (SOB and hair loss respectively). Repeat cholesterol off meds very elevated again;  so started pravastatin. LDL trended down and overall controlled. TG still a little elevated     Parkinson's: she is taking sinement. She was given neuopro but resulted in cellulitis that responded well to antibx given at urgent care. Stopped the patch and hasn't had any further reactions. Feels she is doing well on current treatment     Hemochromotosis: diagnosed with positive DNA analysis but no sympotms. Ferritin 400s. She does follow with hepatology. Last US 12/2015 showed fatty infiltration otherwise normal. Last AST/ALT normal, normal bilirubin. No  history of blood sugar problems. Had recent bx with hepatology as well.      Past Medical History:   Diagnosis Date    Cancer     breast stage iv, remission 2014    Hereditary hemochromatosis 2015    History of TMJ syndrome     Hyperlipidemia     Hypertension     Hypoglycemia     Parkinson disease        Family History   Problem Relation Age of Onset    Colon cancer Mother     Diabetes Father     Heart disease Father     Hemochromatosis Brother     Cirrhosis Brother         hemochromotosis    Breast cancer Neg Hx     Ovarian cancer Neg Hx        Social History     Socioeconomic History    Marital status:      Spouse name: Not on file    Number of children: Not on file    Years of education: Not on file    Highest education level: Not on file   Occupational History    Not on file   Social Needs    Financial resource strain: Not on file    Food insecurity:     Worry: Not on file     Inability: Not on file    Transportation needs:     Medical: Not on file     Non-medical: Not on file   Tobacco Use    Smoking status: Former Smoker     Packs/day: 0.25     Types: Cigarettes     Last attempt to quit: 2013     Years since quittin.7    Smokeless tobacco: Never Used   Substance and Sexual Activity    Alcohol use: Yes     Alcohol/week: 6.0 oz     Types: 12 Standard drinks or equivalent per week     Comment: beer, 6 pack twice weekly    Drug use: No    Sexual activity: Yes     Partners: Male     Birth control/protection: Surgical     Comment: .   Lifestyle    Physical activity:     Days per week: Not on file     Minutes per session: Not on file    Stress: Not on file   Relationships    Social connections:     Talks on phone: Not on file     Gets together: Not on file     Attends [employer] service: Not on file     Active member of club or organization: Not on file     Attends meetings of clubs or organizations: Not on file     Relationship status: Not on file   Other  Topics Concern    Not on file   Social History Narrative    Not on file       Review of Systems   Constitutional: Negative for activity change, fatigue, fever and unexpected weight change.   HENT: Negative for congestion, ear pain, hearing loss, rhinorrhea and sore throat.    Eyes: Negative for redness and visual disturbance.   Respiratory: Negative for cough, shortness of breath and wheezing.    Cardiovascular: Negative for chest pain, palpitations and leg swelling.   Gastrointestinal: Negative for abdominal pain, constipation, diarrhea, nausea and vomiting.   Genitourinary: Negative for dysuria, frequency, pelvic pain and urgency.   Musculoskeletal: Positive for arthralgias (right knee pain). Negative for back pain, joint swelling and neck pain.   Skin: Negative for color change, rash and wound.   Neurological: Negative for dizziness, tremors, weakness, light-headedness and headaches.         Objective:      Physical Exam   Constitutional: She is oriented to person, place, and time. She appears well-developed and well-nourished. No distress.   HENT:   Head: Normocephalic and atraumatic.   Right Ear: External ear normal.   Left Ear: External ear normal.   Eyes: Pupils are equal, round, and reactive to light. Conjunctivae and EOM are normal. Right eye exhibits no discharge. Left eye exhibits no discharge.   Neck: Neck supple. No tracheal deviation present.   Cardiovascular: Normal rate and regular rhythm.   No murmur heard.  Pulmonary/Chest: Effort normal and breath sounds normal. No respiratory distress. She has no wheezes. She has no rales.   Abdominal: Soft. Bowel sounds are normal. She exhibits no distension. There is no tenderness.   Musculoskeletal: She exhibits tenderness (right medial joint line).   Neurological: She is alert and oriented to person, place, and time. No cranial nerve deficit.   Skin: Skin is warm and dry.   Psychiatric: She has a normal mood and affect. Her behavior is normal.   Vitals  reviewed.      Assessment:       1. Hyperlipidemia, unspecified hyperlipidemia type    2. Parkinson disease    3. Hx of breast cancer    4. Hereditary hemochromatosis    5. Obesity (BMI 30-39.9)    6. Acute pain of right knee    7. Anxiety        Plan:       Rachel was seen today for follow-up.    Diagnoses and all orders for this visit:    Hyperlipidemia, unspecified hyperlipidemia type  -     CBC auto differential; Future  -     Comprehensive metabolic panel; Future  -     TSH; Future  -     Lipid panel; Future  Chronic controlled  TG elevated--discussed adding meds but will trial diet and exercise and weight loss first    Parkinson disease  Chronic stable  Cont meds same dose per neurology    Hx of breast cancer  Follows with oncology who is doing routine screening  No MMG    Hereditary hemochromatosis  Chronic stable  Follows with hepatology    Obesity (BMI 30-39.9)  -     CBC auto differential; Future  -     Comprehensive metabolic panel; Future  -     TSH; Future  -     Lipid panel; Future  Diet and exercise    Acute pain of right knee  -     methylPREDNISolone acetate injection 80 mg  New problem  Likely OA  IM steroids today  Stretching  Heat  If no improvement can do imaging    Anxiety  -     CBC auto differential; Future  -     Comprehensive metabolic panel; Future  -     TSH; Future  -     Lipid panel; Future  -     ALPRAZolam (XANAX) 0.5 MG tablet; Take 1 tablet (0.5 mg total) by mouth nightly as needed.  Chronic controlled  meds refilled   reviewed         Health Maintenance:  -CRC: age 49 (mom and grandmother with CRC). Had polyps, unsure what kind. Done 8/2016  -PAP: s/p hysterectomy  -MMG: s/p left mastectomy, doing CT for screening  -tobacco: reports occasional use  -Vaccines: flu declines    RTC 6 months with labs and PRN

## 2019-06-17 NOTE — TELEPHONE ENCOUNTER
MA called patient to schedule her appt she said she goes to [facility] for her phlebotomy she will call me back to schedule her follow up visit.

## 2019-06-18 ENCOUNTER — TELEPHONE (OUTPATIENT)
Dept: HEPATOLOGY | Facility: CLINIC | Age: 54
End: 2019-06-18

## 2019-06-18 ENCOUNTER — TELEPHONE (OUTPATIENT)
Dept: INTERNAL MEDICINE | Facility: CLINIC | Age: 54
End: 2019-06-18

## 2019-06-18 NOTE — TELEPHONE ENCOUNTER
"----- Message from Francheska Morin sent at 2019 10:20 AM CDT -----  Contact: self   Rachel Leal  MRN: [id number]  : 1965  PCP: Cata Leal  Home Phone      [phone]  Work Phone      Not on file.  Mobile          [phone]    MESSAGE:   Pt c/o "crusty, and redness" to left eye which began in the early morning hours today. She stated she was seen in the office on 19 and would prefer not to come in for an appointment unless she has to. The pt requested medication for symptoms.     Phone # [phone]    [id number]  [address]  "

## 2019-06-18 NOTE — TELEPHONE ENCOUNTER
Patient called and message relayed about doing Phlebotomies monthly at [facility]. The patient stated I usually go on the 1st week of the month, a Mon, Tues or Wed and get it done.    You are also do for the f/u to see Bernadette.  U/S and labs scheduled for Thurs 7/18/19 at [facility].  Office Visit and Fibroscan scheduled for Tuesday 7/23/19.  All appt letters placed in the mail.

## 2019-06-18 NOTE — TELEPHONE ENCOUNTER
Without seeing her in clinic it is hard to know for sure but this sounds like a viral conjunctivites. Warm compresses to eye, keeping the eye clean is typically all that is needed. I wouldn't know if antibiotics are needed without seeing her but usually we don't need to do antibx drops. If she wants to given it a day or two and if not better come see me that is fine.

## 2019-07-17 ENCOUNTER — LAB VISIT (OUTPATIENT)
Dept: LAB | Facility: HOSPITAL | Age: 54
End: 2019-07-17
Attending: NURSE PRACTITIONER
Payer: COMMERCIAL

## 2019-07-17 DIAGNOSIS — E83.110 HEREDITARY HEMOCHROMATOSIS: ICD-10-CM

## 2019-07-17 LAB
ALBUMIN SERPL BCP-MCNC: 3.9 G/DL (ref 3.5–5.2)
ALP SERPL-CCNC: 71 U/L (ref 55–135)
ALT SERPL W/O P-5'-P-CCNC: 8 U/L (ref 10–44)
ANION GAP SERPL CALC-SCNC: 10 MMOL/L (ref 8–16)
AST SERPL-CCNC: 14 U/L (ref 10–40)
BASOPHILS # BLD AUTO: 0.03 K/UL (ref 0–0.2)
BASOPHILS NFR BLD: 0.6 % (ref 0–1.9)
BILIRUB SERPL-MCNC: 0.2 MG/DL (ref 0.1–1)
BUN SERPL-MCNC: 27 MG/DL (ref 6–20)
CALCIUM SERPL-MCNC: 9.1 MG/DL (ref 8.7–10.5)
CHLORIDE SERPL-SCNC: 106 MMOL/L (ref 95–110)
CO2 SERPL-SCNC: 24 MMOL/L (ref 23–29)
CREAT SERPL-MCNC: 0.8 MG/DL (ref 0.5–1.4)
DIFFERENTIAL METHOD: ABNORMAL
EOSINOPHIL # BLD AUTO: 0.1 K/UL (ref 0–0.5)
EOSINOPHIL NFR BLD: 2 % (ref 0–8)
ERYTHROCYTE [DISTWIDTH] IN BLOOD BY AUTOMATED COUNT: 11.8 % (ref 11.5–14.5)
EST. GFR  (AFRICAN AMERICAN): >60 ML/MIN/1.73 M^2
EST. GFR  (NON AFRICAN AMERICAN): >60 ML/MIN/1.73 M^2
FERRITIN SERPL-MCNC: 101 NG/ML (ref 20–300)
GLUCOSE SERPL-MCNC: 93 MG/DL (ref 70–110)
HCT VFR BLD AUTO: 39.5 % (ref 37–48.5)
HGB BLD-MCNC: 13.3 G/DL (ref 12–16)
IMM GRANULOCYTES # BLD AUTO: 0.02 K/UL (ref 0–0.04)
IMM GRANULOCYTES NFR BLD AUTO: 0.4 % (ref 0–0.5)
LYMPHOCYTES # BLD AUTO: 1.2 K/UL (ref 1–4.8)
LYMPHOCYTES NFR BLD: 23.7 % (ref 18–48)
MCH RBC QN AUTO: 31.8 PG (ref 27–31)
MCHC RBC AUTO-ENTMCNC: 33.7 G/DL (ref 32–36)
MCV RBC AUTO: 95 FL (ref 82–98)
MONOCYTES # BLD AUTO: 0.6 K/UL (ref 0.3–1)
MONOCYTES NFR BLD: 10.9 % (ref 4–15)
NEUTROPHILS # BLD AUTO: 3.2 K/UL (ref 1.8–7.7)
NEUTROPHILS NFR BLD: 62.4 % (ref 38–73)
NRBC BLD-RTO: 0 /100 WBC
PLATELET # BLD AUTO: 209 K/UL (ref 150–350)
PMV BLD AUTO: 10.6 FL (ref 9.2–12.9)
POTASSIUM SERPL-SCNC: 4.4 MMOL/L (ref 3.5–5.1)
PROT SERPL-MCNC: 7.5 G/DL (ref 6–8.4)
RBC # BLD AUTO: 4.18 M/UL (ref 4–5.4)
SODIUM SERPL-SCNC: 140 MMOL/L (ref 136–145)
WBC # BLD AUTO: 5.06 K/UL (ref 3.9–12.7)

## 2019-07-17 PROCEDURE — 36415 COLL VENOUS BLD VENIPUNCTURE: CPT

## 2019-07-17 PROCEDURE — 82728 ASSAY OF FERRITIN: CPT

## 2019-07-17 PROCEDURE — 80053 COMPREHEN METABOLIC PANEL: CPT

## 2019-07-17 PROCEDURE — 85025 COMPLETE CBC W/AUTO DIFF WBC: CPT

## 2019-07-18 ENCOUNTER — HOSPITAL ENCOUNTER (OUTPATIENT)
Dept: RADIOLOGY | Facility: HOSPITAL | Age: 54
Discharge: HOME | End: 2019-07-18
Attending: NURSE PRACTITIONER
Payer: COMMERCIAL

## 2019-07-18 DIAGNOSIS — E83.110 HEREDITARY HEMOCHROMATOSIS: ICD-10-CM

## 2019-07-18 PROCEDURE — 76700 US EXAM ABDOM COMPLETE: CPT | Mod: TC

## 2019-07-18 PROCEDURE — 76700 US ABDOMEN COMPLETE: ICD-10-PCS | Mod: 26,,, | Performed by: RADIOLOGY

## 2019-07-18 PROCEDURE — 76700 US EXAM ABDOM COMPLETE: CPT | Mod: 26,,, | Performed by: RADIOLOGY

## 2019-09-05 ENCOUNTER — LAB VISIT (OUTPATIENT)
Dept: LAB | Facility: HOSPITAL | Age: 54
End: 2019-09-05
Attending: NURSE PRACTITIONER
Payer: COMMERCIAL

## 2019-09-05 DIAGNOSIS — E83.110 HEREDITARY HEMOCHROMATOSIS: ICD-10-CM

## 2019-09-05 LAB
ALBUMIN SERPL BCP-MCNC: 3.8 G/DL (ref 3.5–5.2)
ALP SERPL-CCNC: 68 U/L (ref 55–135)
ALT SERPL W/O P-5'-P-CCNC: 19 U/L (ref 10–44)
ANION GAP SERPL CALC-SCNC: 9 MMOL/L (ref 8–16)
AST SERPL-CCNC: 17 U/L (ref 10–40)
BASOPHILS # BLD AUTO: 0.02 K/UL (ref 0–0.2)
BASOPHILS NFR BLD: 0.4 % (ref 0–1.9)
BILIRUB SERPL-MCNC: 0.2 MG/DL (ref 0.1–1)
BUN SERPL-MCNC: 29 MG/DL (ref 6–20)
CALCIUM SERPL-MCNC: 8.7 MG/DL (ref 8.7–10.5)
CHLORIDE SERPL-SCNC: 108 MMOL/L (ref 95–110)
CO2 SERPL-SCNC: 22 MMOL/L (ref 23–29)
CREAT SERPL-MCNC: 0.8 MG/DL (ref 0.5–1.4)
DIFFERENTIAL METHOD: ABNORMAL
EOSINOPHIL # BLD AUTO: 0.1 K/UL (ref 0–0.5)
EOSINOPHIL NFR BLD: 1.6 % (ref 0–8)
ERYTHROCYTE [DISTWIDTH] IN BLOOD BY AUTOMATED COUNT: 11.6 % (ref 11.5–14.5)
EST. GFR  (AFRICAN AMERICAN): >60 ML/MIN/1.73 M^2
EST. GFR  (NON AFRICAN AMERICAN): >60 ML/MIN/1.73 M^2
FERRITIN SERPL-MCNC: 99 NG/ML (ref 20–300)
GLUCOSE SERPL-MCNC: 95 MG/DL (ref 70–110)
HCT VFR BLD AUTO: 39.3 % (ref 37–48.5)
HGB BLD-MCNC: 12.8 G/DL (ref 12–16)
IMM GRANULOCYTES # BLD AUTO: 0.01 K/UL (ref 0–0.04)
IMM GRANULOCYTES NFR BLD AUTO: 0.2 % (ref 0–0.5)
LYMPHOCYTES # BLD AUTO: 1.1 K/UL (ref 1–4.8)
LYMPHOCYTES NFR BLD: 19.4 % (ref 18–48)
MCH RBC QN AUTO: 31.1 PG (ref 27–31)
MCHC RBC AUTO-ENTMCNC: 32.6 G/DL (ref 32–36)
MCV RBC AUTO: 96 FL (ref 82–98)
MONOCYTES # BLD AUTO: 0.5 K/UL (ref 0.3–1)
MONOCYTES NFR BLD: 8.5 % (ref 4–15)
NEUTROPHILS # BLD AUTO: 3.9 K/UL (ref 1.8–7.7)
NEUTROPHILS NFR BLD: 69.9 % (ref 38–73)
NRBC BLD-RTO: 0 /100 WBC
PLATELET # BLD AUTO: 166 K/UL (ref 150–350)
PMV BLD AUTO: 11.2 FL (ref 9.2–12.9)
POTASSIUM SERPL-SCNC: 4.6 MMOL/L (ref 3.5–5.1)
PROT SERPL-MCNC: 7.2 G/DL (ref 6–8.4)
RBC # BLD AUTO: 4.11 M/UL (ref 4–5.4)
SODIUM SERPL-SCNC: 139 MMOL/L (ref 136–145)
WBC # BLD AUTO: 5.52 K/UL (ref 3.9–12.7)

## 2019-09-05 PROCEDURE — 82728 ASSAY OF FERRITIN: CPT

## 2019-09-05 PROCEDURE — 36415 COLL VENOUS BLD VENIPUNCTURE: CPT

## 2019-09-05 PROCEDURE — 80053 COMPREHEN METABOLIC PANEL: CPT

## 2019-09-05 PROCEDURE — 85025 COMPLETE CBC W/AUTO DIFF WBC: CPT

## 2019-09-06 ENCOUNTER — TELEPHONE (OUTPATIENT)
Dept: HEPATOLOGY | Facility: CLINIC | Age: 54
End: 2019-09-06

## 2019-09-06 NOTE — TELEPHONE ENCOUNTER
Called patient and let her know her results. Scheduled patient for office visit with Chetna and fibroscan

## 2019-09-06 NOTE — TELEPHONE ENCOUNTER
Please call pt:    Inform pt:  1. Ferritin 99, continue phlebotomy Q 4 weeks  2. Liver tests remain normal  3. She's overdue for f/u appt    Please schedule:  1. Phlebotomy if not already scheduled with labs for ferritin, CBC, CMP with each phlebotomy  2. F/u appt with [facility] with Fibroscan same day in 3 months

## 2019-09-10 NOTE — PROGRESS NOTES
[facility] Established Patient Visit    Reason for Visit:  hemochromatosis    PCP: Cata Leal    HPI:  This is a 54 y.o. female with PMH noted below, here for follow up of hereditary hemochromatosis (homozygote for [id number])    Her brother is homozygote for C282Y    Last seen in hepatology clinic 10/2018 by ANABEL Santos NP    Did have a history of more significant alcohol intake (12 pack of beer intermittently, no daily alcohol use) but significantly decreased intake in ~2018 to currently 6 pack of beer every month or less     Interval HPI: Presents alone. Doing well, no complaints. Has lost 15 lbs in the past 2 months. Personal weight goal of ~165 lbs    Abd U/S done 7/18/19 showed fatty liver. No splenomegaly    Ferritin and phlebotomy history:   1/8/18 was 494, patient reported drinking regularly, pt instructed to significantly decrease alcohol intake  1/26/18 was 624, so phlebotomy q4 weeks was initiated. She completed phlebotomy 2/19/18 and 3/26/18 (ferritin 3/22/18 = 397, 4/4/18 = 381). H/H then became lower limit of normal, 12.3/37.0.    Ferritin 9/5/19 was 99, phlebotomy approx every 4 weeks, last ~5 weeks ago    Lab Results   Component Value Date    ALT 19 09/05/2019    AST 17 09/05/2019    ALKPHOS 68 09/05/2019    BILITOT 0.2 09/05/2019    ALBUMIN 3.8 09/05/2019    INR 0.9 11/29/2017     09/05/2019     Previous serologic w/u negative for iral hepatitis B and C    Risk factors for fatty liver include alcohol use (althought decreased in past year), Risk factors for NAFLD include obesity, HLD  Diet : improving, limiting carbs, significant decreased  Exercise : limited due to parkinson's, osteoarthritis  Weight loss of ~15 lbs since August, personal wt loss goal of 165 lbs    Fibroscan in 2017 showed S2, F0-1  Repeat fibroscan 9/10/19 F0-1 (kPA 7.2), S3 steatosis    Biopsy in the past done 12/21/17 showed mild steatosis, macrovesicular 20% and microvesicular 10%. Mild to moderate  siderosis, 2+, within hepatocytes. No fibrosis, no definitive evidence of steatohepatitis. Total iron weight = 6112 mcg/g, hepatic iron index = 2.0.      Immunity to Hep A and B - completed [employer]     Denies jaundice, dark urine, abdominal distention, hematemesis, melena, slowed mentation. No abnormal skin rashes. No generalized joint or muscle pain.     PMHX:  has a past medical history of Cancer, Fatty liver, Hereditary hemochromatosis (2015), History of TMJ syndrome, Hyperlipidemia, Hypertension, Hypoglycemia, Osteoarthritis, and Parkinson disease.    PSHX:  has a past surgical history that includes Breast surgery; Eye surgery;  section; Hand surgery; Hysterectomy; Colonoscopy; Liver biopsy (2017); Tonsillectomy; Mastectomy (Left); Excision of condyloma (N/A, 2019); and Fulguration of anal condyloma (N/A, 2019).    The patient's social and family histories were reviewed by me and updated in the appropriate section of the electronic medical record.    Review of patient's allergies indicates:   Allergen Reactions    Neupro [rotigotine] Dermatitis       Current Outpatient Medications on File Prior to Visit   Medication Sig Dispense Refill    ALPRAZolam (XANAX) 0.5 MG tablet Take 1 tablet (0.5 mg total) by mouth nightly as needed. 30 tablet 0    anastrozole (ARIMIDEX) 1 mg Tab Take 1 mg by mouth once daily.      ascorbic acid, vitamin C, (VITAMIN C) 1000 MG tablet Take 1,000 mg by mouth once daily.      carbidopa-levodopa  mg (SINEMET)  mg per tablet TAKE 1 TABLET BY MOUTH 2 (TWO) TIMES DAILY. 180 tablet 3    COCONUT OIL ORAL Take 2 capsules by mouth once daily.      denosumab (XGEVA) 120 mg/1.7 mL (70 mg/mL) Soln Inject 120 mg into the skin. Every 3 months      esomeprazole (NEXIUM) 20 MG capsule Take 1 capsule (20 mg total) by mouth as needed. 30 capsule 0    losartan-hydrochlorothiazide 50-12.5 mg (HYZAAR) 50-12.5 mg per tablet Take 1 tablet by mouth once daily.  11  "   oxyCODONE-acetaminophen (PERCOCET) 5-325 mg per tablet Take 1-2 tablets by mouth every 4 (four) hours as needed for Pain. 30 tablet 0    pravastatin (PRAVACHOL) 40 MG tablet TAKE 1 TABLET (40 MG TOTAL) BY MOUTH ONCE DAILY. 90 tablet 3    venlafaxine (EFFEXOR-XR) 75 MG 24 hr capsule Take 75 mg by mouth once daily.       vitamin D 1000 units Tab Take 1,000 mg by mouth once daily.        No current facility-administered medications on file prior to visit.        SOCIAL HISTORY:   Social History     Tobacco Use   Smoking Status Former Smoker    Packs/day: 0.25    Types: Cigarettes    Last attempt to quit: 2013    Years since quittin.9   Smokeless Tobacco Never Used       Social History     Substance and Sexual Activity   Alcohol Use Yes    Comment: drinking 6 peers per month, no daily use        Social History     Substance and Sexual Activity   Drug Use No       ROS:   GENERAL: Denies fever, chills, + intentional weight loss, denies fatigue  HEENT: Denies headaches, dizziness, vision/hearing changes  CARDIOVASCULAR: Denies chest pain, palpitations, or edema  RESPIRATORY: Denies dyspnea, cough  GI: Denies abdominal pain, rectal bleeding, nausea, vomiting. No change in bowel pattern or color  : Denies dysuria, hematuria   SKIN: Denies rash, itching   NEURO: Denies confusion, memory loss, or mood changes  PSYCH: Denies depression or anxiety  HEME/LYMPH: Denies easy bruising or bleeding    Objective Findings:    PHYSICAL EXAM:   Friendly White female, in no acute distress; alert and oriented to person, place and time  VITALS: /66 (BP Location: Left arm, Patient Position: Sitting, BP Method: Medium (Automatic))   Pulse 74   Ht 5' 2" (1.575 m)   Wt 81.6 kg (180 lb)   LMP 2012   SpO2 95%   BMI 32.92 kg/m²   HENT: Normocephalic, without obvious abnormality. Oral mucosa pink and moist. Dentition good.  EYES: Sclerae anicteric. No conjunctival pallor.   NECK: Supple. No masses or cervical " adenopathy.  CARDIOVASCULAR: Regular rate and rhythm. No murmurs.  RESPIRATORY: Normal respiratory effort. BBS CTA. No wheezes or crackles.  GI: Soft, non-tender, non-distended. No hepatosplenomegaly. No masses palpable. No ascites.  EXTREMITIES:  No clubbing, cyanosis or edema.  SKIN: Warm and dry. No jaundice. No rashes noted to exposed skin. No telangectasias noted. No palmar erythema.  NEURO:  Normal gait. No asterixis.  PSYCH:  Memory intact. Thought and speech pattern appropriate. Behavior normal. No depression or anxiety noted.    DIAGNOSTIC STUDIES:    ABD. U/S-    Done 19  FINDINGS:  Pancreas: The visualized portions of pancreas appear normal.    Aorta: No aneurysm.    Liver: 15 cm, normal in size. Fatty liver infiltration.  No focal lesions.    Gallbladder: No calculi, wall thickening, or pericholecystic fluid.  Negative sonographic Harris's sign.    Biliary system: 1.9 mm common bile duct.  No intrahepatic ductal dilatation.    Inferior vena cava: Normal in appearance.    Right kidney: 12.3 cm. No hydronephrosis.    Left kidney: 12.1 cm. No hydronephrosis.    Spleen: 8.5 cm.  Normal in size with homogeneous echotexture.    Miscellaneous: No ascites.      Impression       Fatty infiltration of the liver.       LIVER BIOPSY-  Done 17  IRON, LIVER TS:  Iron, Liver TS Reference Value  HIGH 6112 mcg/g dry wt 400-1600  Hepatic Iron Index  HIGH 2.0 mcmol/g/yr <1.0  Results of hepatic iron index between 1.0-1.9 suggest mild, nonspecific iron accumulation as may be seen in  alcoholic liver disease or heterozygous hemochromatosis. Results >1.9 indicate homozygous hemochromatosis or  transfusion-related iron overload. Chronic blood loss or frequent phlebotomy will decrease the hepatic iron index.    FIBROSCAN - done 17  Fibroscan readin.6 KPa  Fibrosis:F 0-1   CAP readin dB/m  Steatosis: :S2     REPEAT 19  Fibroscan readin.3 KPa  Fibrosis:F 0-1   CAP readin dB/m  Steatosis:  :S3       ASSESSMENT:  54 y.o. White female with:  1. Hereditary hemochromatosis  -- homozygote for [id number])  -- ferritin level within goal of , currently undergoing phlebotomy every ~ 4 weeks at [facility]  -- family members diagnosed and/or tested - pt reports all siblings notified   -- fibroscan 9/2019 with F0-1 fibrosis (near F2), + worsening steatosis  -- Abd U/S done 7/18/19 showed fatty liver. No splenomegaly  --- + Immunity to Hep A and B   -- previous serologic w/u negative for iral hepatitis B and C    2. Fatty liver, likely metabolic with some alcohol component  -- Risk factors for fatty liver include alcohol use (althought decreased in past year), Risk factors for NAFLD include obesity, HLD  Diet : improving, limiting carbs, significant decreased  Exercise : limited due to parkinson's, osteoarthritis  Weight loss of ~15 lbs since August, personal wt loss goal of 165 lbs      EDUCATION:    We discussed the manifestations of alcoholic and non-alcoholic fatty liver disease. At this time, there are no FDA approved therapy for non-alcoholic fatty liver disease.  The patient and I discussed the importance of diet, exercise, and weight loss for management of NAFLD. We discussed a low fat, low carb/sugar, high fiber diet and a goal of 30 minutes of exercise 5 times per week    Discussed that fatty liver can progress to steatohepatitis and possibly to cirrhosis, putting one at increased risk for liver cancer, liver failure, and death    Recommend to avoid alcohol consumption. It is know that heavy alcohol use is associated with disease progression among patients with fatty liver disease. Information is unknown at this time of the effects on the liver with alcohol use in moderation.      PLAN:  1. Continue labs every 3 months  2. Repeat US every 1-2 years  3. Recommend no/limited alcohol intake given possible worsening fibrosis and steatosis on fibroscan today  4. Recommend screening for all 1st degree  relatives (screening to include iron studies and HFE mutation analysis)  5. Continue phlebotomy at [facility] every ~4 weeks  6. Weight loss goal of 15-20 lbs  7. Low carb/sugar, high fiber and protein diet  8. Exercise, 5 days per week, 30 minutes per day, as tolerated  9. Recommend good cholesterol, blood pressure, blood sugar levels   10. Follow up in about 5 months (around 2/11/2020). with labs before, fibroscan repeat due 9/2020    Thank you for allowing me to participate in the care of Rachel Johnathan Shah NP-JOHNNY    CC'ed note to:   Cata Leal MD

## 2019-09-11 ENCOUNTER — PROCEDURE VISIT (OUTPATIENT)
Dept: HEPATOLOGY | Facility: CLINIC | Age: 54
End: 2019-09-11
Attending: NURSE PRACTITIONER
Payer: COMMERCIAL

## 2019-09-11 ENCOUNTER — OFFICE VISIT (OUTPATIENT)
Dept: HEPATOLOGY | Facility: CLINIC | Age: 54
End: 2019-09-11
Payer: COMMERCIAL

## 2019-09-11 VITALS
DIASTOLIC BLOOD PRESSURE: 66 MMHG | OXYGEN SATURATION: 95 % | HEIGHT: 62 IN | WEIGHT: 180 LBS | SYSTOLIC BLOOD PRESSURE: 110 MMHG | HEART RATE: 74 BPM | BODY MASS INDEX: 33.13 KG/M2

## 2019-09-11 DIAGNOSIS — E83.110 HEREDITARY HEMOCHROMATOSIS: Primary | ICD-10-CM

## 2019-09-11 DIAGNOSIS — E78.5 HYPERLIPIDEMIA, UNSPECIFIED HYPERLIPIDEMIA TYPE: ICD-10-CM

## 2019-09-11 DIAGNOSIS — K76.0 FATTY LIVER: ICD-10-CM

## 2019-09-11 DIAGNOSIS — E66.9 OBESITY (BMI 30-39.9): ICD-10-CM

## 2019-09-11 DIAGNOSIS — E83.110 HEREDITARY HEMOCHROMATOSIS: ICD-10-CM

## 2019-09-11 PROCEDURE — 99999 PR PBB SHADOW E&M-EST. PATIENT-LVL IV: CPT | Mod: PBBFAC,,, | Performed by: NURSE PRACTITIONER

## 2019-09-11 PROCEDURE — 3008F PR BODY MASS INDEX (BMI) DOCUMENTED: ICD-10-PCS | Mod: CPTII,S$GLB,, | Performed by: NURSE PRACTITIONER

## 2019-09-11 PROCEDURE — 99999 PR PBB SHADOW E&M-EST. PATIENT-LVL IV: ICD-10-PCS | Mod: PBBFAC,,, | Performed by: NURSE PRACTITIONER

## 2019-09-11 PROCEDURE — 99214 PR OFFICE/OUTPT VISIT, EST, LEVL IV, 30-39 MIN: ICD-10-PCS | Mod: S$GLB,,, | Performed by: NURSE PRACTITIONER

## 2019-09-11 PROCEDURE — 99214 OFFICE O/P EST MOD 30 MIN: CPT | Mod: S$GLB,,, | Performed by: NURSE PRACTITIONER

## 2019-09-11 PROCEDURE — 91200 LIVER ELASTOGRAPHY: CPT | Mod: S$GLB,,, | Performed by: NURSE PRACTITIONER

## 2019-09-11 PROCEDURE — 91200 PR LIVER ELASTOGRAPHY W/OUT IMAG W/INTERP & REPORT: ICD-10-PCS | Mod: S$GLB,,, | Performed by: NURSE PRACTITIONER

## 2019-09-11 PROCEDURE — 3008F BODY MASS INDEX DOCD: CPT | Mod: CPTII,S$GLB,, | Performed by: NURSE PRACTITIONER

## 2019-09-11 NOTE — PROCEDURES
Procedures     Fibroscan Procedure     Name: Rachel Leal  Date of Procedure : 2019   :: Chetna Shah NP  Diagnosis: fatty liver/ hemochromatosis    Probe: XL    Fibroscan readin.3 KPa    Fibrosis:F 0-1     CAP readin dB/m    Steatosis: :S3

## 2019-09-11 NOTE — PATIENT INSTRUCTIONS
1. Fibroscan today to look for fat or scar tissue in the liver showed - showed very mild scar tissue in the liver and significant fat in the liver  2. Recommend no alcohol intake (given significant fat in the liver, mild scar tissue and underlying hemochromatosis)  3. Recommend all first degree relatives are screened for hemochromatosis  4. Ultrasound of the liver every 1-2 years  5. Labs in 3 months to check ferritin and liver enzmes  6.  Follow up in 6 months with labs before    There is no FDA approved therapy for non-alcoholic fatty liver disease. Therefore, these things are important:  1. Weight loss goal of 15-20 lbs  2. Low carb/sugar, high fiber and protein diet  3. Exercise, 5 days per week, 30 minutes per day, as tolerated  4. Recommend good cholesterol, blood pressure, blood sugar levels     In some people, fatty liver can progress to steatohepatitis (inflamatory fatty liver) and possibly to cirrhosis, putting one at increased risk for liver cancer, liver failure, and death. Therefore, the lifestyle changes are very important to decrease this risk.

## 2019-10-07 ENCOUNTER — LAB VISIT (OUTPATIENT)
Dept: LAB | Facility: HOSPITAL | Age: 54
End: 2019-10-07
Attending: NURSE PRACTITIONER
Payer: COMMERCIAL

## 2019-10-07 DIAGNOSIS — E83.110 HEREDITARY HEMOCHROMATOSIS: ICD-10-CM

## 2019-10-07 LAB
ALBUMIN SERPL BCP-MCNC: 3.9 G/DL (ref 3.5–5.2)
ALP SERPL-CCNC: 66 U/L (ref 55–135)
ALT SERPL W/O P-5'-P-CCNC: 19 U/L (ref 10–44)
ANION GAP SERPL CALC-SCNC: 9 MMOL/L (ref 8–16)
AST SERPL-CCNC: 18 U/L (ref 10–40)
BASOPHILS # BLD AUTO: 0.02 K/UL (ref 0–0.2)
BASOPHILS NFR BLD: 0.4 % (ref 0–1.9)
BILIRUB SERPL-MCNC: 0.3 MG/DL (ref 0.1–1)
BUN SERPL-MCNC: 20 MG/DL (ref 6–20)
CALCIUM SERPL-MCNC: 9 MG/DL (ref 8.7–10.5)
CHLORIDE SERPL-SCNC: 106 MMOL/L (ref 95–110)
CO2 SERPL-SCNC: 25 MMOL/L (ref 23–29)
CREAT SERPL-MCNC: 0.8 MG/DL (ref 0.5–1.4)
DIFFERENTIAL METHOD: ABNORMAL
EOSINOPHIL # BLD AUTO: 0.1 K/UL (ref 0–0.5)
EOSINOPHIL NFR BLD: 1.5 % (ref 0–8)
ERYTHROCYTE [DISTWIDTH] IN BLOOD BY AUTOMATED COUNT: 12.1 % (ref 11.5–14.5)
EST. GFR  (AFRICAN AMERICAN): >60 ML/MIN/1.73 M^2
EST. GFR  (NON AFRICAN AMERICAN): >60 ML/MIN/1.73 M^2
FERRITIN SERPL-MCNC: 64 NG/ML (ref 20–300)
GLUCOSE SERPL-MCNC: 97 MG/DL (ref 70–110)
HCT VFR BLD AUTO: 38.8 % (ref 37–48.5)
HGB BLD-MCNC: 12.5 G/DL (ref 12–16)
IMM GRANULOCYTES # BLD AUTO: 0 K/UL (ref 0–0.04)
IMM GRANULOCYTES NFR BLD AUTO: 0 % (ref 0–0.5)
LYMPHOCYTES # BLD AUTO: 1.1 K/UL (ref 1–4.8)
LYMPHOCYTES NFR BLD: 24.1 % (ref 18–48)
MCH RBC QN AUTO: 31.4 PG (ref 27–31)
MCHC RBC AUTO-ENTMCNC: 32.2 G/DL (ref 32–36)
MCV RBC AUTO: 98 FL (ref 82–98)
MONOCYTES # BLD AUTO: 0.5 K/UL (ref 0.3–1)
MONOCYTES NFR BLD: 11.4 % (ref 4–15)
NEUTROPHILS # BLD AUTO: 2.9 K/UL (ref 1.8–7.7)
NEUTROPHILS NFR BLD: 62.6 % (ref 38–73)
NRBC BLD-RTO: 0 /100 WBC
PLATELET # BLD AUTO: 191 K/UL (ref 150–350)
PMV BLD AUTO: 11.1 FL (ref 9.2–12.9)
POTASSIUM SERPL-SCNC: 4.5 MMOL/L (ref 3.5–5.1)
PROT SERPL-MCNC: 7.2 G/DL (ref 6–8.4)
RBC # BLD AUTO: 3.98 M/UL (ref 4–5.4)
SODIUM SERPL-SCNC: 140 MMOL/L (ref 136–145)
WBC # BLD AUTO: 4.64 K/UL (ref 3.9–12.7)

## 2019-10-07 PROCEDURE — 82728 ASSAY OF FERRITIN: CPT

## 2019-10-07 PROCEDURE — 36415 COLL VENOUS BLD VENIPUNCTURE: CPT

## 2019-10-07 PROCEDURE — 80053 COMPREHEN METABOLIC PANEL: CPT

## 2019-10-07 PROCEDURE — 85025 COMPLETE CBC W/AUTO DIFF WBC: CPT

## 2019-10-08 ENCOUNTER — TELEPHONE (OUTPATIENT)
Dept: HEPATOLOGY | Facility: CLINIC | Age: 54
End: 2019-10-08

## 2019-10-08 NOTE — TELEPHONE ENCOUNTER
----- Message from Chetna Shah NP sent at 10/7/2019  5:45 PM CDT -----  Please mail to patient: Labs stable, see you in March

## 2019-10-25 DIAGNOSIS — F41.9 ANXIETY: ICD-10-CM

## 2019-10-25 RX ORDER — ALPRAZOLAM 0.5 MG/1
TABLET ORAL
Qty: 30 TABLET | Refills: 0 | Status: SHIPPED | OUTPATIENT
Start: 2019-10-25 | End: 2020-01-17

## 2019-12-04 ENCOUNTER — LAB VISIT (OUTPATIENT)
Dept: LAB | Facility: HOSPITAL | Age: 54
End: 2019-12-04
Attending: INTERNAL MEDICINE
Payer: COMMERCIAL

## 2019-12-04 DIAGNOSIS — E78.5 HYPERLIPIDEMIA, UNSPECIFIED HYPERLIPIDEMIA TYPE: ICD-10-CM

## 2019-12-04 DIAGNOSIS — F41.9 ANXIETY: ICD-10-CM

## 2019-12-04 DIAGNOSIS — E83.110 HEREDITARY HEMOCHROMATOSIS: ICD-10-CM

## 2019-12-04 DIAGNOSIS — E66.9 OBESITY (BMI 30-39.9): ICD-10-CM

## 2019-12-04 LAB
ALBUMIN SERPL BCP-MCNC: 3.8 G/DL (ref 3.5–5.2)
ALP SERPL-CCNC: 68 U/L (ref 55–135)
ALT SERPL W/O P-5'-P-CCNC: 10 U/L (ref 10–44)
ANION GAP SERPL CALC-SCNC: 10 MMOL/L (ref 8–16)
AST SERPL-CCNC: 12 U/L (ref 10–40)
BASOPHILS # BLD AUTO: 0.02 K/UL (ref 0–0.2)
BASOPHILS NFR BLD: 0.4 % (ref 0–1.9)
BILIRUB SERPL-MCNC: 0.3 MG/DL (ref 0.1–1)
BUN SERPL-MCNC: 24 MG/DL (ref 6–20)
CALCIUM SERPL-MCNC: 9.6 MG/DL (ref 8.7–10.5)
CHLORIDE SERPL-SCNC: 104 MMOL/L (ref 95–110)
CHOLEST SERPL-MCNC: 275 MG/DL (ref 120–199)
CHOLEST/HDLC SERPL: 5.7 {RATIO} (ref 2–5)
CO2 SERPL-SCNC: 27 MMOL/L (ref 23–29)
CREAT SERPL-MCNC: 0.8 MG/DL (ref 0.5–1.4)
DIFFERENTIAL METHOD: ABNORMAL
EOSINOPHIL # BLD AUTO: 0.1 K/UL (ref 0–0.5)
EOSINOPHIL NFR BLD: 2.8 % (ref 0–8)
ERYTHROCYTE [DISTWIDTH] IN BLOOD BY AUTOMATED COUNT: 11.6 % (ref 11.5–14.5)
EST. GFR  (AFRICAN AMERICAN): >60 ML/MIN/1.73 M^2
EST. GFR  (NON AFRICAN AMERICAN): >60 ML/MIN/1.73 M^2
ESTIMATED AVG GLUCOSE: 100 MG/DL (ref 68–131)
FERRITIN SERPL-MCNC: 43 NG/ML (ref 20–300)
GLUCOSE SERPL-MCNC: 95 MG/DL (ref 70–110)
HBA1C MFR BLD HPLC: 5.1 % (ref 4–5.6)
HCT VFR BLD AUTO: 41.3 % (ref 37–48.5)
HDLC SERPL-MCNC: 48 MG/DL (ref 40–75)
HDLC SERPL: 17.5 % (ref 20–50)
HGB BLD-MCNC: 13.4 G/DL (ref 12–16)
IMM GRANULOCYTES # BLD AUTO: 0.01 K/UL (ref 0–0.04)
IMM GRANULOCYTES NFR BLD AUTO: 0.2 % (ref 0–0.5)
INR PPP: 1 (ref 0.8–1.2)
LDLC SERPL CALC-MCNC: 169.8 MG/DL (ref 63–159)
LYMPHOCYTES # BLD AUTO: 1.4 K/UL (ref 1–4.8)
LYMPHOCYTES NFR BLD: 29 % (ref 18–48)
MCH RBC QN AUTO: 31.3 PG (ref 27–31)
MCHC RBC AUTO-ENTMCNC: 32.4 G/DL (ref 32–36)
MCV RBC AUTO: 97 FL (ref 82–98)
MONOCYTES # BLD AUTO: 0.5 K/UL (ref 0.3–1)
MONOCYTES NFR BLD: 10.7 % (ref 4–15)
NEUTROPHILS # BLD AUTO: 2.7 K/UL (ref 1.8–7.7)
NEUTROPHILS NFR BLD: 56.9 % (ref 38–73)
NONHDLC SERPL-MCNC: 227 MG/DL
NRBC BLD-RTO: 0 /100 WBC
PLATELET # BLD AUTO: 206 K/UL (ref 150–350)
PMV BLD AUTO: 10.6 FL (ref 9.2–12.9)
POTASSIUM SERPL-SCNC: 4.2 MMOL/L (ref 3.5–5.1)
PROT SERPL-MCNC: 7.5 G/DL (ref 6–8.4)
PROTHROMBIN TIME: 10.3 SEC (ref 9–12.5)
RBC # BLD AUTO: 4.28 M/UL (ref 4–5.4)
SODIUM SERPL-SCNC: 141 MMOL/L (ref 136–145)
T4 FREE SERPL-MCNC: 0.78 NG/DL (ref 0.71–1.51)
TRIGL SERPL-MCNC: 286 MG/DL (ref 30–150)
TSH SERPL DL<=0.005 MIU/L-ACNC: 4.24 UIU/ML (ref 0.4–4)
WBC # BLD AUTO: 4.69 K/UL (ref 3.9–12.7)

## 2019-12-04 PROCEDURE — 82728 ASSAY OF FERRITIN: CPT

## 2019-12-04 PROCEDURE — 80061 LIPID PANEL: CPT

## 2019-12-04 PROCEDURE — 85610 PROTHROMBIN TIME: CPT

## 2019-12-04 PROCEDURE — 36415 COLL VENOUS BLD VENIPUNCTURE: CPT

## 2019-12-04 PROCEDURE — 83036 HEMOGLOBIN GLYCOSYLATED A1C: CPT

## 2019-12-04 PROCEDURE — 80053 COMPREHEN METABOLIC PANEL: CPT

## 2019-12-04 PROCEDURE — 85025 COMPLETE CBC W/AUTO DIFF WBC: CPT

## 2019-12-04 PROCEDURE — 84443 ASSAY THYROID STIM HORMONE: CPT

## 2019-12-04 PROCEDURE — 84439 ASSAY OF FREE THYROXINE: CPT

## 2019-12-05 ENCOUNTER — TELEPHONE (OUTPATIENT)
Dept: HEPATOLOGY | Facility: CLINIC | Age: 54
End: 2019-12-05

## 2019-12-05 NOTE — TELEPHONE ENCOUNTER
----- Message from Chetna Shah NP sent at 12/4/2019  5:29 PM CST -----  Please mail to patient: Ferritin stable (at goal). A1c (diabetes screening marker) within normal range

## 2019-12-20 ENCOUNTER — OFFICE VISIT (OUTPATIENT)
Dept: INTERNAL MEDICINE | Facility: CLINIC | Age: 54
End: 2019-12-20
Payer: COMMERCIAL

## 2019-12-20 VITALS
WEIGHT: 185.63 LBS | SYSTOLIC BLOOD PRESSURE: 118 MMHG | RESPIRATION RATE: 16 BRPM | HEIGHT: 62 IN | OXYGEN SATURATION: 98 % | HEART RATE: 76 BPM | BODY MASS INDEX: 34.16 KG/M2 | DIASTOLIC BLOOD PRESSURE: 78 MMHG

## 2019-12-20 DIAGNOSIS — G20.A1 PARKINSON DISEASE: ICD-10-CM

## 2019-12-20 DIAGNOSIS — E78.5 HYPERLIPIDEMIA, UNSPECIFIED HYPERLIPIDEMIA TYPE: Primary | ICD-10-CM

## 2019-12-20 DIAGNOSIS — E66.9 OBESITY (BMI 30-39.9): ICD-10-CM

## 2019-12-20 DIAGNOSIS — F41.9 ANXIETY: ICD-10-CM

## 2019-12-20 DIAGNOSIS — E83.110 HEREDITARY HEMOCHROMATOSIS: ICD-10-CM

## 2019-12-20 PROCEDURE — 99999 PR PBB SHADOW E&M-EST. PATIENT-LVL III: CPT | Mod: PBBFAC,,, | Performed by: INTERNAL MEDICINE

## 2019-12-20 PROCEDURE — 99214 PR OFFICE/OUTPT VISIT, EST, LEVL IV, 30-39 MIN: ICD-10-PCS | Mod: S$GLB,,, | Performed by: INTERNAL MEDICINE

## 2019-12-20 PROCEDURE — 3008F PR BODY MASS INDEX (BMI) DOCUMENTED: ICD-10-PCS | Mod: CPTII,S$GLB,, | Performed by: INTERNAL MEDICINE

## 2019-12-20 PROCEDURE — 99214 OFFICE O/P EST MOD 30 MIN: CPT | Mod: S$GLB,,, | Performed by: INTERNAL MEDICINE

## 2019-12-20 PROCEDURE — 3008F BODY MASS INDEX DOCD: CPT | Mod: CPTII,S$GLB,, | Performed by: INTERNAL MEDICINE

## 2019-12-20 PROCEDURE — 99999 PR PBB SHADOW E&M-EST. PATIENT-LVL III: ICD-10-PCS | Mod: PBBFAC,,, | Performed by: INTERNAL MEDICINE

## 2019-12-20 RX ORDER — ATORVASTATIN CALCIUM 40 MG/1
40 TABLET, FILM COATED ORAL DAILY
Qty: 90 TABLET | Refills: 3 | Status: SHIPPED | OUTPATIENT
Start: 2019-12-20 | End: 2021-01-08 | Stop reason: SDUPTHER

## 2019-12-20 RX ORDER — NYSTATIN 100000 U/G
CREAM TOPICAL 2 TIMES DAILY
Qty: 30 G | Refills: 1 | Status: SHIPPED | OUTPATIENT
Start: 2019-12-20 | End: 2020-07-21

## 2019-12-20 NOTE — PROGRESS NOTES
Subjective:       Patient ID: Rachel Leal is a 54 y.o. female.    Chief Complaint: Follow-up      HPI:  Patient is known to me and presents for follow up DNA positive hemochromotosis, h/o breast cancer and anxiety.  Labs from 12/4/19 personally reviewed and discussed with the patient today.      Today she reports right knee pain. She did see ortho since I saw her last. Diagnosed with torn meniscus. Did steroid injection with mild relief. Planning for surgery after the holidays.      GERD: on  nexium. working well. No abd pain n/v/d/c. No dysphagia/odynophagia     H/o breast cancer with metastasis to bone (stage VI at diagnosis): dx 2013. Follows with Dr. Gerardo--he is retiring but will establish with is replacement. On arimidex and xgeva; s/p left mastectomy. Doing CT scans with oncology, no MMG. she did follow with her oncologist recently. Did PET and no mets     Anxiety: Effexor and Xanax currently. Mood is well controlled. Denies depressed mood. Denies SI. Using Xanax very sparingly     HLD: was taking omega 3 OTC and lipitor- both caused sdie effects (SOB and hair loss respectively). Repeat cholesterol off meds very elevated again;  so started pravastatin. LDL still 169. TG still a little elevated     Parkinson's: she is taking sinement. She was given neuopro but resulted in cellulitis that responded well to antibx given at urgent care. Stopped the patch and hasn't had any further reactions. Feels she is doing well on current treatment     Hemochromotosis: diagnosed with positive DNA analysis but no sympotms. Ferritin 400s. She does follow with hepatology. Last US 12/2015 showed fatty infiltration otherwise normal. Last AST/ALT normal, normal bilirubin. No history of blood sugar problems. Had recent bx with hepatology as well.  Past Medical History:   Diagnosis Date    Cancer     breast stage iv, remission 2014    Fatty liver     Hereditary hemochromatosis 12/16/2015    History of TMJ syndrome      Hyperlipidemia     Hypertension     Hypoglycemia     Osteoarthritis     Parkinson disease     Torn meniscus        Family History   Problem Relation Age of Onset    Colon cancer Mother     Diabetes Father     Heart disease Father     Hemochromatosis Brother     Cirrhosis Brother         hemochromotosis    Breast cancer Neg Hx     Ovarian cancer Neg Hx        Social History     Socioeconomic History    Marital status:      Spouse name: Not on file    Number of children: Not on file    Years of education: Not on file    Highest education level: Not on file   Occupational History    Not on file   Social Needs    Financial resource strain: Not on file    Food insecurity:     Worry: Not on file     Inability: Not on file    Transportation needs:     Medical: Not on file     Non-medical: Not on file   Tobacco Use    Smoking status: Former Smoker     Packs/day: 0.25     Types: Cigarettes     Last attempt to quit: 2013     Years since quittin.2    Smokeless tobacco: Never Used   Substance and Sexual Activity    Alcohol use: Yes     Comment: drinking 6 peers per month, no daily use     Drug use: No    Sexual activity: Yes     Partners: Male     Birth control/protection: Surgical     Comment: .   Lifestyle    Physical activity:     Days per week: Not on file     Minutes per session: Not on file    Stress: Not on file   Relationships    Social connections:     Talks on phone: Not on file     Gets together: Not on file     Attends Oriental orthodox service: Not on file     Active member of club or organization: Not on file     Attends meetings of clubs or organizations: Not on file     Relationship status: Not on file   Other Topics Concern    Not on file   Social History Narrative    Not on file       Review of Systems   Constitutional: Negative for activity change, fatigue, fever and unexpected weight change.   HENT: Negative for congestion, ear pain, hearing loss, rhinorrhea  and sore throat.    Eyes: Negative for pain, redness and visual disturbance.   Respiratory: Negative for cough, shortness of breath and wheezing.    Cardiovascular: Negative for chest pain, palpitations and leg swelling.   Gastrointestinal: Negative for abdominal pain, constipation, diarrhea, nausea and vomiting.   Genitourinary: Negative for dysuria, frequency, pelvic pain and urgency.   Musculoskeletal: Negative for back pain, joint swelling and neck pain.   Skin: Negative for color change, rash and wound.   Neurological: Positive for tremors (chronic). Negative for dizziness, weakness, light-headedness and headaches.         Objective:      Physical Exam   Constitutional: She is oriented to person, place, and time. She appears well-developed and well-nourished. No distress.   HENT:   Head: Normocephalic and atraumatic.   Right Ear: External ear normal.   Left Ear: External ear normal.   Eyes: Pupils are equal, round, and reactive to light. Conjunctivae and EOM are normal. Right eye exhibits no discharge. Left eye exhibits no discharge.   Neck: Neck supple. No tracheal deviation present.   Cardiovascular: Normal rate and regular rhythm.   No murmur heard.  Pulmonary/Chest: Effort normal and breath sounds normal. No respiratory distress. She has no wheezes. She has no rales.   Abdominal: Soft. Bowel sounds are normal. She exhibits no distension. There is no tenderness.   Neurological: She is alert and oriented to person, place, and time. No cranial nerve deficit.   Skin: Skin is warm and dry.   Psychiatric: She has a normal mood and affect. Her behavior is normal.   Vitals reviewed.      Assessment:       1. Hyperlipidemia, unspecified hyperlipidemia type    2. Parkinson disease    3. Anxiety    4. Hereditary hemochromatosis    5. Obesity (BMI 30-39.9)        Plan:       Rachel was seen today for follow-up.    Diagnoses and all orders for this visit:    Hyperlipidemia, unspecified hyperlipidemia type  -      atorvastatin (LIPITOR) 40 MG tablet; Take 1 tablet (40 mg total) by mouth once daily.  -     CBC auto differential; Future  -     Comprehensive metabolic panel; Future  -     TSH; Future  -     Lipid panel; Future  Chronic ucnotnrolled  Will trial atorvastatin again, stop pravastatin  Diet, exercise weigh tloss    Parkinson disease  -     CBC auto differential; Future  -     Comprehensive metabolic panel; Future  -     TSH; Future  -     Lipid panel; Future  Chronic stable  Cont meds same dose  Sees neurolgoy    Anxiety  -     CBC auto differential; Future  -     Comprehensive metabolic panel; Future  -     TSH; Future  -     Lipid panel; Future  Chronic stable  Cont meds same dose    Hereditary hemochromatosis  Chronic stable  Does therapeutic phlebotomy  Follows with hepatology    Obesity (BMI 30-39.9)  -     CBC auto differential; Future  -     Comprehensive metabolic panel; Future  -     TSH; Future  -     Lipid panel; Future  Diet and exercise discussed    Other orders  -     nystatin (MYCOSTATIN) cream; Apply topically 2 (two) times daily.         Health Maintenance:  -CRC: age 49 (mom and grandmother with CRC). Had polyps, unsure what kind. Done 8/2016  -PAP: s/p hysterectomy  -MMG: s/p left mastectomy, doing CT for screening  -tobacco: reports occasional use  -Vaccines: flu declines    RTC 6 months with labs and PRN

## 2020-01-09 ENCOUNTER — TELEPHONE (OUTPATIENT)
Dept: HEPATOLOGY | Facility: CLINIC | Age: 55
End: 2020-01-09

## 2020-01-09 NOTE — TELEPHONE ENCOUNTER
Contacted patient and got her scheduled for the 10th of March with Ms. Basilio due to her having an appointment on the 3rd of March. Patient stated its okay and then inquired about phlebotomy not being done since October. I informed patient that I will contact Ms. Basilio and inform her of this information. Ms. Basilio stated she will look into patient chart and decide on a plan for patient.

## 2020-01-10 ENCOUNTER — TELEPHONE (OUTPATIENT)
Dept: HEPATOLOGY | Facility: CLINIC | Age: 55
End: 2020-01-10

## 2020-01-10 DIAGNOSIS — E83.110 HEREDITARY HEMOCHROMATOSIS: Primary | ICD-10-CM

## 2020-01-10 NOTE — TELEPHONE ENCOUNTER
Called pt to discuss, she is currently followed by oncology Dr. Loo for h/o breast cancer. Pt to inquire about arranging phlebotomy with current hem/onc provider. Pt to contact [facility] if cannot follow with Dr. Loo for phlebotomy and evaluation given HH

## 2020-01-10 NOTE — TELEPHONE ENCOUNTER
----- Message from William Alejo MA sent at 1/9/2020  4:22 PM CST -----  Chetna Shah,    This patient is inquiring about phlebotomy being done. Patient stated she can not get it done at [facility]. Please see forwarding message for further advise on patient.                                                     Thank You!

## 2020-01-10 NOTE — TELEPHONE ENCOUNTER
----- Message from Carla Elaine, RN sent at 1/10/2020 10:59 AM CST -----  Regarding: Referral to hem/onc  Good morning,  I saw the referral for this patient to see hematology for hemachromatosis. She wanted to be seen closer to home so I messaged [facility]. They let us know that the pt already has a hem/onc physician, Dr.Ashish Loo at [facility] & she has a follow up with him on 1-. I faxed him the most recent labs & note. Let me know if you need anything else for her, thanks.  Nicolle

## 2020-01-10 NOTE — TELEPHONE ENCOUNTER
Called pt, recommend referral to hematology for HH and chronic phlebotomy management    Will follow along with yearly visits and fibroscan to assess from liver standpoint. Pt given contact information to schedule appt and pt aware if can schedule hematology appt, then can move f/u hepatology appt to Sept instead of visit scheduled in March

## 2020-01-16 DIAGNOSIS — F41.9 ANXIETY: ICD-10-CM

## 2020-01-17 ENCOUNTER — TELEPHONE (OUTPATIENT)
Dept: NEUROLOGY | Facility: CLINIC | Age: 55
End: 2020-01-17

## 2020-01-17 ENCOUNTER — TELEPHONE (OUTPATIENT)
Dept: INTERNAL MEDICINE | Facility: CLINIC | Age: 55
End: 2020-01-17

## 2020-01-17 RX ORDER — ALPRAZOLAM 0.5 MG/1
TABLET ORAL
Qty: 30 TABLET | Refills: 0 | Status: SHIPPED | OUTPATIENT
Start: 2020-01-17 | End: 2020-03-05 | Stop reason: SDUPTHER

## 2020-01-17 NOTE — TELEPHONE ENCOUNTER
At our last visit patient asked when our blood bank would open again. Just saw these dates in an email so please let her know:        Wednesday January 22, 2020 8:30am - 5:30pm   Saturday January 25, 2020 7:00am - 3:00pm   Monday January 27, 2020 8:30am - 5:30pm       Thanks!

## 2020-01-17 NOTE — TELEPHONE ENCOUNTER
----- Message from Laura Torres sent at 2020 12:39 PM CST -----  Contact: PATIENT  Rachel Leal  MRN: [id number]  : 1965  PCP: Cata Leal  Home Phone      [phone]  Work Phone      Not on file.  Mobile          [phone]  Home Phone      [phone]      MESSAGE: Patient states that the Carba-Levadopa is not working any longer and would like to know if there any other recommendations that Dr. Henry might have.        Phone: [phone]

## 2020-01-17 NOTE — TELEPHONE ENCOUNTER
Patient states that Sinemet is no longer helping with the tremor. Appointment scheduled for 1/20/19 at 2:45 pm. Patient is aware.

## 2020-01-19 ENCOUNTER — PATIENT OUTREACH (OUTPATIENT)
Dept: ADMINISTRATIVE | Facility: OTHER | Age: 55
End: 2020-01-19

## 2020-01-20 ENCOUNTER — OFFICE VISIT (OUTPATIENT)
Dept: NEUROLOGY | Facility: CLINIC | Age: 55
End: 2020-01-20
Payer: COMMERCIAL

## 2020-01-20 ENCOUNTER — TELEPHONE (OUTPATIENT)
Dept: HEPATOLOGY | Facility: CLINIC | Age: 55
End: 2020-01-20

## 2020-01-20 VITALS
DIASTOLIC BLOOD PRESSURE: 62 MMHG | HEIGHT: 62 IN | WEIGHT: 186.06 LBS | RESPIRATION RATE: 16 BRPM | HEART RATE: 72 BPM | BODY MASS INDEX: 34.24 KG/M2 | SYSTOLIC BLOOD PRESSURE: 110 MMHG

## 2020-01-20 DIAGNOSIS — E83.110 HEREDITARY HEMOCHROMATOSIS: ICD-10-CM

## 2020-01-20 DIAGNOSIS — G56.01 CARPAL TUNNEL SYNDROME OF RIGHT WRIST: ICD-10-CM

## 2020-01-20 DIAGNOSIS — G20.A1 PARKINSON DISEASE: Primary | ICD-10-CM

## 2020-01-20 PROCEDURE — 99214 OFFICE O/P EST MOD 30 MIN: CPT | Mod: S$GLB,,, | Performed by: PSYCHIATRY & NEUROLOGY

## 2020-01-20 PROCEDURE — 99214 PR OFFICE/OUTPT VISIT, EST, LEVL IV, 30-39 MIN: ICD-10-PCS | Mod: S$GLB,,, | Performed by: PSYCHIATRY & NEUROLOGY

## 2020-01-20 PROCEDURE — 3008F BODY MASS INDEX DOCD: CPT | Mod: CPTII,S$GLB,, | Performed by: PSYCHIATRY & NEUROLOGY

## 2020-01-20 PROCEDURE — 99999 PR PBB SHADOW E&M-EST. PATIENT-LVL III: CPT | Mod: PBBFAC,,, | Performed by: PSYCHIATRY & NEUROLOGY

## 2020-01-20 PROCEDURE — 99999 PR PBB SHADOW E&M-EST. PATIENT-LVL III: ICD-10-PCS | Mod: PBBFAC,,, | Performed by: PSYCHIATRY & NEUROLOGY

## 2020-01-20 PROCEDURE — 3008F PR BODY MASS INDEX (BMI) DOCUMENTED: ICD-10-PCS | Mod: CPTII,S$GLB,, | Performed by: PSYCHIATRY & NEUROLOGY

## 2020-01-20 RX ORDER — AMANTADINE HYDROCHLORIDE 100 MG/1
CAPSULE, GELATIN COATED ORAL
Qty: 60 CAPSULE | Refills: 11 | Status: SHIPPED | OUTPATIENT
Start: 2020-01-20 | End: 2020-03-26

## 2020-01-20 NOTE — PROGRESS NOTES
HPI:  Rachel Leal is a 54 y.o. female with C spine MRI showed mild degenerative changes but some NF narrowing by Xray.  EMG showed Left Carpal tunnel syndrome (mild to moderate) and  Borderline/ Early Carpal Tunnel Syndrome on the Right now s/p Left CTR  And over 3 years of right arm jumping after stretching and sometimes decreased strength/fine movement -- progressing to right and arm leg tremor and right bradykinesia all suggesting Parkinson's disease.     Patient is here sooner than her scheduled follow up to discuss sinemet which she does not feel is as effective as prior    She has increased sinemet to twice daily every day and tremor did not improve. She has tried without it and with less of it without benefit.     She has stopped the medication for now.     She mostly has tremor and she has some slowing movements over the past few months.   Still with fatigue    CT symptoms are not active    NO new medications.     Review of Systems   Constitutional: Negative for fever.   HENT: Negative for nosebleeds.    Eyes: Negative for double vision.   Respiratory: Negative for shortness of breath.    Cardiovascular: Negative for leg swelling.   Gastrointestinal: Negative for blood in stool.   Genitourinary: Negative for hematuria.   Musculoskeletal: Negative for falls.   Skin: Negative for rash.   Neurological: Positive for tremors.   Psychiatric/Behavioral: Negative for memory loss.       Exam:  Gen Appearance, well developed/nourished in no apparent distress  CV: 2+ distal pulses with no edema or swelling  Neuro:  MS: Awake, alert,  Sustains attention. Recent/remote memory intact, Language is full to spontaneous speech/comprehension. Fund of Knowledge is full  CN: Optic discs are flat with normal vasculature, PERRL, Extraoccular movements and visual fields are full. Normal facial sensation and strength,  Tongue and Palate are midline and strong. Shoulder Shrug symmetric and strong.  Motor: Normal bulk, tone  is increased with moderate rigidity on the right arm. Mild fleeting resting tremor  At times in the hands,  She is bradykinetic on the right with finger tapping than left- mildly improved on sinemet again today . 5/5 strength bilateral upper/lower extremities with 2+ reflexes   Sensory: symmetric to  temp, and vibration.  Romberg negative  Cerebellar: Finger-nose, Rapid alternating movements intact  Gait: Normal stance, no ataxia and no enbloc turning but reduced arm swing on the right. No shuffling and good postural reflexes.         EMG 2014:   1. Left Carpal tunnel syndrome (mild to moderate)  2. Borderline/ Early Carpal Tunnel Syndrome on the Right      12/2015 MRI brain : No significant abnormalities.   Some mild white matter disease, very mild        Assessment/Plan: Rachel Leal is a 54 y.o. female with C spine MRI showed mild degenerative changes but some NF narrowing by Xray.  EMG showed Left Carpal tunnel syndrome (mild to moderate) and  Borderline/ Early Carpal Tunnel Syndrome on the Right now s/p Left CTR  And then right arm jumping after stretching and sometimes decreased strength/fine movement -- progressing to right and arm leg tremor and right bradykinesia all suggesting Parkinson's disease.   I recommend:   1. PD thought to be genetic per movement disorder's clinic, Dr Hinson- will send back if/when needed for DBS consideration if able  2. Note her Hereditary hemochromatosis. She is a homozygote for [id number]. Note idiopathic PD has been reported in this condition along with other basal ganglia changes related to iron deposition. Her prior MRI brain was unremarkable. Hemochromatosis is currently followed and treated by hepatology  3. Mirapex and Requip caused elevated BP, she could not tolerate neuopro patch due to skin reaction.  - Hesitant to start Azilect at this time given her side effects prior.   -Try  amandatine for PD and likely PD related fatigue unless side effects  4.  Sinemet was  helpful at first, but she would like to avoid higher dosing for now and is off of this medication at this time as she will be at risk of dyskinesia (reviewed today)- no changes made from movement disorders specialist.    5. Her Cervical neck pain is also resolved and her CTS Symptoms on the right are not active    RTC in 6 months

## 2020-01-20 NOTE — TELEPHONE ENCOUNTER
Contacted patient and rescheduled patient as instructed by Ms. Basilio due to her maternity leave approaching. Sent reminder in the mail.

## 2020-01-23 ENCOUNTER — TELEPHONE (OUTPATIENT)
Dept: HEPATOLOGY | Facility: CLINIC | Age: 55
End: 2020-01-23

## 2020-01-23 DIAGNOSIS — K76.0 FATTY LIVER: ICD-10-CM

## 2020-01-23 DIAGNOSIS — E83.110 HEREDITARY HEMOCHROMATOSIS: Primary | ICD-10-CM

## 2020-01-23 NOTE — TELEPHONE ENCOUNTER
I did not recommend that she change to urologist or change from our liver care. She was looking into arranging her phlebotomy with her local hematologist for her hemochromatosis. If she was able to do that, I recommend f/u in hepatology yearly (instead of every 6 months as she is doing now), next due in August. Please schedule f/u in hepatology in August with fibroscan and all labs same day as visit    Thanks

## 2020-01-23 NOTE — TELEPHONE ENCOUNTER
"Contacted patient and she stated she talked to Ms. Basilio about switching to her Urologist for further care. Patient cancelled every appointment that was originally scheduled and asked for her medical records to be forwarded to Dr. Loo fax [phone]. I informed patient that since she needs her entire medical records she would need to contact that department. I provided patient with the medical record department number. Patient then stated she need Ms. Basilio to put in lab orders for her to receive blood work on today,  I informed patient orders are already in and scheduled patient for today at [place]. Patient stated that is the last thing she needed from Ms. Basilio and her forwarding doctor will handle everything from this point on. I informed patient that I will inform Ms. Basilio of the changes. Patient stated "Okay"   "

## 2020-01-23 NOTE — TELEPHONE ENCOUNTER
Attempted to contact patient and scheduled appointment but patient did not answer. I left patient a voicemail stating the purpose for the call. Awaiting a call back.

## 2020-01-24 ENCOUNTER — LAB VISIT (OUTPATIENT)
Dept: LAB | Facility: HOSPITAL | Age: 55
End: 2020-01-24
Attending: NURSE PRACTITIONER
Payer: COMMERCIAL

## 2020-01-24 DIAGNOSIS — E83.110 HEREDITARY HEMOCHROMATOSIS: ICD-10-CM

## 2020-01-24 DIAGNOSIS — E83.110 HEREDITARY HEMOCHROMATOSIS: Primary | ICD-10-CM

## 2020-01-24 DIAGNOSIS — K76.0 FATTY LIVER: ICD-10-CM

## 2020-01-24 PROCEDURE — 36415 COLL VENOUS BLD VENIPUNCTURE: CPT

## 2020-01-24 PROCEDURE — 80321 ALCOHOLS BIOMARKERS 1OR 2: CPT

## 2020-02-04 ENCOUNTER — TELEPHONE (OUTPATIENT)
Dept: HEPATOLOGY | Facility: CLINIC | Age: 55
End: 2020-02-04

## 2020-02-04 LAB — PHOSPHATIDYLETHANOL (PETH): 95 NG/ML

## 2020-02-04 NOTE — TELEPHONE ENCOUNTER
----- Message from Chetna Shah NP sent at 2/4/2020  1:54 PM CST -----  Please mail to patient: Labs continue to suggest heavier alcohol use. I recommend NO alcohol use (wine, beer or liquor) for life given hemochromatosis and increased risk for worsening liver disease in the future

## 2020-02-12 NOTE — TELEPHONE ENCOUNTER
----- Message from Francheska Morin sent at 2020 10:10 AM CST -----  Contact: self   Rachel Leal  MRN: [id number]  : 1965  PCP: Cata Leal  Home Phone      [phone]  Work Phone      Not on file.  Mobile          [phone]  Home Phone      [phone]    MESSAGE:   Rx refill - losartan-hydrochlorothiazide 50-12.5 mg (HYZAAR) 50-12.5 mg per tablet.    Phone # [phone]    Pharmacy - [facility]/pharmacy #[id number] - [address]

## 2020-02-13 RX ORDER — LOSARTAN POTASSIUM AND HYDROCHLOROTHIAZIDE 12.5; 5 MG/1; MG/1
1 TABLET ORAL DAILY
Qty: 30 TABLET | Refills: 1 | Status: SHIPPED | OUTPATIENT
Start: 2020-02-13 | End: 2020-03-11

## 2020-02-13 NOTE — TELEPHONE ENCOUNTER
----- Message from Francheska Morin sent at 2020  1:19 PM CST -----  Contact: self   Rachel Leal  MRN: [id number]  : 1965  PCP: Cata Leal  Home Phone      [phone]  Work Phone      Not on file.  Mobile          [phone]  Home Phone      [phone]    MESSAGE:   Rx refill - losartan-hydrochlorothiazide 50-12.5 mg (HYZAAR) 50-12.5 mg per tablet.  Patient is out of medication / 90 day Rx requested.     Phone # [phone]    Pharmacy -  [facility]/pharmacy #[id number] - [address]

## 2020-03-05 DIAGNOSIS — F41.9 ANXIETY: ICD-10-CM

## 2020-03-05 RX ORDER — ALPRAZOLAM 0.5 MG/1
TABLET ORAL
Qty: 30 TABLET | Refills: 0 | Status: SHIPPED | OUTPATIENT
Start: 2020-03-05 | End: 2020-05-27 | Stop reason: SDUPTHER

## 2020-03-05 NOTE — TELEPHONE ENCOUNTER
Rachel desire refill of Xanax. Last office visit 12/2019, scheduled 5/2020. Please advise.   Patient Active Problem List   Diagnosis    Hyperlipidemia    Hx of breast cancer    Hereditary hemochromatosis    Anxiety    Parkinson disease    Fatty liver    Obesity (BMI 30-39.9)    Condyloma     Prior to Admission medications    Medication Sig Start Date End Date Taking? Authorizing Provider   ALPRAZolam (XANAX) 0.5 MG tablet TAKE 1 TABLET BY MOUTH EVERY DAY AT NIGHT AS NEEDED 1/17/20   Cata Leal MD   amantadine HCl (SYMMETREL) 100 mg capsule Take 1/2 BID for 6 weeks, then one BID 1/20/20   Julius Henry MD   anastrozole (ARIMIDEX) 1 mg Tab Take 1 mg by mouth once daily.    Historical Provider, MD   atorvastatin (LIPITOR) 40 MG tablet Take 1 tablet (40 mg total) by mouth once daily. 12/20/19 12/19/20  Cata Leal MD   carbidopa-levodopa  mg (SINEMET)  mg per tablet TAKE 1 TABLET BY MOUTH 2 (TWO) TIMES DAILY.  Patient not taking: Reported on 1/20/2020 4/9/19   Julius Henry MD   COCONUT OIL ORAL Take 2 capsules by mouth once daily.    Historical Provider, MD   denosumab (XGEVA) 120 mg/1.7 mL (70 mg/mL) Soln Inject 120 mg into the skin. Every 3 months    Historical Provider, MD   esomeprazole (NEXIUM) 20 MG capsule Take 1 capsule (20 mg total) by mouth as needed. 4/5/18 1/20/20  Chetna Shah NP   losartan-hydrochlorothiazide 50-12.5 mg (HYZAAR) 50-12.5 mg per tablet Take 1 tablet by mouth once daily. 2/13/20   Kristin Apple MD   nystatin (MYCOSTATIN) cream Apply topically 2 (two) times daily. 12/20/19   Cata Leal MD   UNABLE TO FIND Cramp defense    Historical Provider, MD   UNABLE TO FIND Womens daily care Probiotic    Historical Provider, MD   venlafaxine (EFFEXOR-XR) 75 MG 24 hr capsule Take 75 mg by mouth once daily.  11/8/17   Historical Provider, MD   vitamin D 1000 units Tab Take 1,000 mg by mouth once daily.     Historical Provider, MD

## 2020-03-05 NOTE — TELEPHONE ENCOUNTER
----- Message from Carolin Moya sent at 3/5/2020 12:26 PM CST -----  Contact: self  Rachel Leal  MRN: [id number]  : 1965  PCP: Cata Leal  Home Phone      [phone]  Work Phone      Not on file.  Mobile          [phone]  Home Phone      [phone]      MESSAGE:   Pt would like a refill on her Xanax called in to [facility] in [place]. Please return call @ [phone]. Thanks.

## 2020-03-05 NOTE — TELEPHONE ENCOUNTER
Approved by Dr. Apple. Rx faxed to [facility] at [phone] per request. Fax confirmation received.

## 2020-03-09 ENCOUNTER — PATIENT OUTREACH (OUTPATIENT)
Dept: ADMINISTRATIVE | Facility: HOSPITAL | Age: 55
End: 2020-03-09

## 2020-03-11 RX ORDER — LOSARTAN POTASSIUM AND HYDROCHLOROTHIAZIDE 12.5; 5 MG/1; MG/1
TABLET ORAL
Qty: 30 TABLET | Refills: 1 | Status: SHIPPED | OUTPATIENT
Start: 2020-03-11 | End: 2020-05-11

## 2020-03-25 NOTE — TELEPHONE ENCOUNTER
----- Message from Suzette Frye sent at 3/25/2020  3:55 PM CDT -----  Contact: self  Phone: [phone]    Pt is asking if she can up her dose TID.  amantadine HCl (SYMMETREL) 100 mg capsule

## 2020-03-26 ENCOUNTER — TELEPHONE (OUTPATIENT)
Dept: NEUROLOGY | Facility: CLINIC | Age: 55
End: 2020-03-26

## 2020-03-26 RX ORDER — AMANTADINE HYDROCHLORIDE 100 MG/1
100 CAPSULE, GELATIN COATED ORAL 3 TIMES DAILY
Qty: 90 CAPSULE | Refills: 11 | Status: SHIPPED | OUTPATIENT
Start: 2020-03-26 | End: 2020-03-26

## 2020-03-26 RX ORDER — AMANTADINE HYDROCHLORIDE 100 MG/1
100 CAPSULE, GELATIN COATED ORAL 3 TIMES DAILY
Qty: 90 CAPSULE | Refills: 11 | Status: SHIPPED | OUTPATIENT
Start: 2020-03-26 | End: 2021-01-14 | Stop reason: SDUPTHER

## 2020-03-26 NOTE — TELEPHONE ENCOUNTER
Sorry. Last encounter was closed and written in error.  I will call in Amantadine 100mg TID.  Please ask her to start it as follows:  100mg in the am, 100mg at noon and 100mg QOD alternating with 100mg BID for 2 weeks, then alternating to 100mg TID to be sure she can tolerate this.  Thanks.

## 2020-03-26 NOTE — TELEPHONE ENCOUNTER
Yes she can. I will send the Rx in for 100mg TID, but ask her to start it instead like this:  100mg in the am, 50mg (1/2 tablet)

## 2020-04-09 ENCOUNTER — TELEPHONE (OUTPATIENT)
Dept: NEUROLOGY | Facility: CLINIC | Age: 55
End: 2020-04-09

## 2020-04-09 NOTE — TELEPHONE ENCOUNTER
Suggest a televisit in the next 2 weeks to further determine if this is the best next step  Thanks

## 2020-04-09 NOTE — TELEPHONE ENCOUNTER
----- Message from Laurel Rabago sent at 2020  8:02 AM CDT -----  Contact: pt  Rachel Leal  MRN: [id number]  : 1965  PCP: Cata Leal  Home Phone      [phone]  Work Phone      Not on file.  [employer]          [phone]  Home Phone      [phone]      MESSAGE:     Pt is asking for advice regarding her medications    [phone]

## 2020-04-09 NOTE — TELEPHONE ENCOUNTER
Patient states that the Amantadine 100 mg is not helping with the tremor. She feels that it is making it worse. She is asking to switch back to Sinemet. Please advise.

## 2020-04-15 RX ORDER — CARBIDOPA AND LEVODOPA 25; 100 MG/1; MG/1
1 TABLET ORAL 2 TIMES DAILY
Qty: 180 TABLET | Refills: 1 | Status: SHIPPED | OUTPATIENT
Start: 2020-04-15 | End: 2020-05-27

## 2020-04-15 NOTE — TELEPHONE ENCOUNTER
Patient has decided to continue therapy until her appointment with you in July. I had scheduled her for early fup for 4/20/2020 but she called yesterday stating that she will stay on Sinemet for now.

## 2020-05-08 ENCOUNTER — LAB VISIT (OUTPATIENT)
Dept: LAB | Facility: HOSPITAL | Age: 55
End: 2020-05-08
Attending: INTERNAL MEDICINE
Payer: COMMERCIAL

## 2020-05-08 DIAGNOSIS — G20.A1 PARKINSON DISEASE: ICD-10-CM

## 2020-05-08 DIAGNOSIS — E78.5 HYPERLIPIDEMIA, UNSPECIFIED HYPERLIPIDEMIA TYPE: ICD-10-CM

## 2020-05-08 DIAGNOSIS — F41.9 ANXIETY: ICD-10-CM

## 2020-05-08 DIAGNOSIS — E66.9 OBESITY (BMI 30-39.9): ICD-10-CM

## 2020-05-08 DIAGNOSIS — E83.110 HEREDITARY HEMOCHROMATOSIS: Primary | ICD-10-CM

## 2020-05-08 LAB
ALBUMIN SERPL BCP-MCNC: 4 G/DL (ref 3.5–5.2)
ALP SERPL-CCNC: 89 U/L (ref 55–135)
ALT SERPL W/O P-5'-P-CCNC: 17 U/L (ref 10–44)
ANION GAP SERPL CALC-SCNC: 12 MMOL/L (ref 8–16)
AST SERPL-CCNC: 18 U/L (ref 10–40)
BASOPHILS # BLD AUTO: 0.01 K/UL (ref 0–0.2)
BASOPHILS # BLD AUTO: 0.01 K/UL (ref 0–0.2)
BASOPHILS NFR BLD: 0.2 % (ref 0–1.9)
BASOPHILS NFR BLD: 0.2 % (ref 0–1.9)
BILIRUB SERPL-MCNC: 0.6 MG/DL (ref 0.1–1)
BUN SERPL-MCNC: 16 MG/DL (ref 6–20)
CALCIUM SERPL-MCNC: 9.3 MG/DL (ref 8.7–10.5)
CHLORIDE SERPL-SCNC: 104 MMOL/L (ref 95–110)
CHOLEST SERPL-MCNC: 188 MG/DL (ref 120–199)
CHOLEST/HDLC SERPL: 3.6 {RATIO} (ref 2–5)
CO2 SERPL-SCNC: 23 MMOL/L (ref 23–29)
CREAT SERPL-MCNC: 0.9 MG/DL (ref 0.5–1.4)
DIFFERENTIAL METHOD: ABNORMAL
DIFFERENTIAL METHOD: ABNORMAL
EOSINOPHIL # BLD AUTO: 0.1 K/UL (ref 0–0.5)
EOSINOPHIL # BLD AUTO: 0.1 K/UL (ref 0–0.5)
EOSINOPHIL NFR BLD: 1.5 % (ref 0–8)
EOSINOPHIL NFR BLD: 1.5 % (ref 0–8)
ERYTHROCYTE [DISTWIDTH] IN BLOOD BY AUTOMATED COUNT: 11.8 % (ref 11.5–14.5)
ERYTHROCYTE [DISTWIDTH] IN BLOOD BY AUTOMATED COUNT: 11.8 % (ref 11.5–14.5)
EST. GFR  (AFRICAN AMERICAN): >60 ML/MIN/1.73 M^2
EST. GFR  (NON AFRICAN AMERICAN): >60 ML/MIN/1.73 M^2
FERRITIN SERPL-MCNC: 62 NG/ML (ref 20–300)
GLUCOSE SERPL-MCNC: 92 MG/DL (ref 70–110)
HCT VFR BLD AUTO: 42.4 % (ref 37–48.5)
HCT VFR BLD AUTO: 42.4 % (ref 37–48.5)
HDLC SERPL-MCNC: 52 MG/DL (ref 40–75)
HDLC SERPL: 27.7 % (ref 20–50)
HGB BLD-MCNC: 14.2 G/DL (ref 12–16)
HGB BLD-MCNC: 14.2 G/DL (ref 12–16)
IMM GRANULOCYTES # BLD AUTO: 0 K/UL (ref 0–0.04)
IMM GRANULOCYTES # BLD AUTO: 0 K/UL (ref 0–0.04)
IMM GRANULOCYTES NFR BLD AUTO: 0 % (ref 0–0.5)
IMM GRANULOCYTES NFR BLD AUTO: 0 % (ref 0–0.5)
IRON SERPL-MCNC: 120 UG/DL (ref 30–160)
LDLC SERPL CALC-MCNC: 102.4 MG/DL (ref 63–159)
LYMPHOCYTES # BLD AUTO: 1.1 K/UL (ref 1–4.8)
LYMPHOCYTES # BLD AUTO: 1.1 K/UL (ref 1–4.8)
LYMPHOCYTES NFR BLD: 20.6 % (ref 18–48)
LYMPHOCYTES NFR BLD: 20.6 % (ref 18–48)
MCH RBC QN AUTO: 31.3 PG (ref 27–31)
MCH RBC QN AUTO: 31.3 PG (ref 27–31)
MCHC RBC AUTO-ENTMCNC: 33.5 G/DL (ref 32–36)
MCHC RBC AUTO-ENTMCNC: 33.5 G/DL (ref 32–36)
MCV RBC AUTO: 93 FL (ref 82–98)
MCV RBC AUTO: 93 FL (ref 82–98)
MONOCYTES # BLD AUTO: 0.5 K/UL (ref 0.3–1)
MONOCYTES # BLD AUTO: 0.5 K/UL (ref 0.3–1)
MONOCYTES NFR BLD: 9.2 % (ref 4–15)
MONOCYTES NFR BLD: 9.2 % (ref 4–15)
NEUTROPHILS # BLD AUTO: 3.7 K/UL (ref 1.8–7.7)
NEUTROPHILS # BLD AUTO: 3.7 K/UL (ref 1.8–7.7)
NEUTROPHILS NFR BLD: 68.5 % (ref 38–73)
NEUTROPHILS NFR BLD: 68.5 % (ref 38–73)
NONHDLC SERPL-MCNC: 136 MG/DL
NRBC BLD-RTO: 0 /100 WBC
NRBC BLD-RTO: 0 /100 WBC
PLATELET # BLD AUTO: 163 K/UL (ref 150–350)
PLATELET # BLD AUTO: 163 K/UL (ref 150–350)
PMV BLD AUTO: 11 FL (ref 9.2–12.9)
PMV BLD AUTO: 11 FL (ref 9.2–12.9)
POTASSIUM SERPL-SCNC: 4 MMOL/L (ref 3.5–5.1)
PROT SERPL-MCNC: 7.6 G/DL (ref 6–8.4)
RBC # BLD AUTO: 4.54 M/UL (ref 4–5.4)
RBC # BLD AUTO: 4.54 M/UL (ref 4–5.4)
SATURATED IRON: 45 % (ref 20–50)
SODIUM SERPL-SCNC: 139 MMOL/L (ref 136–145)
TOTAL IRON BINDING CAPACITY: 265 UG/DL (ref 250–450)
TRANSFERRIN SERPL-MCNC: 179 MG/DL (ref 200–375)
TRIGL SERPL-MCNC: 168 MG/DL (ref 30–150)
TSH SERPL DL<=0.005 MIU/L-ACNC: 2.11 UIU/ML (ref 0.4–4)
WBC # BLD AUTO: 5.43 K/UL (ref 3.9–12.7)
WBC # BLD AUTO: 5.43 K/UL (ref 3.9–12.7)

## 2020-05-08 PROCEDURE — 85025 COMPLETE CBC W/AUTO DIFF WBC: CPT

## 2020-05-08 PROCEDURE — 84443 ASSAY THYROID STIM HORMONE: CPT

## 2020-05-08 PROCEDURE — 80053 COMPREHEN METABOLIC PANEL: CPT

## 2020-05-08 PROCEDURE — 83540 ASSAY OF IRON: CPT

## 2020-05-08 PROCEDURE — 80061 LIPID PANEL: CPT

## 2020-05-08 PROCEDURE — 36415 COLL VENOUS BLD VENIPUNCTURE: CPT

## 2020-05-08 PROCEDURE — 82728 ASSAY OF FERRITIN: CPT

## 2020-05-11 RX ORDER — LOSARTAN POTASSIUM AND HYDROCHLOROTHIAZIDE 12.5; 5 MG/1; MG/1
TABLET ORAL
Qty: 30 TABLET | Refills: 1 | Status: SHIPPED | OUTPATIENT
Start: 2020-05-11 | End: 2020-06-10

## 2020-05-27 ENCOUNTER — OFFICE VISIT (OUTPATIENT)
Dept: INTERNAL MEDICINE | Facility: CLINIC | Age: 55
End: 2020-05-27
Payer: COMMERCIAL

## 2020-05-27 VITALS
HEART RATE: 82 BPM | RESPIRATION RATE: 19 BRPM | BODY MASS INDEX: 32.33 KG/M2 | DIASTOLIC BLOOD PRESSURE: 80 MMHG | OXYGEN SATURATION: 97 % | HEIGHT: 62 IN | WEIGHT: 175.69 LBS | SYSTOLIC BLOOD PRESSURE: 111 MMHG | TEMPERATURE: 98 F

## 2020-05-27 DIAGNOSIS — E83.110 HEREDITARY HEMOCHROMATOSIS: ICD-10-CM

## 2020-05-27 DIAGNOSIS — G20.A1 PARKINSON DISEASE: Primary | ICD-10-CM

## 2020-05-27 DIAGNOSIS — E78.5 HYPERLIPIDEMIA, UNSPECIFIED HYPERLIPIDEMIA TYPE: ICD-10-CM

## 2020-05-27 DIAGNOSIS — H10.502 BLEPHAROCONJUNCTIVITIS OF LEFT EYE, UNSPECIFIED BLEPHAROCONJUNCTIVITIS TYPE: ICD-10-CM

## 2020-05-27 DIAGNOSIS — F41.9 ANXIETY: ICD-10-CM

## 2020-05-27 DIAGNOSIS — E66.9 OBESITY (BMI 30-39.9): ICD-10-CM

## 2020-05-27 PROCEDURE — 99214 OFFICE O/P EST MOD 30 MIN: CPT | Mod: S$GLB,,, | Performed by: INTERNAL MEDICINE

## 2020-05-27 PROCEDURE — 99214 PR OFFICE/OUTPT VISIT, EST, LEVL IV, 30-39 MIN: ICD-10-PCS | Mod: S$GLB,,, | Performed by: INTERNAL MEDICINE

## 2020-05-27 PROCEDURE — 99999 PR PBB SHADOW E&M-EST. PATIENT-LVL III: ICD-10-PCS | Mod: PBBFAC,,, | Performed by: INTERNAL MEDICINE

## 2020-05-27 PROCEDURE — 99999 PR PBB SHADOW E&M-EST. PATIENT-LVL III: CPT | Mod: PBBFAC,,, | Performed by: INTERNAL MEDICINE

## 2020-05-27 PROCEDURE — 3008F PR BODY MASS INDEX (BMI) DOCUMENTED: ICD-10-PCS | Mod: CPTII,S$GLB,, | Performed by: INTERNAL MEDICINE

## 2020-05-27 PROCEDURE — 3008F BODY MASS INDEX DOCD: CPT | Mod: CPTII,S$GLB,, | Performed by: INTERNAL MEDICINE

## 2020-05-27 RX ORDER — ERYTHROMYCIN 5 MG/G
OINTMENT OPHTHALMIC EVERY 8 HOURS
Qty: 1 TUBE | Refills: 0 | Status: SHIPPED | OUTPATIENT
Start: 2020-05-27 | End: 2020-07-21

## 2020-05-27 RX ORDER — ALPRAZOLAM 0.5 MG/1
TABLET ORAL
Qty: 30 TABLET | Refills: 0 | Status: SHIPPED | OUTPATIENT
Start: 2020-05-27 | End: 2020-07-13 | Stop reason: SDUPTHER

## 2020-05-27 NOTE — PROGRESS NOTES
"Subjective:       Patient ID: Rachel Leal is a 55 y.o. female.    Chief Complaint: Follow-up (6 mo)      HPI:  Patient is known to me and presents for follow up DNA positive hemochromotosis, h/o breast cancer and anxiety.  Labs from 5/8/2020 personally reviewed and discussed with the patient today.      Diagnosed with torn meniscus right knee 11/2019. Did steroid injection with ortho with relief. She then broke her left foot chasing after he dog; this is healing.      GERD: on  nexium. working well. No abd pain n/v/d/c. No dysphagia/odynophagia     H/o breast cancer with metastasis to bone (stage VI at diagnosis): dx 2013. Follows with oncology. On arimidex and xgeva; s/p left mastectomy. Doing CT scans with oncology, no MMG. she did follow with her oncologist recently. Did PET and no mets     Anxiety: Effexor and Xanax currently. Mood is well controlled. Denies depressed mood. Denies SI. Using Xanax every night;filling monthly.      HLD: on atorvastatin and omega 3. LDl trending down. No medication side effects.      Parkinson's: she is taking amantadine (was on simemet). She was given neuopro but resulted in cellulitis that responded well to antibx given at urgent care. Stopped the patch and hasn't had any further reactions. Feels she is "in slow motion" with the change. Shakes at night are a bit better and sleeping better but has definitely felt a change and will discuss with Dr. Henry.      Hemochromotosis: diagnosed with positive DNA analysis but no sympotms. Ferritin normal. She does follow with hepatology. Last US 12/2015 showed fatty infiltration otherwise normal. Last AST/ALT normal, normal bilirubin. No history of blood sugar problems. Had recent bx with hepatology as well.      Past Medical History:   Diagnosis Date    Cancer     breast stage iv, remission 2014    Fatty liver     Hereditary hemochromatosis 12/16/2015    History of TMJ syndrome     Hyperlipidemia     Hypertension     " Hypoglycemia     Osteoarthritis     Parkinson disease     Torn meniscus        Family History   Problem Relation Age of Onset    Colon cancer Mother     Diabetes Father     Heart disease Father     Hemochromatosis Brother     Cirrhosis Brother         hemochromotosis    Breast cancer Neg Hx     Ovarian cancer Neg Hx        Social History     Socioeconomic History    Marital status:      Spouse name: Not on file    Number of children: Not on file    Years of education: Not on file    Highest education level: Not on file   Occupational History    Not on file   Social Needs    Financial resource strain: Not on file    Food insecurity:     Worry: Not on file     Inability: Not on file    Transportation needs:     Medical: Not on file     Non-medical: Not on file   Tobacco Use    Smoking status: Former Smoker     Packs/day: 0.25     Types: Cigarettes     Last attempt to quit: 2013     Years since quittin.7    Smokeless tobacco: Never Used   Substance and Sexual Activity    Alcohol use: Yes     Comment: drinking 6 peers per month, no daily use     Drug use: No    Sexual activity: Yes     Partners: Male     Birth control/protection: Surgical     Comment: .   Lifestyle    Physical activity:     Days per week: Not on file     Minutes per session: Not on file    Stress: Not on file   Relationships    Social connections:     Talks on phone: Not on file     Gets together: Not on file     Attends Alevism service: Not on file     Active member of club or organization: Not on file     Attends meetings of clubs or organizations: Not on file     Relationship status: Not on file   Other Topics Concern    Not on file   Social History Narrative    Not on file       Review of Systems   Constitutional: Negative for activity change, fatigue, fever and unexpected weight change.   HENT: Negative for congestion, ear pain, hearing loss, rhinorrhea and sore throat.    Eyes: Negative for  "pain, redness and visual disturbance.   Respiratory: Negative for cough, shortness of breath and wheezing.    Cardiovascular: Negative for chest pain, palpitations and leg swelling.   Gastrointestinal: Negative for abdominal pain, constipation, diarrhea, nausea and vomiting.   Genitourinary: Negative for dysuria, frequency and urgency.   Musculoskeletal: Negative for back pain, joint swelling and neck pain.   Skin: Negative for color change, rash and wound.   Neurological: Positive for tremors. Negative for dizziness, weakness, light-headedness and headaches.        "mnoving in slow motion"         Objective:      Physical Exam   Constitutional: She is oriented to person, place, and time. She appears well-developed and well-nourished. No distress.   HENT:   Head: Normocephalic and atraumatic.   Right Ear: External ear normal.   Left Ear: External ear normal.   Eyes: Pupils are equal, round, and reactive to light. Conjunctivae and EOM are normal. Right eye exhibits no discharge. Left eye exhibits no discharge.   Neck: Neck supple. No tracheal deviation present.   Cardiovascular: Normal rate and regular rhythm.   No murmur heard.  Pulmonary/Chest: Effort normal and breath sounds normal. No respiratory distress. She has no wheezes. She has no rales.   Abdominal: Soft. Bowel sounds are normal. She exhibits no distension. There is no tenderness.   Neurological: She is alert and oriented to person, place, and time. No cranial nerve deficit.   Hand tremor noted  Speech is slowed but no slurred; all movements do appear delayed   Skin: Skin is warm and dry.   Psychiatric: She has a normal mood and affect. Her behavior is normal.   Vitals reviewed.      Assessment:       1. Parkinson disease    2. Hyperlipidemia, unspecified hyperlipidemia type    3. Obesity (BMI 30-39.9)    4. Hereditary hemochromatosis    5. Anxiety    6. Blepharoconjunctivitis of left eye, unspecified blepharoconjunctivitis type        Plan:       Rachel was " seen today for follow-up.    Diagnoses and all orders for this visit:    Parkinson disease  Chronic  Stable  She does, in fact, appear to be moving in slow motion. This has been a change with her change in medication but it is helping her tremors  Will discuss with Dr. Henry at her next visit    Hyperlipidemia, unspecified hyperlipidemia type  -     CBC auto differential; Future  -     Comprehensive metabolic panel; Future  -     TSH; Future  -     Lipid Panel; Future  -     Hemoglobin A1C; Future  Chronic stable  LDlL trended down  Cont atorvastatin same dose    Obesity (BMI 30-39.9)  -     CBC auto differential; Future  -     Comprehensive metabolic panel; Future  -     TSH; Future  -     Lipid Panel; Future  -     Hemoglobin A1C; Future  Diet and exercise    Hereditary hemochromatosis  -     CBC auto differential; Future  -     Comprehensive metabolic panel; Future  -     TSH; Future  -     Lipid Panel; Future  -     Hemoglobin A1C; Future  Chronic stable  Ferritin normal  LFTs normal    Anxiety  -     ALPRAZolam (XANAX) 0.5 MG tablet; TAKE 1 TABLET BY MOUTH EVERY DAY AT NIGHT AS NEEDED  Chronic stable  Cont meds same dose  Xanax monthly refilled.  reviewed, no discrepancies    Blepharoconjunctivitis of left eye, unspecified blepharoconjunctivitis type  -     erythromycin (ROMYCIN) ophthalmic ointment; Place into the left eye every 8 (eight) hours.  New problem  Warm compress  Call if no improvement     Health Maintenance:  -CRC: age 49 (mom and grandmother with CRC). Had polyps, unsure what kind. Done 8/2016  -PAP: s/p hysterectomy  -MMG: s/p left mastectomy, doing CT for screening  -tobacco: reports occasional use  -Vaccines: flu declines    RTC 6 months with labs and PRN

## 2020-06-04 ENCOUNTER — TELEPHONE (OUTPATIENT)
Dept: NEUROLOGY | Facility: CLINIC | Age: 55
End: 2020-06-04

## 2020-07-13 DIAGNOSIS — F41.9 ANXIETY: ICD-10-CM

## 2020-07-13 RX ORDER — ALPRAZOLAM 0.5 MG/1
TABLET ORAL
Qty: 30 TABLET | Refills: 0 | Status: SHIPPED | OUTPATIENT
Start: 2020-07-13 | End: 2020-08-28 | Stop reason: SDUPTHER

## 2020-07-13 NOTE — TELEPHONE ENCOUNTER
----- Message from Francheska Morin sent at 2020  9:56 AM CDT -----  Regarding: Rx Refill  Contact: self  Rachel Leal  MRN: [id number]  : 1965  PCP: Cata Leal  Home Phone      [phone]  Work Phone      Not on file.  Mobile          [phone]  Home Phone      [phone]      MESSAGE:    Rx refill - ALPRAZolam (XANAX) 0.5 MG tablet - Patient is out of medication.     Phone # [phone]    Pharmacy - [facility]/pharmacy #[id number] - [address] [facility] [address]

## 2020-07-16 ENCOUNTER — PATIENT OUTREACH (OUTPATIENT)
Dept: ADMINISTRATIVE | Facility: OTHER | Age: 55
End: 2020-07-16

## 2020-07-16 NOTE — PROGRESS NOTES
Requested updates within Care Everywhere.  Patient's chart was reviewed for overdue LAUREN topics.  Immunizations reconciled.    Orders placed:  Tasked appts:  Labs Linked:

## 2020-07-21 ENCOUNTER — OFFICE VISIT (OUTPATIENT)
Dept: NEUROLOGY | Facility: CLINIC | Age: 55
End: 2020-07-21
Payer: COMMERCIAL

## 2020-07-21 VITALS
WEIGHT: 173.75 LBS | SYSTOLIC BLOOD PRESSURE: 102 MMHG | DIASTOLIC BLOOD PRESSURE: 74 MMHG | HEART RATE: 74 BPM | HEIGHT: 62 IN | BODY MASS INDEX: 31.97 KG/M2 | TEMPERATURE: 98 F | RESPIRATION RATE: 16 BRPM

## 2020-07-21 DIAGNOSIS — G20.A1 PARKINSON DISEASE: Primary | ICD-10-CM

## 2020-07-21 DIAGNOSIS — R53.83 OTHER FATIGUE: ICD-10-CM

## 2020-07-21 DIAGNOSIS — E83.110 HEREDITARY HEMOCHROMATOSIS: ICD-10-CM

## 2020-07-21 DIAGNOSIS — G56.01 CARPAL TUNNEL SYNDROME OF RIGHT WRIST: ICD-10-CM

## 2020-07-21 PROCEDURE — 99214 PR OFFICE/OUTPT VISIT, EST, LEVL IV, 30-39 MIN: ICD-10-PCS | Mod: S$GLB,,, | Performed by: PSYCHIATRY & NEUROLOGY

## 2020-07-21 PROCEDURE — 3008F PR BODY MASS INDEX (BMI) DOCUMENTED: ICD-10-PCS | Mod: CPTII,S$GLB,, | Performed by: PSYCHIATRY & NEUROLOGY

## 2020-07-21 PROCEDURE — 3008F BODY MASS INDEX DOCD: CPT | Mod: CPTII,S$GLB,, | Performed by: PSYCHIATRY & NEUROLOGY

## 2020-07-21 PROCEDURE — 99214 OFFICE O/P EST MOD 30 MIN: CPT | Mod: S$GLB,,, | Performed by: PSYCHIATRY & NEUROLOGY

## 2020-07-21 PROCEDURE — 99999 PR PBB SHADOW E&M-EST. PATIENT-LVL III: CPT | Mod: PBBFAC,,, | Performed by: PSYCHIATRY & NEUROLOGY

## 2020-07-21 PROCEDURE — 99999 PR PBB SHADOW E&M-EST. PATIENT-LVL III: ICD-10-PCS | Mod: PBBFAC,,, | Performed by: PSYCHIATRY & NEUROLOGY

## 2020-07-21 RX ORDER — AMANTADINE HYDROCHLORIDE 100 MG/1
TABLET ORAL
COMMUNITY
Start: 2020-07-14 | End: 2020-07-21 | Stop reason: SDUPTHER

## 2020-07-21 NOTE — PROGRESS NOTES
HPI:  Rachel Leal is a 54 y.o. female with C spine MRI showed mild degenerative changes but some NF narrowing by Xray.  EMG showed Left Carpal tunnel syndrome (mild to moderate) and  Borderline/ Early Carpal Tunnel Syndrome on the Right now s/p Left CTR  And over 3 years of right arm jumping after stretching and sometimes decreased strength/fine movement -- progressing to right and arm leg tremor and right bradykinesia all suggesting Parkinson's disease.     Patient was started on amandatine and dose was raised to TID since the last visit. Helps the fatigue, but no bradykinesia.  Tremor is worse over the past several weeks.     She has increased sinemet to twice daily every day and tremor did not improve. She has tried without it and with less of it without benefit.     She has stopped the medication for now.     She mostly has tremor and she has some slowing movements over the past few months.   Still with fatigue  Interested in restarted sinemet  CT symptoms and neck pain  are not active        Review of Systems   Constitutional: Negative for fever.   HENT: Negative for nosebleeds.    Eyes: Negative for double vision.   Respiratory: Negative for shortness of breath.    Cardiovascular: Negative for leg swelling.   Gastrointestinal: Negative for blood in stool.   Genitourinary: Negative for hematuria.   Musculoskeletal: Negative for falls.   Skin: Negative for rash.   Neurological: Positive for tremors.   Psychiatric/Behavioral: Negative for hallucinations.       Exam:  Gen Appearance, well developed/nourished in no apparent distress  CV: 2+ distal pulses with no edema or swelling  Neuro:  MS: Awake, alert,  Sustains attention. Recent/remote memory intact, Language is full to spontaneous speech/comprehension. Fund of Knowledge is full  CN: Optic discs are flat with normal vasculature, PERRL, Extraoccular movements and visual fields are full. Normal facial sensation and strength,  Tongue and Palate are  midline and strong. Shoulder Shrug symmetric and strong.  Motor: Normal bulk, tone is increased with moderate rigidity on the right arm and mild increased done on the left. Mild bilateral resting tremor  At times in the hands,  She is bradykinetic on the right with finger tapping than left- but worse since last visit. 5/5 strength bilateral upper/lower extremities with 2+ reflexes   Sensory: symmetric to  temp, and vibration.  Romberg negative  Cerebellar: Finger-nose, Rapid alternating movements intact  Gait: Normal stance, no ataxia and no enbloc turning but reduced arm swing on the right. No shuffling and good postural reflexes.         EMG 2014:   1. Left Carpal tunnel syndrome (mild to moderate)  2. Borderline/ Early Carpal Tunnel Syndrome on the Right      12/2015 MRI brain : No significant abnormalities.   Some mild white matter disease, very mild        Assessment/Plan: Rachel Leal is a 55 y.o. female with C spine MRI showed mild degenerative changes but some NF narrowing by Xray.  EMG showed Left Carpal tunnel syndrome (mild to moderate) and  Borderline/ Early Carpal Tunnel Syndrome on the Right now s/p Left CTR  And then right arm jumping after stretching and sometimes decreased strength/fine movement -- progressing to right and arm leg tremor and right bradykinesia all suggesting Parkinson's disease.   I recommend:   1. PD thought to be genetic per movement disorder's clinic, Dr Hinson- pending another evaluation with movements disorders given worsening symptoms  2. Note her Hereditary hemochromatosis. She is a homozygote for [id number]. Note idiopathic PD has been reported in this condition along with other basal ganglia changes related to iron deposition. Her prior MRI brain was unremarkable. Hemochromatosis is currently followed and treated by hepatology  3. Mirapex and Requip caused elevated BP, she could not tolerate neuopro patch due to skin reaction.  - Hesitant to start Azilect at this time  given her side effects prior.   -Trying  amandatine for PD and likely PD related fatigue - helps fatigue more than tremor  4.  Sinemet was helpful at first, but she would like to avoid higher dosing for now  - will restart home dose 25/100mg Sinemet in the am until movement disorders appointment   5. Her Cervical neck pain is also resolved and her CTS Symptoms on the right are not active    RTC in 6 months

## 2020-07-29 ENCOUNTER — HOSPITAL ENCOUNTER (OUTPATIENT)
Dept: RADIOLOGY | Facility: HOSPITAL | Age: 55
Discharge: HOME | End: 2020-07-29
Attending: PAIN MEDICINE
Payer: COMMERCIAL

## 2020-07-29 DIAGNOSIS — M43.16 SPONDYLOLISTHESIS OF LUMBAR REGION: ICD-10-CM

## 2020-07-29 PROCEDURE — 72131 CT LUMBAR SPINE WITHOUT CONTRAST: ICD-10-PCS | Mod: 26,,, | Performed by: RADIOLOGY

## 2020-07-29 PROCEDURE — 72131 CT LUMBAR SPINE W/O DYE: CPT | Mod: TC

## 2020-07-29 PROCEDURE — 72131 CT LUMBAR SPINE W/O DYE: CPT | Mod: 26,,, | Performed by: RADIOLOGY

## 2020-08-05 ENCOUNTER — PATIENT OUTREACH (OUTPATIENT)
Dept: ADMINISTRATIVE | Facility: OTHER | Age: 55
End: 2020-08-05

## 2020-08-06 ENCOUNTER — OFFICE VISIT (OUTPATIENT)
Dept: NEUROLOGY | Facility: CLINIC | Age: 55
End: 2020-08-06
Payer: COMMERCIAL

## 2020-08-06 DIAGNOSIS — G20.A1 PARKINSON DISEASE: Primary | ICD-10-CM

## 2020-08-06 PROCEDURE — 99214 PR OFFICE/OUTPT VISIT, EST, LEVL IV, 30-39 MIN: ICD-10-PCS | Mod: S$GLB,,, | Performed by: PSYCHIATRY & NEUROLOGY

## 2020-08-06 PROCEDURE — 99214 OFFICE O/P EST MOD 30 MIN: CPT | Mod: S$GLB,,, | Performed by: PSYCHIATRY & NEUROLOGY

## 2020-08-06 RX ORDER — ROPINIROLE 0.25 MG/1
1 TABLET, FILM COATED ORAL 3 TIMES DAILY
Qty: 360 TABLET | Refills: 11 | Status: SHIPPED | OUTPATIENT
Start: 2020-08-06 | End: 2020-10-09

## 2020-08-06 NOTE — PROGRESS NOTES
"  MOVEMENT DISORDERS CLINIC NEW CONSULT NOTE    PCP/Referring Provider: Cande Self  No address on file  Date of Service: 8/6/2020    Chief Complaint: PD    HPI: Rachel Leal is a R HANDED 55 y.o. female with a medical issues significant for hemochromatosis, HTN, HL, Breast Cancer s/p chemotherapy, who presents with PDism. Notes her PDism began at age 53. R sided rigidity and arm stiffness began and progressed. "Just like mom." At this point has a moderate R hand resting tremor and     Started carbidopa/levodopa 25/100mg 1 tab daily 2017  Currently on Amantadine 100mg Po TID. This helps her tremor. 8/2/PM  No cognitive issues or leg swelling.  Has 4 hour ONtimes with Amantadine  When she was on both carbidopa/levodopa and amantadine she "felt worse"    R foot inversions when OFF  No orthostasis  No gambling tesnencies    French descent  Mother and grandmother had PDism    Review of Systems:   Review of Systems   Constitutional: Negative for fever.   HENT: Negative for congestion.    Eyes: Negative for double vision.   Respiratory: Negative for cough and shortness of breath.    Cardiovascular: Negative for chest pain and leg swelling.   Gastrointestinal: Negative for nausea.   Genitourinary: Negative for dysuria.   Musculoskeletal: Negative for falls.   Skin: Negative for rash.   Neurological: Positive for tremors and speech change. Negative for headaches.   Psychiatric/Behavioral: Negative for depression.         Current Medications:  Outpatient Encounter Medications as of 8/6/2020   Medication Sig Dispense Refill    ALPRAZolam (XANAX) 0.5 MG tablet TAKE 1 TABLET BY MOUTH EVERY DAY AT NIGHT AS NEEDED 30 tablet 0    amantadine HCL (SYMMETREL) 100 mg capsule Take 1 capsule (100 mg total) by mouth 3 (three) times daily. 90 capsule 11    anastrozole (ARIMIDEX) 1 mg Tab Take 1 mg by mouth once daily.      atorvastatin (LIPITOR) 40 MG tablet Take 1 tablet (40 mg total) by mouth once daily. 90 tablet 3    " COCONUT OIL ORAL Take 2 capsules by mouth once daily.      denosumab (XGEVA) 120 mg/1.7 mL (70 mg/mL) Soln Inject 120 mg into the skin. Every 3 months      esomeprazole (NEXIUM) 20 MG capsule Take 1 capsule (20 mg total) by mouth as needed. 30 capsule 0    losartan-hydrochlorothiazide 50-12.5 mg (HYZAAR) 50-12.5 mg per tablet TAKE 1 TABLET BY MOUTH EVERY DAY 30 tablet 1    rOPINIRole (REQUIP) 0.25 MG tablet Take 4 tablets (1 mg total) by mouth 3 (three) times daily. 360 tablet 11    venlafaxine (EFFEXOR-XR) 75 MG 24 hr capsule Take 75 mg by mouth once daily.       vitamin D 1000 units Tab Take 1,000 mg by mouth once daily.        No facility-administered encounter medications on file as of 2020.        Past Medical History:  Patient Active Problem List   Diagnosis    Hyperlipidemia    Hx of breast cancer    Hereditary hemochromatosis    Anxiety    Parkinson disease    Fatty liver    Obesity (BMI 30-39.9)    Condyloma       Past Surgical History:  Past Surgical History:   Procedure Laterality Date    BREAST SURGERY      augmentation     SECTION      COLONOSCOPY      EXCISION OF CONDYLOMA N/A 2019    Procedure: EXCISION OF ANAL CANAL CONDYLOMA;  Surgeon: Yunior Starks MD;  Location: [facility];  Service: Colon and Rectal;  Laterality: N/A;    EYE SURGERY      FULGURATION OF ANAL CONDYLOMA N/A 2019    Procedure: FULGURATION OF ANAL CANAL CONDYLOMA;  Surgeon: Yunior tSarks MD;  Location: [facility] OR;  Service: Colon and Rectal;  Laterality: N/A;    HAND SURGERY      [facility]STERECTOMY      [facility]    LIVER BIOPSY  2017    Mild steatosis, macrovesicular 20% and microvesicular 10%. Mild to moderate siderosis, 2+, within hepatocytes. No fibrosis    MASTECTOMY Left     TONSILLECTOMY         Current Living Situation: home    Social:  Social History     Socioeconomic History    Marital status:      Spouse name: Not on file    Number of children: Not on file    Years of  education: Not on file    Highest education level: Not on file   Occupational History    Not on file   Social Needs    Financial resource strain: Not on file    Food insecurity     Worry: Not on file     Inability: Not on file    Transportation needs     Medical: Not on file     Non-medical: Not on file   Tobacco Use    Smoking status: Former Smoker     Packs/day: 0.25     Types: Cigarettes     Quit date: 2013     Years since quittin.9    Smokeless tobacco: Never Used   Substance and Sexual Activity    Alcohol use: Yes     Comment: drinking 6 peers per month, no daily use     Drug use: No    Sexual activity: Yes     Partners: Male     Birth control/protection: Surgical     Comment: .   Lifestyle    Physical activity     Days per week: Not on file     Minutes per session: Not on file    Stress: Not on file   Relationships    Social connections     Talks on phone: Not on file     Gets together: Not on file     Attends Islam service: Not on file     Active member of club or organization: Not on file     Attends meetings of clubs or organizations: Not on file     Relationship status: Not on file   Other Topics Concern    Not on file   Social History Narrative    Not on file       Family History:  Family History   Problem Relation Age of Onset    Colon cancer Mother     Diabetes Father     Heart disease Father     Hemochromatosis Brother     Cirrhosis Brother         hemochromotosis    Breast cancer Neg Hx     Ovarian cancer Neg Hx        PHYSICAL:  LMP 2012     General Medical Examination:  General: Good hygiene, appropriate appearance.  HEENT: Normocephalic, atraumatic.   Neck: Supple.   Chest: Unlabored breathing.   CV: Symmetric pulses.   Ext: No clubbing, cyanosis, or edema.     Mental Status:  Mood/Affect: Appropriate/congruent.  Level of consciousness: Awake, alert.  Orientation: Oriented to person, place, time and situation.  Language: No Dysarthria    Cranial  nerves:  I: Not tested  II: PERRL, VFF to counting  III, IV, VI: EOMI with conjugate gaze and no nystagmus on end gaze - NO SWJs  V: Facial sensation intact and symmetric over the bilateral V1-V3  VII: Facial muscle activation intact and symmetric over the bilateral upper and lower face  VIII: Hearing intact in the b/l ears and symmetrical to finger rub  IX, X, XII: TUP midline - no atrophy or fasiculations  X: SCMs and shoulder shrug full strength b/l and symmetric    Motor:   -UE: 5/5 deltoids; 5/5 biceps, triceps; 5/5 wrist flexors, extensors; 5/5 interosseous; 5/5   -LEs: 5/5 hip flexion, extension; 5/5 knee flexion, extension; 5/5 ankle flexion, extension      DTRs:  ? Biceps Triceps Brachioradialis Knee Ankle   Left 2+ 2+ 2+ 2+ 2+   Right 2+ 2+ 2+ 2+ 2+       ? Finger taps Finger flicks RAE Heel taps   Left 2+ 2+ 2+ 2+   Right 3+ 3+ 3+ 3+      tone:  ? Arm Leg   Left 1+ 1+   Right 3+ 2+     Sensation:   -Light touch: Intact and symmetric in the bilateral upper and lower extremities.  -Temp: Intact and symmetric in the bilateral upper and lower extremities.  -Vibration: Intact and symmetric in the bilateral upper and lower extremities.  -Pin prick: Intact and symmetric in the bilateral upper and lower extremities.  -Proprioception: Intact and symmetric in the bilateral upper and lower extremities.    Coordination:   -Finger to nose: nl    Gait:  -Arises from chair with use of hands.  -Stoop is mild  -Casual gait is: narrow based  Holds both arms mid flexed  Mild shuffle  -Turning: nl  -FOG: neg  No dyskinesias      Laboratory Data:  NA    Imaging:  Reported as NL    Assessment//Plan:   Problem List Items Addressed This Visit        Neuro    Parkinson disease - Primary    Current Assessment & Plan     Parkinsonism, familial autosomal dominant. Mother has prominent BG calcifications - possible Fahr's dz. Rachel is under-dosed at them moment. Unclear why she did not tolerate carbidopa/levodopa. Suggested  Roprinirole slow uptitration up to 1mg TID  Continue amantadine 100mg PO BID  F/u ceruloplasmin         Relevant Orders    Ceruloplasmin        Anu Gresham MD, MS [facility]  Department of Neurology  Movement Disorders

## 2020-08-06 NOTE — PROGRESS NOTES
Requested updates within Care Everywhere.  Patient's chart was reviewed for overdue LAUREN topics.  Immunizations reconciled.

## 2020-08-07 NOTE — ASSESSMENT & PLAN NOTE
Parkinsonism, familial autosomal dominant. Mother has prominent BG calcifications - possible Fahr's dz. Rachel is under-dosed at them moment. Unclear why she did not tolerate carbidopa/levodopa. Suggested Roprinirole slow uptitration up to 1mg TID  Continue amantadine 100mg PO BID  F/u ceruloplasmin

## 2020-08-28 DIAGNOSIS — F41.9 ANXIETY: ICD-10-CM

## 2020-08-28 RX ORDER — ALPRAZOLAM 0.5 MG/1
TABLET ORAL
Qty: 30 TABLET | Refills: 0 | Status: SHIPPED | OUTPATIENT
Start: 2020-08-28 | End: 2020-10-02 | Stop reason: SDUPTHER

## 2020-09-09 ENCOUNTER — PATIENT OUTREACH (OUTPATIENT)
Dept: ADMINISTRATIVE | Facility: OTHER | Age: 55
End: 2020-09-09

## 2020-09-09 NOTE — PROGRESS NOTES
Care Everywhere:  Immunization: updated  Health Maintenance: updated  Media Review:   Legacy Review:   Order placed:   Upcoming appts:

## 2020-09-11 ENCOUNTER — OFFICE VISIT (OUTPATIENT)
Dept: NEUROLOGY | Facility: CLINIC | Age: 55
End: 2020-09-11
Payer: COMMERCIAL

## 2020-09-11 DIAGNOSIS — G20.A1 PARKINSON DISEASE: ICD-10-CM

## 2020-09-11 PROCEDURE — 99214 PR OFFICE/OUTPT VISIT, EST, LEVL IV, 30-39 MIN: ICD-10-PCS | Mod: 95,,, | Performed by: PSYCHIATRY & NEUROLOGY

## 2020-09-11 PROCEDURE — 99214 OFFICE O/P EST MOD 30 MIN: CPT | Mod: 95,,, | Performed by: PSYCHIATRY & NEUROLOGY

## 2020-09-11 NOTE — ASSESSMENT & PLAN NOTE
Parkinsonism, familial autosomal dominant. Mother has prominent BG calcifications - possible Fahr's dz. Rachel is under-dosed at them moment. Unclear why she did not tolerate ropinirole but likely increased too quickly. Suggested Roprinirole slow uptitration up to 1mg TID  Continue amantadine 100mg PO BID  F/u ceruloplasmin

## 2020-09-11 NOTE — PROGRESS NOTES
"The patient location is: home  The chief complaint leading to consultation is: PD    Visit type: audiovisual    Face to Face time with patient: 20mins  20 minutes of total time spent on the encounter, which includes face to face time and non-face to face time preparing to see the patient (eg, review of tests), Obtaining and/or reviewing separately obtained history, Documenting clinical information in the electronic or other health record, Independently interpreting results (not separately reported) and communicating results to the patient/family/caregiver, or Care coordination (not separately reported).         Each patient to whom he or she provides medical services by telemedicine is:  (1) informed of the relationship between the physician and patient and the respective role of any other health care provider with respect to management of the patient; and (2) notified that he or she may decline to receive medical services by telemedicine and may withdraw from such care at any time.    Notes:     [facility]    PCP/Referring Provider: No referring provider defined for this encounter.  Date of Service: 9/11/2020    Chief Complaint: PD    Interval Hx    Since last visit,   Continues amantadine 100mg PO TID  Started ropinirole 0.25mg PO TID and felt better but at 0.5mg PO TID felt worse, was feeling weak and stopped  R foot still curling in    No hallucinations or dizziness noted    Falls: none  Assist Device: none    "PriorHPI: Rachel Leal is a R HANDED 55 y.o. female with a medical issues significant for hemochromatosis, HTN, HL, Breast Cancer s/p chemotherapy, who presents with PDism. Notes her PDism began at age 53. R sided rigidity and arm stiffness began and progressed. "Just like mom." At this point has a moderate R hand resting tremor and     Started carbidopa/levodopa 25/100mg 1 tab daily 2017  Currently on Amantadine 100mg Po TID. This helps her tremor. 8/2/PM  No cognitive issues or " "leg swelling.  Has 4 hour ONtimes with Amantadine  When she was on both carbidopa/levodopa and amantadine she "felt worse"    R foot inversions when OFF  No orthostasis  No gambling tesnencies    French descent  Mother and grandmother had PDism"    Review of Systems:   Review of Systems   Constitutional: Negative for fever.   HENT: Negative for congestion.    Eyes: Negative for double vision.   Respiratory: Negative for cough and shortness of breath.    Cardiovascular: Negative for chest pain and leg swelling.   Gastrointestinal: Negative for nausea.   Genitourinary: Negative for dysuria.   Musculoskeletal: Negative for falls.   Skin: Negative for rash.   Neurological: Positive for tremors and speech change. Negative for headaches.   Psychiatric/Behavioral: Negative for depression.         Current Medications:  Outpatient Encounter Medications as of 9/11/2020   Medication Sig Dispense Refill    ALPRAZolam (XANAX) 0.5 MG tablet TAKE 1 TABLET BY MOUTH EVERY DAY AT NIGHT AS NEEDED 30 tablet 0    amantadine HCL (SYMMETREL) 100 mg capsule Take 1 capsule (100 mg total) by mouth 3 (three) times daily. 90 capsule 11    anastrozole (ARIMIDEX) 1 mg Tab Take 1 mg by mouth once daily.      atorvastatin (LIPITOR) 40 MG tablet Take 1 tablet (40 mg total) by mouth once daily. 90 tablet 3    COCONUT OIL ORAL Take 2 capsules by mouth once daily.      denosumab (XGEVA) 120 mg/1.7 mL (70 mg/mL) Soln Inject 120 mg into the skin. Every 3 months      esomeprazole (NEXIUM) 20 MG capsule Take 1 capsule (20 mg total) by mouth as needed. 30 capsule 0    losartan-hydrochlorothiazide 50-12.5 mg (HYZAAR) 50-12.5 mg per tablet TAKE 1 TABLET BY MOUTH EVERY DAY 30 tablet 1    rOPINIRole (REQUIP) 0.25 MG tablet Take 4 tablets (1 mg total) by mouth 3 (three) times daily. 360 tablet 11    venlafaxine (EFFEXOR-XR) 75 MG 24 hr capsule Take 75 mg by mouth once daily.       vitamin D 1000 units Tab Take 1,000 mg by mouth once daily.        No " facility-administered encounter medications on file as of 2020.        Past Medical History:  Patient Active Problem List   Diagnosis    Hyperlipidemia    Hx of breast cancer    Hereditary hemochromatosis    Anxiety    Parkinson disease    Fatty liver    Obesity (BMI 30-39.9)    Condyloma       Past Surgical History:  Past Surgical History:   Procedure Laterality Date    BREAST SURGERY      augmentation     SECTION      COLONOSCOPY      EXCISION OF CONDYLOMA N/A 2019    Procedure: EXCISION OF ANAL CANAL CONDYLOMA;  Surgeon: Yunior Starks MD;  Location: [facility];  Service: Colon and Rectal;  Laterality: N/A;    EYE SURGERY      FULGURATION OF ANAL CONDYLOMA N/A 2019    Procedure: FULGURATION OF ANAL CANAL CONDYLOMA;  Surgeon: Yunior Starks MD;  Location: [facility];  Service: Colon and Rectal;  Laterality: N/A;    HAND SURGERY      HYSTERECTOMY      [facility]SO    LIVER BIOPSY  2017    Mild steatosis, macrovesicular 20% and microvesicular 10%. Mild to moderate siderosis, 2+, within hepatocytes. No fibrosis    MASTECTOMY Left     TONSILLECTOMY         Current Living Situation: home    Social:  Social History     Socioeconomic History    Marital status:      Spouse name: Not on file    Number of children: Not on file    Years of education: Not on file    Highest education level: Not on file   Occupational History    Not on file   Social Needs    Financial resource strain: Not on file    Food insecurity     Worry: Not on file     Inability: Not on file    Transportation needs     Medical: Not on file     Non-medical: Not on file   Tobacco Use    Smoking status: Former Smoker     Packs/day: 0.25     Types: Cigarettes     Quit date: 2013     Years since quittin.0    Smokeless tobacco: Never Used   Substance and Sexual Activity    Alcohol use: Yes     Comment: drinking 6 peers per month, no daily use     Drug use: No    Sexual activity: Yes     Partners:  Male     Birth control/protection: Surgical     Comment: .   Lifestyle    Physical activity     Days per week: Not on file     Minutes per session: Not on file    Stress: Not on file   Relationships    Social connections     Talks on phone: Not on file     Gets together: Not on file     Attends Worship service: Not on file     Active member of club or organization: Not on file     Attends meetings of clubs or organizations: Not on file     Relationship status: Not on file   Other Topics Concern    Not on file   Social History Narrative    Not on file       Family History:  Family History   Problem Relation Age of Onset    Colon cancer Mother     Diabetes Father     Heart disease Father     Hemochromatosis Brother     Cirrhosis Brother         hemochromotosis    Breast cancer Neg Hx     Ovarian cancer Neg Hx        PHYSICAL:  LMP 01/17/2012     Physical Exam  Constitutional: Well-developed, well-nourished, appears stated age  Eyes: No scleral icterus  ENT: Moist oral mucosa  Cardiovascular: No lower extremity edema   Respiratory: No labored breathing   Skin: No rash   Hematologic: No bruising    Other: GI/ deferred   · Mental status: Alert and oriented to person, place, time, and situation;   · follows commands  · Speech: normal (not dysarthric), no aphasia  · Cranial nerves:            · CN II: Pupils mid-position and equal, not tested light or accommodation  · CN III, IV, VI: Extraocular movements full, no nystagmus visualized  · CN V: Not tested   · CN VII: Face strong and symmetric bilaterally   · CN VIII: Hearing intact to voice and conversation   · CN IX, X: Palate raises midline and symmetric   · CN XI: Strong shoulder shrug B/L  · CN XII: Tongue appears midline   · Motor: Normal bulk by appearance, no drift   · Sensory: Not tested    · Gait: Not tested  · Deep tendon reflexes: Not tested  · Movement/Coordination                    Mod hypophonic speech.                     Mod facial  masking.  No bradykinesia.                   No tremor with rest, posture, kinesis, or intention.                    ? Finger taps Finger flicks RAE Heel taps   Left 2+ 2+ 2+ 2+   Right 3+ 3+ 3+ 3+         Laboratory Data:  NA    Imaging:  Reported as NL    Assessment//Plan:   Problem List Items Addressed This Visit        Neuro    Parkinson disease    Current Assessment & Plan     Parkinsonism, familial autosomal dominant. Mother has prominent BG calcifications - possible Fahr's dz. Rachel is under-dosed at them moment. Unclear why she did not tolerate ropinirole but likely increased too quickly. Suggested Roprinirole slow uptitration up to 1mg TID  Continue amantadine 100mg PO BID  F/u ceruloplasmin             Anu Gresham MD, MS [facility]  Department of Neurology  Movement Disorders

## 2020-09-18 ENCOUNTER — PATIENT MESSAGE (OUTPATIENT)
Dept: NEUROLOGY | Facility: CLINIC | Age: 55
End: 2020-09-18

## 2020-10-02 ENCOUNTER — PATIENT MESSAGE (OUTPATIENT)
Dept: INTERNAL MEDICINE | Facility: CLINIC | Age: 55
End: 2020-10-02

## 2020-10-02 DIAGNOSIS — F41.9 ANXIETY: ICD-10-CM

## 2020-10-02 RX ORDER — ALPRAZOLAM 0.5 MG/1
TABLET ORAL
Qty: 30 TABLET | Refills: 0 | Status: SHIPPED | OUTPATIENT
Start: 2020-10-02 | End: 2020-12-16 | Stop reason: SDUPTHER

## 2020-10-07 ENCOUNTER — TELEPHONE (OUTPATIENT)
Dept: NEUROLOGY | Facility: CLINIC | Age: 55
End: 2020-10-07
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
## 2020-10-09 ENCOUNTER — OFFICE VISIT (OUTPATIENT)
Dept: NEUROLOGY | Facility: CLINIC | Age: 55
End: 2020-10-09
Payer: COMMERCIAL

## 2020-10-09 ENCOUNTER — PATIENT MESSAGE (OUTPATIENT)
Dept: NEUROLOGY | Facility: CLINIC | Age: 55
End: 2020-10-09

## 2020-10-09 DIAGNOSIS — G20.A1 PARKINSON DISEASE: Primary | ICD-10-CM

## 2020-10-09 PROCEDURE — 99214 OFFICE O/P EST MOD 30 MIN: CPT | Mod: 95,,, | Performed by: PSYCHIATRY & NEUROLOGY

## 2020-10-09 PROCEDURE — 99214 PR OFFICE/OUTPT VISIT, EST, LEVL IV, 30-39 MIN: ICD-10-PCS | Mod: 95,,, | Performed by: PSYCHIATRY & NEUROLOGY

## 2020-10-09 RX ORDER — CARBIDOPA AND LEVODOPA 25; 100 MG/1; MG/1
1 TABLET ORAL 3 TIMES DAILY
Qty: 90 TABLET | Refills: 11 | Status: SHIPPED | OUTPATIENT
Start: 2020-10-09 | End: 2021-08-17

## 2020-10-09 NOTE — PROGRESS NOTES
"The patient location is: home  The chief complaint leading to consultation is: PD    Visit type: audiovisual    Face to Face time with patient: 20mins  20 minutes of total time spent on the encounter, which includes face to face time and non-face to face time preparing to see the patient (eg, review of tests), Obtaining and/or reviewing separately obtained history, Documenting clinical information in the electronic or other health record, Independently interpreting results (not separately reported) and communicating results to the patient/family/caregiver, or Care coordination (not separately reported).         Each patient to whom he or she provides medical services by telemedicine is:  (1) informed of the relationship between the physician and patient and the respective role of any other health care provider with respect to management of the patient; and (2) notified that he or she may decline to receive medical services by telemedicine and may withdraw from such care at any time.    Notes:     [facility]    PCP/Referring Provider: No referring provider defined for this encounter.  Date of Service: 10/9/2020    Chief Complaint: PD    Interval Hx    Since last visit,     Did poorly with ropinirole - stopped    Continues amantadine 100mg PO TID  Started carbidopa/levodopa 25/100mg 1 tab PO BID   No dizziness, nausea, halluciantions  Some insomnia    Walking better  Hand dexterity is better    Falls: none  Assist Device: none    Med HX  Started ropinirole 0.25mg PO TID and felt better but at 0.5mg PO TID felt worse, was feeling weak and stopped    "PriorHPI: Rachel Leal is a R HANDED 55 y.o. female with a medical issues significant for hemochromatosis, HTN, HL, Breast Cancer s/p chemotherapy, who presents with PDism. Notes her PDism began at age 53. R sided rigidity and arm stiffness began and progressed. "Just like mom." At this point has a moderate R hand resting tremor and     Started " "carbidopa/levodopa 25/100mg 1 tab daily 2017  Currently on Amantadine 100mg Po TID. This helps her tremor. 8/2/PM  No cognitive issues or leg swelling.  Has 4 hour ONtimes with Amantadine  When she was on both carbidopa/levodopa and amantadine she "felt worse"    R foot inversions when OFF  No orthostasis  No gambling tesnencies    [place] descent  Mother and grandmother had PDism"    Review of Systems:   Review of Systems   Constitutional: Negative for fever.   HENT: Negative for congestion.    Eyes: Negative for double vision.   Respiratory: Negative for cough and shortness of breath.    Cardiovascular: Negative for chest pain and leg swelling.   Gastrointestinal: Negative for nausea.   Genitourinary: Negative for dysuria.   Musculoskeletal: Negative for falls.   Skin: Negative for rash.   Neurological: Positive for tremors and speech change. Negative for headaches.   Psychiatric/Behavioral: Negative for depression.         Current Medications:  Outpatient Encounter Medications as of 10/9/2020   Medication Sig Dispense Refill    ALPRAZolam (XANAX) 0.5 MG tablet TAKE 1 TABLET BY MOUTH EVERY DAY AT NIGHT AS NEEDED 30 tablet 0    amantadine HCL (SYMMETREL) 100 mg capsule Take 1 capsule (100 mg total) by mouth 3 (three) times daily. 90 capsule 11    anastrozole (ARIMIDEX) 1 mg Tab Take 1 mg by mouth once daily.      atorvastatin (LIPITOR) 40 MG tablet Take 1 tablet (40 mg total) by mouth once daily. 90 tablet 3    carbidopa-levodopa  mg (SINEMET)  mg per tablet Take 1 tablet by mouth 3 (three) times daily. 90 tablet 11    COCONUT OIL ORAL Take 2 capsules by mouth once daily.      denosumab (XGEVA) 120 mg/1.7 mL (70 mg/mL) Soln Inject 120 mg into the skin. Every 3 months      esomeprazole (NEXIUM) 20 MG capsule Take 1 capsule (20 mg total) by mouth as needed. 30 capsule 0    losartan-hydrochlorothiazide 50-12.5 mg (HYZAAR) 50-12.5 mg per tablet TAKE 1 TABLET BY MOUTH EVERY DAY 30 tablet 1    " venlafaxine (EFFEXOR-XR) 75 MG 24 hr capsule Take 75 mg by mouth once daily.       vitamin D 1000 units Tab Take 1,000 mg by mouth once daily.       [DISCONTINUED] rOPINIRole (REQUIP) 0.25 MG tablet Take 4 tablets (1 mg total) by mouth 3 (three) times daily. 360 tablet 11     No facility-administered encounter medications on file as of 10/9/2020.        Past Medical History:  Patient Active Problem List   Diagnosis    Hyperlipidemia    Hx of breast cancer    Hereditary hemochromatosis    Anxiety    Parkinson disease    Fatty liver    Obesity (BMI 30-39.9)    Condyloma       Past Surgical History:  Past Surgical History:   Procedure Laterality Date    BREAST SURGERY      augmentation     SECTION      COLONOSCOPY      EXCISION OF CONDYLOMA N/A 2019    Procedure: EXCISION OF ANAL CANAL CONDYLOMA;  Surgeon: Yunior Starks MD;  Location: [facility] OR;  Service: Colon and Rectal;  Laterality: N/A;    EYE SURGERY      FULGURATION OF ANAL CONDYLOMA N/A 2019    Procedure: FULGURATION OF ANAL CANAL CONDYLOMA;  Surgeon: Yunior Starks MD;  Location: [facility];  Service: Colon and Rectal;  Laterality: N/A;    HAND SURGERY      HYSTERECTOMY      TL BSO    LIVER BIOPSY  2017    Mild steatosis, macrovesicular 20% and microvesicular 10%. Mild to moderate siderosis, 2+, within hepatocytes. No fibrosis    MASTECTOMY Left     TONSILLECTOMY         Current Living Situation: home    Social:  Social History     Socioeconomic History    Marital status:      Spouse name: Not on file    Number of children: Not on file    Years of education: Not on file    Highest education level: Not on file   Occupational History    Not on file   Social Needs    Financial resource strain: Not on file    Food insecurity     Worry: Not on file     Inability: Not on file    Transportation needs     Medical: Not on file     Non-medical: Not on file   Tobacco Use    Smoking status: Former Smoker     Packs/day:  0.25     Types: Cigarettes     Quit date: 2013     Years since quittin.0    Smokeless tobacco: Never Used   Substance and Sexual Activity    Alcohol use: Yes     Comment: drinking 6 peers per month, no daily use     Drug use: No    Sexual activity: Yes     Partners: Male     Birth control/protection: Surgical     Comment: .   Lifestyle    Physical activity     Days per week: Not on file     Minutes per session: Not on file    Stress: Not on file   Relationships    Social connections     Talks on phone: Not on file     Gets together: Not on file     Attends Jew service: Not on file     Active member of club or organization: Not on file     Attends meetings of clubs or organizations: Not on file     Relationship status: Not on file   Other Topics Concern    Not on file   Social History Narrative    Not on file       Family History:  Family History   Problem Relation Age of Onset    Colon cancer Mother     Diabetes Father     Heart disease Father     Hemochromatosis Brother     Cirrhosis Brother         hemochromotosis    Breast cancer Neg Hx     Ovarian cancer Neg Hx        PHYSICAL:  LMP 2012     Physical Exam  Constitutional: Well-developed, well-nourished, appears stated age  Eyes: No scleral icterus  ENT: Moist oral mucosa  Cardiovascular: No lower extremity edema   Respiratory: No labored breathing   Skin: No rash   Hematologic: No bruising    Other: GI/ deferred   · Mental status: Alert and oriented to person, place, time, and situation;   · follows commands  · Speech: normal (not dysarthric), no aphasia  · Cranial nerves:            · CN II: Pupils mid-position and equal, not tested light or accommodation  · CN III, IV, VI: Extraocular movements full, no nystagmus visualized  · CN V: Not tested   · CN VII: Face strong and symmetric bilaterally   · CN VIII: Hearing intact to voice and conversation   · CN IX, X: Palate raises midline and symmetric   · CN XI: Strong  shoulder shrug B/L  · CN XII: Tongue appears midline   · Motor: Normal bulk by appearance, no drift   · Sensory: Not tested    · Gait: Not tested  · Deep tendon reflexes: Not tested  · Movement/Coordination                    Mod hypophonic speech.                     Mod facial masking.  No bradykinesia.                   No tremor with rest, posture, kinesis, or intention.                    ? Finger taps Finger flicks RAE Heel taps   Left 2+ 2+ 2+ 2+   Right 2+ 2+ 2+ 2+     Laboratory Data:  NA    Imaging:  Reported as NL    Assessment//Plan:   Problem List Items Addressed This Visit        Neuro    Parkinson disease - Primary    Current Assessment & Plan     Parkinsonism, familial autosomal dominant. Mother has prominent BG calcifications - possible Fahr's dz. Rachel is under-dosed at them moment. Unclear why she did not tolerate ropinirole but likely increased too quickly.   Better on carbidopa/levodopa therapy but need dose timing adjustment    Suggested carbidopa/levodopa 25/100mg 1 tab PO TID and amantadine 100mg AM and 12PM ( to avoid insomnia)         Relevant Orders    Ambulatory referral/consult to Physical/Occupational Therapy    Ambulatory referral/consult to Physical/Occupational Therapy        Anu Gresham MD, MS [facility]  Department of Neurology  Movement Disorders

## 2020-10-09 NOTE — ASSESSMENT & PLAN NOTE
Parkinsonism, familial autosomal dominant. Mother has prominent BG calcifications - possible Fahr's dz. Rachel is under-dosed at them moment. Unclear why she did not tolerate ropinirole but likely increased too quickly.   Better on carbidopa/levodopa therapy but need dose timing adjustment    Suggested carbidopa/levodopa 25/100mg 1 tab PO TID and amantadine 100mg AM and 12PM ( to avoid insomnia)

## 2020-10-27 ENCOUNTER — PATIENT MESSAGE (OUTPATIENT)
Dept: NEUROLOGY | Facility: CLINIC | Age: 55
End: 2020-10-27

## 2020-10-27 NOTE — TELEPHONE ENCOUNTER
Placed call to [facility] about pt's   carbidopa-levodopa  mg (SINEMET)  mg per tablet. Pharmacy says that is went thru now.

## 2020-10-30 ENCOUNTER — PATIENT MESSAGE (OUTPATIENT)
Dept: NEUROLOGY | Facility: CLINIC | Age: 55
End: 2020-10-30

## 2020-11-06 ENCOUNTER — PATIENT MESSAGE (OUTPATIENT)
Dept: INTERNAL MEDICINE | Facility: CLINIC | Age: 55
End: 2020-11-06

## 2020-11-06 DIAGNOSIS — E83.110 HEREDITARY HEMOCHROMATOSIS: Primary | ICD-10-CM

## 2020-11-09 ENCOUNTER — LAB VISIT (OUTPATIENT)
Dept: LAB | Facility: HOSPITAL | Age: 55
End: 2020-11-09
Attending: INTERNAL MEDICINE
Payer: COMMERCIAL

## 2020-11-09 DIAGNOSIS — E66.9 OBESITY (BMI 30-39.9): ICD-10-CM

## 2020-11-09 DIAGNOSIS — E78.5 HYPERLIPIDEMIA, UNSPECIFIED HYPERLIPIDEMIA TYPE: ICD-10-CM

## 2020-11-09 DIAGNOSIS — E83.110 HEREDITARY HEMOCHROMATOSIS: ICD-10-CM

## 2020-11-09 LAB
ALBUMIN SERPL BCP-MCNC: 3.8 G/DL (ref 3.5–5.2)
ALP SERPL-CCNC: 79 U/L (ref 55–135)
ALT SERPL W/O P-5'-P-CCNC: <5 U/L (ref 10–44)
ANION GAP SERPL CALC-SCNC: 11 MMOL/L (ref 8–16)
AST SERPL-CCNC: 16 U/L (ref 10–40)
BASOPHILS # BLD AUTO: 0.02 K/UL (ref 0–0.2)
BASOPHILS NFR BLD: 0.3 % (ref 0–1.9)
BILIRUB SERPL-MCNC: 0.4 MG/DL (ref 0.1–1)
BUN SERPL-MCNC: 14 MG/DL (ref 6–20)
CALCIUM SERPL-MCNC: 8.7 MG/DL (ref 8.7–10.5)
CHLORIDE SERPL-SCNC: 103 MMOL/L (ref 95–110)
CHOLEST SERPL-MCNC: 182 MG/DL (ref 120–199)
CHOLEST/HDLC SERPL: 3.5 {RATIO} (ref 2–5)
CO2 SERPL-SCNC: 26 MMOL/L (ref 23–29)
CREAT SERPL-MCNC: 0.8 MG/DL (ref 0.5–1.4)
DIFFERENTIAL METHOD: ABNORMAL
EOSINOPHIL # BLD AUTO: 0.1 K/UL (ref 0–0.5)
EOSINOPHIL NFR BLD: 0.8 % (ref 0–8)
ERYTHROCYTE [DISTWIDTH] IN BLOOD BY AUTOMATED COUNT: 11.9 % (ref 11.5–14.5)
EST. GFR  (AFRICAN AMERICAN): >60 ML/MIN/1.73 M^2
EST. GFR  (NON AFRICAN AMERICAN): >60 ML/MIN/1.73 M^2
ESTIMATED AVG GLUCOSE: 100 MG/DL (ref 68–131)
FERRITIN SERPL-MCNC: 66 NG/ML (ref 20–300)
GLUCOSE SERPL-MCNC: 87 MG/DL (ref 70–110)
HBA1C MFR BLD HPLC: 5.1 % (ref 4–5.6)
HCT VFR BLD AUTO: 40.5 % (ref 37–48.5)
HDLC SERPL-MCNC: 52 MG/DL (ref 40–75)
HDLC SERPL: 28.6 % (ref 20–50)
HGB BLD-MCNC: 13.4 G/DL (ref 12–16)
IMM GRANULOCYTES # BLD AUTO: 0.02 K/UL (ref 0–0.04)
IMM GRANULOCYTES NFR BLD AUTO: 0.3 % (ref 0–0.5)
LDLC SERPL CALC-MCNC: 97.6 MG/DL (ref 63–159)
LYMPHOCYTES # BLD AUTO: 1.1 K/UL (ref 1–4.8)
LYMPHOCYTES NFR BLD: 17.3 % (ref 18–48)
MCH RBC QN AUTO: 31.5 PG (ref 27–31)
MCHC RBC AUTO-ENTMCNC: 33.1 G/DL (ref 32–36)
MCV RBC AUTO: 95 FL (ref 82–98)
MONOCYTES # BLD AUTO: 0.4 K/UL (ref 0.3–1)
MONOCYTES NFR BLD: 6.6 % (ref 4–15)
NEUTROPHILS # BLD AUTO: 4.9 K/UL (ref 1.8–7.7)
NEUTROPHILS NFR BLD: 74.7 % (ref 38–73)
NONHDLC SERPL-MCNC: 130 MG/DL
NRBC BLD-RTO: 0 /100 WBC
PLATELET # BLD AUTO: 178 K/UL (ref 150–350)
PMV BLD AUTO: 11 FL (ref 9.2–12.9)
POTASSIUM SERPL-SCNC: 4 MMOL/L (ref 3.5–5.1)
PROT SERPL-MCNC: 6.9 G/DL (ref 6–8.4)
RBC # BLD AUTO: 4.26 M/UL (ref 4–5.4)
SODIUM SERPL-SCNC: 140 MMOL/L (ref 136–145)
TRIGL SERPL-MCNC: 162 MG/DL (ref 30–150)
TSH SERPL DL<=0.005 MIU/L-ACNC: 1.73 UIU/ML (ref 0.4–4)
WBC # BLD AUTO: 6.52 K/UL (ref 3.9–12.7)

## 2020-11-09 PROCEDURE — 82728 ASSAY OF FERRITIN: CPT

## 2020-11-09 PROCEDURE — 80053 COMPREHEN METABOLIC PANEL: CPT

## 2020-11-09 PROCEDURE — 84443 ASSAY THYROID STIM HORMONE: CPT

## 2020-11-09 PROCEDURE — 83036 HEMOGLOBIN GLYCOSYLATED A1C: CPT

## 2020-11-09 PROCEDURE — 85025 COMPLETE CBC W/AUTO DIFF WBC: CPT

## 2020-11-09 PROCEDURE — 36415 COLL VENOUS BLD VENIPUNCTURE: CPT

## 2020-11-09 PROCEDURE — 80061 LIPID PANEL: CPT

## 2020-12-07 ENCOUNTER — OFFICE VISIT (OUTPATIENT)
Dept: INTERNAL MEDICINE | Facility: CLINIC | Age: 55
End: 2020-12-07
Payer: COMMERCIAL

## 2020-12-07 VITALS
OXYGEN SATURATION: 98 % | RESPIRATION RATE: 17 BRPM | HEIGHT: 62 IN | DIASTOLIC BLOOD PRESSURE: 82 MMHG | TEMPERATURE: 98 F | SYSTOLIC BLOOD PRESSURE: 112 MMHG | WEIGHT: 171.06 LBS | BODY MASS INDEX: 31.48 KG/M2 | HEART RATE: 83 BPM

## 2020-12-07 DIAGNOSIS — G20.A1 PARKINSON DISEASE: ICD-10-CM

## 2020-12-07 DIAGNOSIS — F41.9 ANXIETY: ICD-10-CM

## 2020-12-07 DIAGNOSIS — Z11.4 ENCOUNTER FOR SCREENING FOR HIV: ICD-10-CM

## 2020-12-07 DIAGNOSIS — E83.110 HEREDITARY HEMOCHROMATOSIS: ICD-10-CM

## 2020-12-07 DIAGNOSIS — E78.5 HYPERLIPIDEMIA, UNSPECIFIED HYPERLIPIDEMIA TYPE: Primary | ICD-10-CM

## 2020-12-07 DIAGNOSIS — E66.9 OBESITY (BMI 30-39.9): ICD-10-CM

## 2020-12-07 DIAGNOSIS — Z85.3 HX OF BREAST CANCER: ICD-10-CM

## 2020-12-07 PROCEDURE — 99999 PR PBB SHADOW E&M-EST. PATIENT-LVL IV: ICD-10-PCS | Mod: PBBFAC,,, | Performed by: INTERNAL MEDICINE

## 2020-12-07 PROCEDURE — 3008F PR BODY MASS INDEX (BMI) DOCUMENTED: ICD-10-PCS | Mod: CPTII,S$GLB,, | Performed by: INTERNAL MEDICINE

## 2020-12-07 PROCEDURE — 1126F AMNT PAIN NOTED NONE PRSNT: CPT | Mod: S$GLB,,, | Performed by: INTERNAL MEDICINE

## 2020-12-07 PROCEDURE — 99214 PR OFFICE/OUTPT VISIT, EST, LEVL IV, 30-39 MIN: ICD-10-PCS | Mod: S$GLB,,, | Performed by: INTERNAL MEDICINE

## 2020-12-07 PROCEDURE — 3008F BODY MASS INDEX DOCD: CPT | Mod: CPTII,S$GLB,, | Performed by: INTERNAL MEDICINE

## 2020-12-07 PROCEDURE — 99999 PR PBB SHADOW E&M-EST. PATIENT-LVL IV: CPT | Mod: PBBFAC,,, | Performed by: INTERNAL MEDICINE

## 2020-12-07 PROCEDURE — 99214 OFFICE O/P EST MOD 30 MIN: CPT | Mod: S$GLB,,, | Performed by: INTERNAL MEDICINE

## 2020-12-07 PROCEDURE — 1126F PR PAIN SEVERITY QUANTIFIED, NO PAIN PRESENT: ICD-10-PCS | Mod: S$GLB,,, | Performed by: INTERNAL MEDICINE

## 2020-12-07 RX ORDER — ROPINIROLE 0.25 MG/1
TABLET, FILM COATED ORAL
COMMUNITY
Start: 2020-11-05 | End: 2020-12-07

## 2020-12-07 NOTE — PROGRESS NOTES
Subjective:       Patient ID: Rachel Leal is a 55 y.o. female.    Chief Complaint: Follow-up (6 mo)      HPI:  Patient is known to me and presents for follow up DNA positive hemochromotosis, h/o breast cancer and anxiety.  Labs from 11/9/2020 personally reviewed and discussed with the patient today.      HTN: on hyzaar 50-12.5mg. BP well controlled. Denies chest pains, SOB, LE edema.     GERD: on  nexium. working well. No abd pain n/v/d/c. No dysphagia/odynophagia     H/o breast cancer with metastasis to bone (stage VI at diagnosis): dx 2013. Follows with oncology. On arimidex and xgeva; s/p left mastectomy. Doing CT scans with oncology, no MMG. she did follow with her oncologist recently. Last PET with no mets     Anxiety: Effexor and Xanax currently. Mood is well controlled. Denies depressed mood. Denies SI. Using Xanax every night;filling monthly.      HLD: on atorvastatin and omega 3. LDl trending down. No medication side effects.      Parkinson's: she is taking amantadine and sinemet. She was given neuopro but resulted in cellulitis that responded well to antibx given at urgent care. Stopped the patch and hasn't had any further reactions. Neurology is managing     Hemochromotosis: diagnosed with positive DNA analysis but no sympotms. Ferritin normal. She does follow with hepatology. Last US 7/2019 showed fatty infiltration otherwise normal. Last AST/ALT normal, normal bilirubin. No history of blood sugar problems. Had recent bx with hepatology as well.    Past Medical History:   Diagnosis Date    Cancer     breast stage iv, remission 2014    Fatty liver     Hereditary hemochromatosis 12/16/2015    History of TMJ syndrome     Hyperlipidemia     Hypertension     Hypoglycemia     Osteoarthritis     Parkinson disease     Torn meniscus        Family History   Problem Relation Age of Onset    Colon cancer Mother     Diabetes Father     Heart disease Father     Hemochromatosis Brother      Cirrhosis Brother         hemochromotosis    Breast cancer Neg Hx     Ovarian cancer Neg Hx        Social History     Socioeconomic History    Marital status:      Spouse name: Not on file    Number of children: Not on file    Years of education: Not on file    Highest education level: Not on file   Occupational History    Not on file   Social Needs    Financial resource strain: Not on file    Food insecurity     Worry: Not on file     Inability: Not on file    Transportation needs     Medical: Not on file     Non-medical: Not on file   Tobacco Use    Smoking status: Former Smoker     Packs/day: 0.25     Types: Cigarettes     Quit date: 2013     Years since quittin.2    Smokeless tobacco: Never Used   Substance and Sexual Activity    Alcohol use: Yes     Comment: drinking 6 peers per month, no daily use     Drug use: No    Sexual activity: Yes     Partners: Male     Birth control/protection: Surgical     Comment: .   Lifestyle    Physical activity     Days per week: Not on file     Minutes per session: Not on file    Stress: Not on file   Relationships    Social connections     Talks on phone: Not on file     Gets together: Not on file     Attends Voodoo service: Not on file     Active member of club or organization: Not on file     Attends meetings of clubs or organizations: Not on file     Relationship status: Not on file   Other Topics Concern    Not on file   Social History Narrative    Not on file       Review of Systems   Constitutional: Negative for activity change, fatigue, fever and unexpected weight change.   HENT: Negative for congestion, ear pain, hearing loss, rhinorrhea, sore throat and tinnitus.    Eyes: Negative for pain, redness and visual disturbance.   Respiratory: Negative for cough, shortness of breath and wheezing.    Cardiovascular: Negative for chest pain, palpitations and leg swelling.   Gastrointestinal: Positive for constipation. Negative for  abdominal pain, blood in stool, diarrhea, nausea and vomiting.   Genitourinary: Negative for dysuria, frequency, pelvic pain and urgency.   Musculoskeletal: Negative for back pain, joint swelling and neck pain.   Skin: Negative for color change, rash and wound.   Neurological: Negative for dizziness, tremors, weakness, light-headedness and headaches.         Objective:      Physical Exam  Vitals signs reviewed.   Constitutional:       General: She is not in acute distress.     Appearance: She is well-developed.   HENT:      Head: Normocephalic and atraumatic.      Right Ear: External ear normal.      Left Ear: External ear normal.   Eyes:      General: No scleral icterus.        Right eye: No discharge.         Left eye: No discharge.   Neck:      Musculoskeletal: Neck supple.      Thyroid: No thyromegaly.   Cardiovascular:      Rate and Rhythm: Normal rate and regular rhythm.      Heart sounds: No murmur. No friction rub. No gallop.    Pulmonary:      Effort: Pulmonary effort is normal. No respiratory distress.      Breath sounds: Normal breath sounds. No wheezing or rales.   Abdominal:      General: Bowel sounds are normal. There is no distension.      Palpations: Abdomen is soft.      Tenderness: There is no abdominal tenderness.   Lymphadenopathy:      Cervical: No cervical adenopathy.   Skin:     General: Skin is warm and dry.   Neurological:      Mental Status: She is alert and oriented to person, place, and time.      Cranial Nerves: No cranial nerve deficit.   Psychiatric:         Behavior: Behavior normal.         Assessment:       1. Hyperlipidemia, unspecified hyperlipidemia type    2. Hereditary hemochromatosis    3. Obesity (BMI 30-39.9)    4. Anxiety    5. Parkinson disease    6. Hx of breast cancer    7. Encounter for screening for HIV        Plan:       Rachel was seen today for follow-up.    Diagnoses and all orders for this visit:    Hyperlipidemia, unspecified hyperlipidemia type  -     CBC Auto  Differential; Future  -     Comprehensive Metabolic Panel; Future  -     TSH; Future  -     Lipid Panel; Future  -     HIV 1/2 Ag/Ab (4th Gen); Future  Chronic controlled  Cont statin same dose    Hereditary hemochromatosis  -     CBC Auto Differential; Future  -     Comprehensive Metabolic Panel; Future  Chronic stable  Follow with hepatology  LFTs normal    Obesity (BMI 30-39.9)  -     CBC Auto Differential; Future  -     Comprehensive Metabolic Panel; Future  -     TSH; Future  -     Lipid Panel; Future  Diet, exercise and weight loss    Anxiety  -     TSH; Future  Chronic stable  Cont meds same dose    Parkinson disease  Chronic stable  Follows with neurology who is managing her medications    Hx of breast cancer  Chronic stable  Follows with oncology who is managing her medications    Encounter for screening for HIV  -     HIV 1/2 Ag/Ab (4th Gen); Future         Health Maintenance:  -CRC: age 49 (mom and grandmother with CRC). Had polyps, unsure what kind. Done 8/2016  -PAP: s/p hysterectomy  -MMG: s/p left mastectomy, doing CT for screening  -tobacco: reports occasional use  -Vaccines: flu declines      RTC 6 months and PRN

## 2020-12-16 ENCOUNTER — PATIENT MESSAGE (OUTPATIENT)
Dept: INTERNAL MEDICINE | Facility: CLINIC | Age: 55
End: 2020-12-16

## 2020-12-16 DIAGNOSIS — F41.9 ANXIETY: ICD-10-CM

## 2020-12-16 RX ORDER — ALPRAZOLAM 0.5 MG/1
TABLET ORAL
Qty: 30 TABLET | Refills: 0 | Status: SHIPPED | OUTPATIENT
Start: 2020-12-16 | End: 2021-06-14 | Stop reason: SDUPTHER

## 2021-01-08 ENCOUNTER — PATIENT MESSAGE (OUTPATIENT)
Dept: INTERNAL MEDICINE | Facility: CLINIC | Age: 56
End: 2021-01-08

## 2021-01-08 DIAGNOSIS — E78.5 HYPERLIPIDEMIA, UNSPECIFIED HYPERLIPIDEMIA TYPE: ICD-10-CM

## 2021-01-08 RX ORDER — ATORVASTATIN CALCIUM 40 MG/1
40 TABLET, FILM COATED ORAL DAILY
Qty: 90 TABLET | Refills: 3 | Status: SHIPPED | OUTPATIENT
Start: 2021-01-08 | End: 2021-12-09

## 2021-01-14 RX ORDER — AMANTADINE HYDROCHLORIDE 100 MG/1
100 CAPSULE, GELATIN COATED ORAL 3 TIMES DAILY
Qty: 90 CAPSULE | Refills: 6 | Status: SHIPPED | OUTPATIENT
Start: 2021-01-14 | End: 2021-09-09

## 2021-04-23 ENCOUNTER — TELEPHONE (OUTPATIENT)
Dept: NEUROLOGY | Facility: CLINIC | Age: 56
End: 2021-04-23

## 2021-06-07 ENCOUNTER — LAB VISIT (OUTPATIENT)
Dept: LAB | Facility: HOSPITAL | Age: 56
End: 2021-06-07
Attending: INTERNAL MEDICINE
Payer: COMMERCIAL

## 2021-06-07 DIAGNOSIS — F41.9 ANXIETY: ICD-10-CM

## 2021-06-07 DIAGNOSIS — E78.5 HYPERLIPIDEMIA, UNSPECIFIED HYPERLIPIDEMIA TYPE: ICD-10-CM

## 2021-06-07 DIAGNOSIS — E66.9 OBESITY (BMI 30-39.9): ICD-10-CM

## 2021-06-07 DIAGNOSIS — Z11.4 ENCOUNTER FOR SCREENING FOR HIV: ICD-10-CM

## 2021-06-07 DIAGNOSIS — E83.110 HEREDITARY HEMOCHROMATOSIS: ICD-10-CM

## 2021-06-07 LAB
ALBUMIN SERPL BCP-MCNC: 3.9 G/DL (ref 3.5–5.2)
ALP SERPL-CCNC: 75 U/L (ref 55–135)
ALT SERPL W/O P-5'-P-CCNC: 5 U/L (ref 10–44)
ANION GAP SERPL CALC-SCNC: 11 MMOL/L (ref 8–16)
AST SERPL-CCNC: 14 U/L (ref 10–40)
BASOPHILS # BLD AUTO: 0.02 K/UL (ref 0–0.2)
BASOPHILS # BLD AUTO: 0.02 K/UL (ref 0–0.2)
BASOPHILS NFR BLD: 0.3 % (ref 0–1.9)
BASOPHILS NFR BLD: 0.3 % (ref 0–1.9)
BILIRUB SERPL-MCNC: 0.5 MG/DL (ref 0.1–1)
BUN SERPL-MCNC: 21 MG/DL (ref 6–20)
CALCIUM SERPL-MCNC: 9.7 MG/DL (ref 8.7–10.5)
CHLORIDE SERPL-SCNC: 103 MMOL/L (ref 95–110)
CHOLEST SERPL-MCNC: 200 MG/DL (ref 120–199)
CHOLEST/HDLC SERPL: 3.9 {RATIO} (ref 2–5)
CO2 SERPL-SCNC: 27 MMOL/L (ref 23–29)
CREAT SERPL-MCNC: 0.9 MG/DL (ref 0.5–1.4)
DIFFERENTIAL METHOD: ABNORMAL
DIFFERENTIAL METHOD: ABNORMAL
EOSINOPHIL # BLD AUTO: 0.1 K/UL (ref 0–0.5)
EOSINOPHIL # BLD AUTO: 0.1 K/UL (ref 0–0.5)
EOSINOPHIL NFR BLD: 0.9 % (ref 0–8)
EOSINOPHIL NFR BLD: 0.9 % (ref 0–8)
ERYTHROCYTE [DISTWIDTH] IN BLOOD BY AUTOMATED COUNT: 11.9 % (ref 11.5–14.5)
ERYTHROCYTE [DISTWIDTH] IN BLOOD BY AUTOMATED COUNT: 11.9 % (ref 11.5–14.5)
EST. GFR  (AFRICAN AMERICAN): >60 ML/MIN/1.73 M^2
EST. GFR  (NON AFRICAN AMERICAN): >60 ML/MIN/1.73 M^2
GLUCOSE SERPL-MCNC: 93 MG/DL (ref 70–110)
HCT VFR BLD AUTO: 42.1 % (ref 37–48.5)
HCT VFR BLD AUTO: 42.1 % (ref 37–48.5)
HDLC SERPL-MCNC: 51 MG/DL (ref 40–75)
HDLC SERPL: 25.5 % (ref 20–50)
HGB BLD-MCNC: 14 G/DL (ref 12–16)
HGB BLD-MCNC: 14 G/DL (ref 12–16)
IMM GRANULOCYTES # BLD AUTO: 0.02 K/UL (ref 0–0.04)
IMM GRANULOCYTES # BLD AUTO: 0.02 K/UL (ref 0–0.04)
IMM GRANULOCYTES NFR BLD AUTO: 0.3 % (ref 0–0.5)
IMM GRANULOCYTES NFR BLD AUTO: 0.3 % (ref 0–0.5)
LDLC SERPL CALC-MCNC: 110.2 MG/DL (ref 63–159)
LYMPHOCYTES # BLD AUTO: 1.1 K/UL (ref 1–4.8)
LYMPHOCYTES # BLD AUTO: 1.1 K/UL (ref 1–4.8)
LYMPHOCYTES NFR BLD: 15.9 % (ref 18–48)
LYMPHOCYTES NFR BLD: 15.9 % (ref 18–48)
MCH RBC QN AUTO: 32 PG (ref 27–31)
MCH RBC QN AUTO: 32 PG (ref 27–31)
MCHC RBC AUTO-ENTMCNC: 33.3 G/DL (ref 32–36)
MCHC RBC AUTO-ENTMCNC: 33.3 G/DL (ref 32–36)
MCV RBC AUTO: 96 FL (ref 82–98)
MCV RBC AUTO: 96 FL (ref 82–98)
MONOCYTES # BLD AUTO: 0.6 K/UL (ref 0.3–1)
MONOCYTES # BLD AUTO: 0.6 K/UL (ref 0.3–1)
MONOCYTES NFR BLD: 8.2 % (ref 4–15)
MONOCYTES NFR BLD: 8.2 % (ref 4–15)
NEUTROPHILS # BLD AUTO: 5.2 K/UL (ref 1.8–7.7)
NEUTROPHILS # BLD AUTO: 5.2 K/UL (ref 1.8–7.7)
NEUTROPHILS NFR BLD: 74.4 % (ref 38–73)
NEUTROPHILS NFR BLD: 74.4 % (ref 38–73)
NONHDLC SERPL-MCNC: 149 MG/DL
NRBC BLD-RTO: 0 /100 WBC
NRBC BLD-RTO: 0 /100 WBC
PLATELET # BLD AUTO: 169 K/UL (ref 150–450)
PLATELET # BLD AUTO: 169 K/UL (ref 150–450)
PMV BLD AUTO: 11.1 FL (ref 9.2–12.9)
PMV BLD AUTO: 11.1 FL (ref 9.2–12.9)
POTASSIUM SERPL-SCNC: 4.2 MMOL/L (ref 3.5–5.1)
PROT SERPL-MCNC: 7.3 G/DL (ref 6–8.4)
RBC # BLD AUTO: 4.38 M/UL (ref 4–5.4)
RBC # BLD AUTO: 4.38 M/UL (ref 4–5.4)
SODIUM SERPL-SCNC: 141 MMOL/L (ref 136–145)
TRIGL SERPL-MCNC: 194 MG/DL (ref 30–150)
TSH SERPL DL<=0.005 MIU/L-ACNC: 2.66 UIU/ML (ref 0.4–4)
WBC # BLD AUTO: 6.94 K/UL (ref 3.9–12.7)
WBC # BLD AUTO: 6.94 K/UL (ref 3.9–12.7)

## 2021-06-07 PROCEDURE — 86703 HIV-1/HIV-2 1 RESULT ANTBDY: CPT | Performed by: INTERNAL MEDICINE

## 2021-06-07 PROCEDURE — 36415 COLL VENOUS BLD VENIPUNCTURE: CPT | Performed by: INTERNAL MEDICINE

## 2021-06-07 PROCEDURE — 84443 ASSAY THYROID STIM HORMONE: CPT | Performed by: INTERNAL MEDICINE

## 2021-06-07 PROCEDURE — 80061 LIPID PANEL: CPT | Performed by: INTERNAL MEDICINE

## 2021-06-07 PROCEDURE — 80053 COMPREHEN METABOLIC PANEL: CPT | Performed by: INTERNAL MEDICINE

## 2021-06-07 PROCEDURE — 83540 ASSAY OF IRON: CPT | Performed by: INTERNAL MEDICINE

## 2021-06-07 PROCEDURE — 85025 COMPLETE CBC W/AUTO DIFF WBC: CPT | Performed by: INTERNAL MEDICINE

## 2021-06-07 PROCEDURE — 85025 COMPLETE CBC W/AUTO DIFF WBC: CPT | Mod: 91 | Performed by: INTERNAL MEDICINE

## 2021-06-07 PROCEDURE — 82728 ASSAY OF FERRITIN: CPT | Performed by: INTERNAL MEDICINE

## 2021-06-08 LAB
FERRITIN SERPL-MCNC: 63 NG/ML (ref 20–300)
HIV 1+2 AB+HIV1 P24 AG SERPL QL IA: NEGATIVE
IRON SERPL-MCNC: 153 UG/DL (ref 30–160)
SATURATED IRON: 55 % (ref 20–50)
TOTAL IRON BINDING CAPACITY: 277 UG/DL (ref 250–450)
TRANSFERRIN SERPL-MCNC: 187 MG/DL (ref 200–375)

## 2021-06-14 ENCOUNTER — OFFICE VISIT (OUTPATIENT)
Dept: INTERNAL MEDICINE | Facility: CLINIC | Age: 56
End: 2021-06-14
Payer: COMMERCIAL

## 2021-06-14 VITALS
DIASTOLIC BLOOD PRESSURE: 85 MMHG | RESPIRATION RATE: 18 BRPM | HEART RATE: 76 BPM | SYSTOLIC BLOOD PRESSURE: 135 MMHG | BODY MASS INDEX: 33.34 KG/M2 | HEIGHT: 62 IN | WEIGHT: 181.19 LBS | OXYGEN SATURATION: 98 %

## 2021-06-14 DIAGNOSIS — E66.9 OBESITY (BMI 30-39.9): ICD-10-CM

## 2021-06-14 DIAGNOSIS — K76.0 FATTY LIVER: ICD-10-CM

## 2021-06-14 DIAGNOSIS — I10 BENIGN ESSENTIAL HTN: ICD-10-CM

## 2021-06-14 DIAGNOSIS — E83.110 HEREDITARY HEMOCHROMATOSIS: ICD-10-CM

## 2021-06-14 DIAGNOSIS — G20.A1 PARKINSON DISEASE: ICD-10-CM

## 2021-06-14 DIAGNOSIS — Z85.3 HX OF BREAST CANCER: ICD-10-CM

## 2021-06-14 DIAGNOSIS — E78.5 HYPERLIPIDEMIA, UNSPECIFIED HYPERLIPIDEMIA TYPE: Primary | ICD-10-CM

## 2021-06-14 DIAGNOSIS — F41.9 ANXIETY: ICD-10-CM

## 2021-06-14 PROCEDURE — 99214 PR OFFICE/OUTPT VISIT, EST, LEVL IV, 30-39 MIN: ICD-10-PCS | Mod: S$GLB,,, | Performed by: INTERNAL MEDICINE

## 2021-06-14 PROCEDURE — 99214 OFFICE O/P EST MOD 30 MIN: CPT | Mod: S$GLB,,, | Performed by: INTERNAL MEDICINE

## 2021-06-14 PROCEDURE — 1125F PR PAIN SEVERITY QUANTIFIED, PAIN PRESENT: ICD-10-PCS | Mod: S$GLB,,, | Performed by: INTERNAL MEDICINE

## 2021-06-14 PROCEDURE — 1125F AMNT PAIN NOTED PAIN PRSNT: CPT | Mod: S$GLB,,, | Performed by: INTERNAL MEDICINE

## 2021-06-14 PROCEDURE — 3008F BODY MASS INDEX DOCD: CPT | Mod: CPTII,S$GLB,, | Performed by: INTERNAL MEDICINE

## 2021-06-14 PROCEDURE — 99999 PR PBB SHADOW E&M-EST. PATIENT-LVL IV: CPT | Mod: PBBFAC,,, | Performed by: INTERNAL MEDICINE

## 2021-06-14 PROCEDURE — 3008F PR BODY MASS INDEX (BMI) DOCUMENTED: ICD-10-PCS | Mod: CPTII,S$GLB,, | Performed by: INTERNAL MEDICINE

## 2021-06-14 PROCEDURE — 99999 PR PBB SHADOW E&M-EST. PATIENT-LVL IV: ICD-10-PCS | Mod: PBBFAC,,, | Performed by: INTERNAL MEDICINE

## 2021-06-14 RX ORDER — ALPRAZOLAM 0.5 MG/1
TABLET ORAL
Qty: 30 TABLET | Refills: 0 | Status: SHIPPED | OUTPATIENT
Start: 2021-06-14 | End: 2022-11-01

## 2021-06-14 RX ORDER — KETOCONAZOLE 20 MG/ML
SHAMPOO, SUSPENSION TOPICAL
COMMUNITY
Start: 2021-01-08 | End: 2021-06-14

## 2021-07-16 ENCOUNTER — PATIENT MESSAGE (OUTPATIENT)
Dept: NEUROLOGY | Facility: CLINIC | Age: 56
End: 2021-07-16

## 2021-07-16 ENCOUNTER — OFFICE VISIT (OUTPATIENT)
Dept: NEUROLOGY | Facility: CLINIC | Age: 56
End: 2021-07-16
Payer: COMMERCIAL

## 2021-07-16 VITALS
BODY MASS INDEX: 32.94 KG/M2 | SYSTOLIC BLOOD PRESSURE: 115 MMHG | WEIGHT: 180.13 LBS | DIASTOLIC BLOOD PRESSURE: 80 MMHG | HEART RATE: 89 BPM

## 2021-07-16 DIAGNOSIS — G20.A1 PARKINSON DISEASE: ICD-10-CM

## 2021-07-16 PROCEDURE — 3008F BODY MASS INDEX DOCD: CPT | Mod: CPTII,S$GLB,, | Performed by: PSYCHIATRY & NEUROLOGY

## 2021-07-16 PROCEDURE — 99999 PR PBB SHADOW E&M-EST. PATIENT-LVL III: ICD-10-PCS | Mod: PBBFAC,,, | Performed by: PSYCHIATRY & NEUROLOGY

## 2021-07-16 PROCEDURE — 3008F PR BODY MASS INDEX (BMI) DOCUMENTED: ICD-10-PCS | Mod: CPTII,S$GLB,, | Performed by: PSYCHIATRY & NEUROLOGY

## 2021-07-16 PROCEDURE — 99999 PR PBB SHADOW E&M-EST. PATIENT-LVL III: CPT | Mod: PBBFAC,,, | Performed by: PSYCHIATRY & NEUROLOGY

## 2021-07-16 PROCEDURE — 1126F AMNT PAIN NOTED NONE PRSNT: CPT | Mod: S$GLB,,, | Performed by: PSYCHIATRY & NEUROLOGY

## 2021-07-16 PROCEDURE — 99214 OFFICE O/P EST MOD 30 MIN: CPT | Mod: S$GLB,,, | Performed by: PSYCHIATRY & NEUROLOGY

## 2021-07-16 PROCEDURE — 1126F PR PAIN SEVERITY QUANTIFIED, NO PAIN PRESENT: ICD-10-PCS | Mod: S$GLB,,, | Performed by: PSYCHIATRY & NEUROLOGY

## 2021-07-16 PROCEDURE — 99214 PR OFFICE/OUTPT VISIT, EST, LEVL IV, 30-39 MIN: ICD-10-PCS | Mod: S$GLB,,, | Performed by: PSYCHIATRY & NEUROLOGY

## 2021-08-18 DIAGNOSIS — G20.A1 PARKINSON DISEASE: Primary | ICD-10-CM

## 2021-08-19 RX ORDER — CARBIDOPA AND LEVODOPA 25; 100 MG/1; MG/1
1.5 TABLET ORAL 4 TIMES DAILY
Qty: 540 TABLET | Refills: 3 | Status: SHIPPED | OUTPATIENT
Start: 2021-08-19 | End: 2023-01-18

## 2021-09-03 ENCOUNTER — PATIENT MESSAGE (OUTPATIENT)
Dept: NEUROLOGY | Facility: CLINIC | Age: 56
End: 2021-09-03

## 2021-12-08 DIAGNOSIS — E78.5 HYPERLIPIDEMIA, UNSPECIFIED HYPERLIPIDEMIA TYPE: ICD-10-CM

## 2021-12-13 RX ORDER — ATORVASTATIN CALCIUM 40 MG/1
TABLET, FILM COATED ORAL
Qty: 90 TABLET | Refills: 1 | Status: SHIPPED | OUTPATIENT
Start: 2021-12-13 | End: 2022-08-10

## 2021-12-15 RX ORDER — LOSARTAN POTASSIUM AND HYDROCHLOROTHIAZIDE 12.5; 5 MG/1; MG/1
TABLET ORAL
Qty: 90 TABLET | Refills: 1 | Status: SHIPPED | OUTPATIENT
Start: 2021-12-15 | End: 2022-08-27

## 2022-01-11 ENCOUNTER — OFFICE VISIT (OUTPATIENT)
Dept: NEUROLOGY | Facility: CLINIC | Age: 57
End: 2022-01-11
Payer: COMMERCIAL

## 2022-01-11 ENCOUNTER — PATIENT MESSAGE (OUTPATIENT)
Dept: NEUROLOGY | Facility: CLINIC | Age: 57
End: 2022-01-11

## 2022-01-11 VITALS
DIASTOLIC BLOOD PRESSURE: 88 MMHG | HEART RATE: 85 BPM | SYSTOLIC BLOOD PRESSURE: 137 MMHG | BODY MASS INDEX: 33.99 KG/M2 | WEIGHT: 185.88 LBS

## 2022-01-11 DIAGNOSIS — G20.A1 PARKINSON DISEASE: ICD-10-CM

## 2022-01-11 DIAGNOSIS — R20.0 RIGHT LEG NUMBNESS: ICD-10-CM

## 2022-01-11 PROCEDURE — 3079F DIAST BP 80-89 MM HG: CPT | Mod: CPTII,S$GLB,, | Performed by: PSYCHIATRY & NEUROLOGY

## 2022-01-11 PROCEDURE — 99214 PR OFFICE/OUTPT VISIT, EST, LEVL IV, 30-39 MIN: ICD-10-PCS | Mod: S$GLB,,, | Performed by: PSYCHIATRY & NEUROLOGY

## 2022-01-11 PROCEDURE — 99999 PR PBB SHADOW E&M-EST. PATIENT-LVL III: ICD-10-PCS | Mod: PBBFAC,,, | Performed by: PSYCHIATRY & NEUROLOGY

## 2022-01-11 PROCEDURE — 1159F PR MEDICATION LIST DOCUMENTED IN MEDICAL RECORD: ICD-10-PCS | Mod: CPTII,S$GLB,, | Performed by: PSYCHIATRY & NEUROLOGY

## 2022-01-11 PROCEDURE — 99214 OFFICE O/P EST MOD 30 MIN: CPT | Mod: S$GLB,,, | Performed by: PSYCHIATRY & NEUROLOGY

## 2022-01-11 PROCEDURE — 3079F PR MOST RECENT DIASTOLIC BLOOD PRESSURE 80-89 MM HG: ICD-10-PCS | Mod: CPTII,S$GLB,, | Performed by: PSYCHIATRY & NEUROLOGY

## 2022-01-11 PROCEDURE — 3008F PR BODY MASS INDEX (BMI) DOCUMENTED: ICD-10-PCS | Mod: CPTII,S$GLB,, | Performed by: PSYCHIATRY & NEUROLOGY

## 2022-01-11 PROCEDURE — 3075F SYST BP GE 130 - 139MM HG: CPT | Mod: CPTII,S$GLB,, | Performed by: PSYCHIATRY & NEUROLOGY

## 2022-01-11 PROCEDURE — 3008F BODY MASS INDEX DOCD: CPT | Mod: CPTII,S$GLB,, | Performed by: PSYCHIATRY & NEUROLOGY

## 2022-01-11 PROCEDURE — 1159F MED LIST DOCD IN RCRD: CPT | Mod: CPTII,S$GLB,, | Performed by: PSYCHIATRY & NEUROLOGY

## 2022-01-11 PROCEDURE — 3075F PR MOST RECENT SYSTOLIC BLOOD PRESS GE 130-139MM HG: ICD-10-PCS | Mod: CPTII,S$GLB,, | Performed by: PSYCHIATRY & NEUROLOGY

## 2022-01-11 PROCEDURE — 99999 PR PBB SHADOW E&M-EST. PATIENT-LVL III: CPT | Mod: PBBFAC,,, | Performed by: PSYCHIATRY & NEUROLOGY

## 2022-01-11 RX ORDER — OXYBUTYNIN CHLORIDE 5 MG/1
5 TABLET, EXTENDED RELEASE ORAL DAILY
COMMUNITY

## 2022-01-11 RX ORDER — CARBIDOPA AND LEVODOPA 25; 100 MG/1; MG/1
1.5 TABLET ORAL
Qty: 270 TABLET | Refills: 11 | Status: SHIPPED | OUTPATIENT
Start: 2022-01-11 | End: 2023-01-11

## 2022-01-11 NOTE — ASSESSMENT & PLAN NOTE
Parkinsonism, familial autosomal dominant. Mother has prominent BG calcifications - possible Fahr's dz. Rachel is under-dosed at them moment.   Due to OFFtime suggested  carbidopa/levodopa 25/100mg 1.5 tab PO TID to 5x daily    Continue Amantadine 100mg PO TID    Discussed increasing amantadine or trying rytary but will perhaps try these later if needed

## 2022-01-11 NOTE — PROGRESS NOTES
"  [facility]    PCP/Referring Provider: No referring provider defined for this encounter.  Date of Service: 1/11/2022    Chief Complaint: PD    Interval Hx    Since last visit,     Started carbidopa/levodopa 25/100mg 1.5 tabs Q5 hours  ONtime is 3 hours  Offtime- tremors and slowness    Continues amantadine 100mg PO TID    Time is 4 hours after which she drags a foot and mild tremors  She is more concerned with tremors    No dizziness, nausea, halluciantions  Some insomnia    Walking better  Hand dexterity is better    No dyskinesias    Falls: none  Assist Device: none    Mother on hospice for similar dz    Med HX  Started ropinirole 0.25mg PO TID and felt better but at 0.5mg PO TID felt worse, was feeling weak and stopped    "PriorHPI: Rachel Leal is a R HANDED 56 y.o. female with a medical issues significant for hemochromatosis, HTN, HL, Breast Cancer s/p chemotherapy, who presents with PDism. Notes her PDism began at age 53. R sided rigidity and arm stiffness began and progressed. "Just like mom." At this point has a moderate R hand resting tremor and     Started carbidopa/levodopa 25/100mg 1 tab daily 2017  Currently on Amantadine 100mg Po TID. This helps her tremor. 8/2/PM  No cognitive issues or leg swelling.  Has 4 hour ONtimes with Amantadine  When she was on both carbidopa/levodopa and amantadine she "felt worse"    R foot inversions when OFF  No orthostasis  No gambling tesnencies    [place]ip[place] descent  Mother and grandmother had PDism"    Review of Systems:   Review of Systems   Constitutional: Negative for fever.   HENT: Negative for congestion.    Eyes: Negative for double vision.   Respiratory: Negative for cough and shortness of breath.    Cardiovascular: Negative for chest pain and leg swelling.   Gastrointestinal: Negative for nausea.   Genitourinary: Negative for dysuria.   Musculoskeletal: Negative for falls.   Skin: Negative for rash.   Neurological: Positive for tremors and " speech change. Negative for headaches.   Psychiatric/Behavioral: Negative for depression.         Current Medications:  Outpatient Encounter Medications as of 2022   Medication Sig Dispense Refill    ALPRAZolam (XANAX) 0.5 MG tablet TAKE 1 TABLET BY MOUTH EVERY DAY AT NIGHT AS NEEDED 30 tablet 0    amantadine HCL (SYMMETREL) 100 mg capsule TAKE 1 CAPSULE BY MOUTH THREE TIMES A  capsule 2    anastrozole (ARIMIDEX) 1 mg Tab Take 1 mg by mouth once daily.      atorvastatin (LIPITOR) 40 MG tablet TAKE 1 TABLET BY MOUTH EVERY DAY 90 tablet 1    carbidopa-levodopa  mg (SINEMET)  mg per tablet Take 1.5 tablets by mouth 4 (four) times daily. 540 tablet 03    COCONUT OIL ORAL Take 2 capsules by mouth once daily.      denosumab (XGEVA) 120 mg/1.7 mL (70 mg/mL) Soln Inject 120 mg into the skin. Every 3 months      docusate sodium (STOOL SOFTENER ORAL) Take by mouth.      losartan-hydrochlorothiazide 50-12.5 mg (HYZAAR) 50-12.5 mg per tablet TAKE 1 TABLET BY MOUTH EVERY DAY 90 tablet 1    oxybutynin (DITROPAN-XL) 5 MG TR24 Take 5 mg by mouth once daily.      venlafaxine (EFFEXOR-XR) 75 MG 24 hr capsule Take 75 mg by mouth once daily.       vitamin D 1000 units Tab Take 1,000 mg by mouth once daily.       esomeprazole (NEXIUM) 20 MG capsule Take 1 capsule (20 mg total) by mouth as needed. 30 capsule 0     No facility-administered encounter medications on file as of 2022.       Past Medical History:  Patient Active Problem List   Diagnosis    Hyperlipidemia    Hx of breast cancer    Hereditary hemochromatosis    Anxiety    Parkinson disease    Fatty liver    Obesity (BMI 30-39.9)    Condyloma    Benign essential HTN    Right leg numbness       Past Surgical History:  Past Surgical History:   Procedure Laterality Date    BREAST SURGERY      augmentation     SECTION      COLONOSCOPY      EXCISION OF CONDYLOMA N/A 2019    Procedure: EXCISION OF ANAL CANAL  CONDYLOMA;  Surgeon: Yunior Starks MD;  Location: [facility];  Service: Colon and Rectal;  Laterality: N/A;    EYE SURGERY      FULGURATION OF ANAL CONDYLOMA N/A 2019    Procedure: FULGURATION OF ANAL CANAL CONDYLOMA;  Surgeon: Yunior Starks MD;  Location: [facility];  Service: Colon and Rectal;  Laterality: N/A;    HAND SURGERY      HYSTERECTOMY      [facility]    LIVER BIOPSY  2017    Mild steatosis, macrovesicular 20% and microvesicular 10%. Mild to moderate siderosis, 2+, within hepatocytes. No fibrosis    MASTECTOMY Left     TONSILLECTOMY         Current Living Situation: home    Social:  Social History     Socioeconomic History    Marital status:    Tobacco Use    Smoking status: Former Smoker     Packs/day: 0.25     Types: Cigarettes     Quit date: 2013     Years since quittin.3    Smokeless tobacco: Never Used   Substance and Sexual Activity    Alcohol use: Yes     Comment: drinking 6 peers per month, no daily use     Drug use: No    Sexual activity: Yes     Partners: Male     Birth control/protection: Surgical     Comment: .       Family History:  Family History   Problem Relation Age of Onset    Colon cancer Mother     Diabetes Father     Heart disease Father     Hemochromatosis Brother     Cirrhosis Brother         hemochromotosis    Breast cancer Neg Hx     Ovarian cancer Neg Hx        PHYSICAL:  /88   Pulse 85   Wt 84.3 kg (185 lb 13.6 oz)   LMP 2012   BMI 33.99 kg/m²     Physical Exam  Constitutional: Well-developed, well-nourished, appears stated age  Eyes: No scleral icterus  ENT: Moist oral mucosa  Cardiovascular: No lower extremity edema   Respiratory: No labored breathing   Skin: No rash   Hematologic: No bruising    Other: GI/ deferred   · Mental status: Alert and oriented to person, place, time, and situation;   · follows commands  · Speech: normal (not dysarthric), no aphasia  · Cranial nerves:            · CN II: Pupils mid-position  and equal, not tested light or accommodation  · CN III, IV, VI: Extraocular movements full, no nystagmus visualized  · CN V: Not tested   · CN VII: Face strong and symmetric bilaterally   · CN VIII: Hearing intact to voice and conversation   · CN IX, X: Palate raises midline and symmetric   · CN XI: Strong shoulder shrug B/L  · CN XII: Tongue appears midline   · Motor: Normal bulk by appearance, no drift   · Sensory: Not tested    · Gait: fairly normal (out of proportion) to arms)  · Deep tendon reflexes: Not tested  · Movement/Coordination                    Mod hypophonic speech.                     Mod facial masking..                   No tremor with rest, posture, kinesis, or intention.   FNF NL  R hand and R foot dystonia mild                  L hand tremor mild    ? Finger taps Finger flicks RAE Heel taps   Left 2+ 2+ - 2+   Right 3+ 3+ - 2+     Laboratory Data:  NA    Imaging:  Reported as NL    Assessment//Plan:   Problem List Items Addressed This Visit        Neuro    Parkinson disease    Current Assessment & Plan     Parkinsonism, familial autosomal dominant. Mother has prominent BG calcifications - possible Fahr's dz. Rachel is under-dosed at them moment.   Due to OFFtime suggested  carbidopa/levodopa 25/100mg 1.5 tab PO TID to 5x daily    Continue Amantadine 100mg PO TID    Discussed increasing amantadine or trying rytary but will perhaps try these later if needed              Other    Right leg numbness    Current Assessment & Plan     Intermittent R leg numbness no weakness           [employer]ae testing    Anu Gresham MD, MS  [facility]  Department of Neurology  Movement Disorders

## 2022-01-21 ENCOUNTER — PATIENT MESSAGE (OUTPATIENT)
Dept: NEUROLOGY | Facility: CLINIC | Age: 57
End: 2022-01-21
Payer: COMMERCIAL

## 2022-02-14 ENCOUNTER — TELEPHONE (OUTPATIENT)
Dept: INTERNAL MEDICINE | Facility: CLINIC | Age: 57
End: 2022-02-14
Payer: COMMERCIAL

## 2022-02-14 DIAGNOSIS — E83.110 HEREDITARY HEMOCHROMATOSIS: Primary | ICD-10-CM

## 2022-02-14 NOTE — TELEPHONE ENCOUNTER
----- Message from Marie Johansen MA sent at 2022  8:17 AM CST -----  Rachel Leal  MRN: [id number]  : 1965  PCP: Cata Leal  Home Phone      [phone]  Work Phone      Not on file.  Mobile          [phone]  Home Phone      [phone]      MESSAGE:     Patient is going to the hospital on 2022 for labs.  Can you add a Ferritin Level to her orders. She says that is usually done when her she goes for bloodwork.

## 2022-02-21 ENCOUNTER — PATIENT MESSAGE (OUTPATIENT)
Dept: NEUROLOGY | Facility: CLINIC | Age: 57
End: 2022-02-21
Payer: COMMERCIAL

## 2022-03-08 ENCOUNTER — LAB VISIT (OUTPATIENT)
Dept: LAB | Facility: HOSPITAL | Age: 57
End: 2022-03-08
Attending: INTERNAL MEDICINE
Payer: COMMERCIAL

## 2022-03-08 DIAGNOSIS — E83.110 HEREDITARY HEMOCHROMATOSIS: ICD-10-CM

## 2022-03-08 DIAGNOSIS — F41.9 ANXIETY: ICD-10-CM

## 2022-03-08 DIAGNOSIS — I10 BENIGN ESSENTIAL HTN: ICD-10-CM

## 2022-03-08 DIAGNOSIS — K76.0 FATTY LIVER: ICD-10-CM

## 2022-03-08 DIAGNOSIS — E66.9 OBESITY (BMI 30-39.9): ICD-10-CM

## 2022-03-08 DIAGNOSIS — E78.5 HYPERLIPIDEMIA, UNSPECIFIED HYPERLIPIDEMIA TYPE: ICD-10-CM

## 2022-03-08 DIAGNOSIS — Z85.3 HX OF BREAST CANCER: ICD-10-CM

## 2022-03-08 DIAGNOSIS — G20.A1 PARKINSON DISEASE: ICD-10-CM

## 2022-03-08 LAB
ALBUMIN SERPL BCP-MCNC: 3.9 G/DL (ref 3.5–5.2)
ALP SERPL-CCNC: 73 U/L (ref 55–135)
ALT SERPL W/O P-5'-P-CCNC: <5 U/L (ref 10–44)
ANION GAP SERPL CALC-SCNC: 11 MMOL/L (ref 8–16)
AST SERPL-CCNC: 11 U/L (ref 10–40)
BASOPHILS # BLD AUTO: 0.02 K/UL (ref 0–0.2)
BASOPHILS NFR BLD: 0.4 % (ref 0–1.9)
BILIRUB SERPL-MCNC: 0.5 MG/DL (ref 0.1–1)
BUN SERPL-MCNC: 13 MG/DL (ref 6–20)
CALCIUM SERPL-MCNC: 9.5 MG/DL (ref 8.7–10.5)
CHLORIDE SERPL-SCNC: 104 MMOL/L (ref 95–110)
CHOLEST SERPL-MCNC: 167 MG/DL (ref 120–199)
CHOLEST/HDLC SERPL: 3 {RATIO} (ref 2–5)
CO2 SERPL-SCNC: 28 MMOL/L (ref 23–29)
CREAT SERPL-MCNC: 0.8 MG/DL (ref 0.5–1.4)
DIFFERENTIAL METHOD: ABNORMAL
EOSINOPHIL # BLD AUTO: 0.1 K/UL (ref 0–0.5)
EOSINOPHIL NFR BLD: 2.7 % (ref 0–8)
ERYTHROCYTE [DISTWIDTH] IN BLOOD BY AUTOMATED COUNT: 11.8 % (ref 11.5–14.5)
EST. GFR  (AFRICAN AMERICAN): >60 ML/MIN/1.73 M^2
EST. GFR  (NON AFRICAN AMERICAN): >60 ML/MIN/1.73 M^2
FERRITIN SERPL-MCNC: 106 NG/ML (ref 20–300)
GLUCOSE SERPL-MCNC: 101 MG/DL (ref 70–110)
HCT VFR BLD AUTO: 43.1 % (ref 37–48.5)
HDLC SERPL-MCNC: 55 MG/DL (ref 40–75)
HDLC SERPL: 32.9 % (ref 20–50)
HGB BLD-MCNC: 14.2 G/DL (ref 12–16)
IMM GRANULOCYTES # BLD AUTO: 0.02 K/UL (ref 0–0.04)
IMM GRANULOCYTES NFR BLD AUTO: 0.4 % (ref 0–0.5)
IRON SERPL-MCNC: 165 UG/DL (ref 30–160)
LDLC SERPL CALC-MCNC: 86.8 MG/DL (ref 63–159)
LYMPHOCYTES # BLD AUTO: 1.1 K/UL (ref 1–4.8)
LYMPHOCYTES NFR BLD: 23.6 % (ref 18–48)
MCH RBC QN AUTO: 32.1 PG (ref 27–31)
MCHC RBC AUTO-ENTMCNC: 32.9 G/DL (ref 32–36)
MCV RBC AUTO: 98 FL (ref 82–98)
MONOCYTES # BLD AUTO: 0.6 K/UL (ref 0.3–1)
MONOCYTES NFR BLD: 12.2 % (ref 4–15)
NEUTROPHILS # BLD AUTO: 2.7 K/UL (ref 1.8–7.7)
NEUTROPHILS NFR BLD: 60.7 % (ref 38–73)
NONHDLC SERPL-MCNC: 112 MG/DL
NRBC BLD-RTO: 0 /100 WBC
PLATELET # BLD AUTO: 160 K/UL (ref 150–450)
PMV BLD AUTO: 10.8 FL (ref 9.2–12.9)
POTASSIUM SERPL-SCNC: 4.1 MMOL/L (ref 3.5–5.1)
PROT SERPL-MCNC: 7.4 G/DL (ref 6–8.4)
RBC # BLD AUTO: 4.42 M/UL (ref 4–5.4)
SATURATED IRON: 66 % (ref 20–50)
SODIUM SERPL-SCNC: 143 MMOL/L (ref 136–145)
TOTAL IRON BINDING CAPACITY: 249 UG/DL (ref 250–450)
TRANSFERRIN SERPL-MCNC: 168 MG/DL (ref 200–375)
TRIGL SERPL-MCNC: 126 MG/DL (ref 30–150)
TSH SERPL DL<=0.005 MIU/L-ACNC: 3.27 UIU/ML (ref 0.4–4)
WBC # BLD AUTO: 4.5 K/UL (ref 3.9–12.7)

## 2022-03-08 PROCEDURE — 85025 COMPLETE CBC W/AUTO DIFF WBC: CPT | Performed by: INTERNAL MEDICINE

## 2022-03-08 PROCEDURE — 80053 COMPREHEN METABOLIC PANEL: CPT | Performed by: INTERNAL MEDICINE

## 2022-03-08 PROCEDURE — 36415 COLL VENOUS BLD VENIPUNCTURE: CPT | Performed by: INTERNAL MEDICINE

## 2022-03-08 PROCEDURE — 84466 ASSAY OF TRANSFERRIN: CPT | Performed by: INTERNAL MEDICINE

## 2022-03-08 PROCEDURE — 80061 LIPID PANEL: CPT | Performed by: INTERNAL MEDICINE

## 2022-03-08 PROCEDURE — 84443 ASSAY THYROID STIM HORMONE: CPT | Performed by: INTERNAL MEDICINE

## 2022-03-08 PROCEDURE — 82728 ASSAY OF FERRITIN: CPT | Performed by: INTERNAL MEDICINE

## 2022-04-27 ENCOUNTER — OFFICE VISIT (OUTPATIENT)
Dept: INTERNAL MEDICINE | Facility: CLINIC | Age: 57
End: 2022-04-27
Payer: COMMERCIAL

## 2022-04-27 VITALS
HEIGHT: 62 IN | SYSTOLIC BLOOD PRESSURE: 110 MMHG | WEIGHT: 181.19 LBS | RESPIRATION RATE: 16 BRPM | DIASTOLIC BLOOD PRESSURE: 74 MMHG | OXYGEN SATURATION: 98 % | HEART RATE: 91 BPM | BODY MASS INDEX: 33.34 KG/M2

## 2022-04-27 DIAGNOSIS — G20.A1 PARKINSON DISEASE: ICD-10-CM

## 2022-04-27 DIAGNOSIS — E83.110 HEREDITARY HEMOCHROMATOSIS: ICD-10-CM

## 2022-04-27 DIAGNOSIS — E66.9 OBESITY (BMI 30-39.9): ICD-10-CM

## 2022-04-27 DIAGNOSIS — F41.9 ANXIETY: ICD-10-CM

## 2022-04-27 DIAGNOSIS — E78.5 HYPERLIPIDEMIA, UNSPECIFIED HYPERLIPIDEMIA TYPE: ICD-10-CM

## 2022-04-27 DIAGNOSIS — Z90.10 STATUS POST MASTECTOMY, UNSPECIFIED LATERALITY: ICD-10-CM

## 2022-04-27 DIAGNOSIS — R20.0 RIGHT LEG NUMBNESS: ICD-10-CM

## 2022-04-27 DIAGNOSIS — Z85.3 HX OF BREAST CANCER: ICD-10-CM

## 2022-04-27 DIAGNOSIS — I10 BENIGN ESSENTIAL HTN: Primary | ICD-10-CM

## 2022-04-27 PROCEDURE — 1159F PR MEDICATION LIST DOCUMENTED IN MEDICAL RECORD: ICD-10-PCS | Mod: CPTII,S$GLB,, | Performed by: INTERNAL MEDICINE

## 2022-04-27 PROCEDURE — 3008F PR BODY MASS INDEX (BMI) DOCUMENTED: ICD-10-PCS | Mod: CPTII,S$GLB,, | Performed by: INTERNAL MEDICINE

## 2022-04-27 PROCEDURE — 1160F PR REVIEW ALL MEDS BY PRESCRIBER/CLIN PHARMACIST DOCUMENTED: ICD-10-PCS | Mod: CPTII,S$GLB,, | Performed by: INTERNAL MEDICINE

## 2022-04-27 PROCEDURE — 1160F RVW MEDS BY RX/DR IN RCRD: CPT | Mod: CPTII,S$GLB,, | Performed by: INTERNAL MEDICINE

## 2022-04-27 PROCEDURE — 99214 PR OFFICE/OUTPT VISIT, EST, LEVL IV, 30-39 MIN: ICD-10-PCS | Mod: S$GLB,,, | Performed by: INTERNAL MEDICINE

## 2022-04-27 PROCEDURE — 99214 OFFICE O/P EST MOD 30 MIN: CPT | Mod: S$GLB,,, | Performed by: INTERNAL MEDICINE

## 2022-04-27 PROCEDURE — 99999 PR PBB SHADOW E&M-EST. PATIENT-LVL V: CPT | Mod: PBBFAC,,, | Performed by: INTERNAL MEDICINE

## 2022-04-27 PROCEDURE — 3008F BODY MASS INDEX DOCD: CPT | Mod: CPTII,S$GLB,, | Performed by: INTERNAL MEDICINE

## 2022-04-27 PROCEDURE — 3078F DIAST BP <80 MM HG: CPT | Mod: CPTII,S$GLB,, | Performed by: INTERNAL MEDICINE

## 2022-04-27 PROCEDURE — 1159F MED LIST DOCD IN RCRD: CPT | Mod: CPTII,S$GLB,, | Performed by: INTERNAL MEDICINE

## 2022-04-27 PROCEDURE — 3074F PR MOST RECENT SYSTOLIC BLOOD PRESSURE < 130 MM HG: ICD-10-PCS | Mod: CPTII,S$GLB,, | Performed by: INTERNAL MEDICINE

## 2022-04-27 PROCEDURE — 3078F PR MOST RECENT DIASTOLIC BLOOD PRESSURE < 80 MM HG: ICD-10-PCS | Mod: CPTII,S$GLB,, | Performed by: INTERNAL MEDICINE

## 2022-04-27 PROCEDURE — 3074F SYST BP LT 130 MM HG: CPT | Mod: CPTII,S$GLB,, | Performed by: INTERNAL MEDICINE

## 2022-04-27 PROCEDURE — 99999 PR PBB SHADOW E&M-EST. PATIENT-LVL V: ICD-10-PCS | Mod: PBBFAC,,, | Performed by: INTERNAL MEDICINE

## 2022-04-27 RX ORDER — OXYBUTYNIN CHLORIDE 5 MG/1
5 TABLET ORAL DAILY
COMMUNITY
Start: 2022-03-18 | End: 2022-04-27 | Stop reason: SDUPTHER

## 2022-04-27 NOTE — PROGRESS NOTES
Subjective:       Patient ID: Rachel Leal is a 57 y.o. female.    Chief Complaint: Follow-up      HPI:  Patient is known to me and presents for follow up HTN, Parkinson's, DNA positive hemochromotosis, h/o breast cancer and anxiety.  Labs from 3/8/22 personally reviewed and discussed with the patient today.      HTN: on hyzaar 50-12.5mg. BP well controlled. Denies chest pains, SOB, LE edema.      GERD: on  nexium. working well. No abd pain n/v/d/c. No dysphagia/odynophagia     H/o breast cancer with metastasis to bone (stage VI at diagnosis): dx 2013. Follows with oncology. On arimidex and xgeva; s/p left mastectomy. Doing CT scans with oncology, no MMG.      Anxiety: Effexor and Xanax currently. Mood is well controlled. Denies depressed mood. Denies SI. Using Xanax some nights; not filling often     HLD: on atorvastatin and omega 3. LDL trending down. No medication side effects.      Parkinson's: she is taking amantadine and sinemet. She was given neuopro but resulted in cellulitis that responded well to antibx given at urgent care. Stopped the patch and hasn't had any further reactions. Neurology is managing     Hemochromotosis: diagnosed with positive DNA analysis but no sympotms. Ferritin normal. She does follow with hepatology. Last US 7/2019 showed fatty infiltration otherwise normal. Last AST/ALT normal, normal bilirubin. No history of blood sugar problems. Had bx with hepatology as well. Ferritin normal; mild increase iron sat noted on labs.     She has noticed numbness and tingling of the right leg. Initially started just at night but now occurring day as well. Sx started about 6 months ago and progressing. Worse with prolonged sitting. Has chronic low back pain. No falls or injuries.     Past Medical History:   Diagnosis Date    Cancer     breast stage iv, remission 2014    Fatty liver     Hereditary hemochromatosis 12/16/2015    History of TMJ syndrome     Hyperlipidemia     Hypertension      Hypoglycemia     Osteoarthritis     Parkinson disease     Torn meniscus        Family History   Problem Relation Age of Onset    Colon cancer Mother     Diabetes Father     Heart disease Father     Hemochromatosis Brother     Cirrhosis Brother         hemochromotosis    Breast cancer Neg Hx     Ovarian cancer Neg Hx        Social History     Socioeconomic History    Marital status:    Tobacco Use    Smoking status: Former Smoker     Packs/day: 0.25     Types: Cigarettes     Quit date: 2013     Years since quittin.6    Smokeless tobacco: Never Used   Substance and Sexual Activity    Alcohol use: Yes     Comment: drinking 6 peers per month, no daily use     Drug use: No    Sexual activity: Yes     Partners: Male     Birth control/protection: Surgical     Comment: .       Review of Systems   Constitutional: Negative for activity change, fatigue, fever and unexpected weight change.   HENT: Negative for congestion, ear pain, hearing loss, rhinorrhea and sore throat.    Eyes: Negative for redness and visual disturbance.   Respiratory: Negative for cough, shortness of breath and wheezing.    Cardiovascular: Negative for chest pain, palpitations and leg swelling.   Gastrointestinal: Negative for abdominal pain, blood in stool, constipation, diarrhea, nausea and vomiting.   Genitourinary: Negative for dysuria, frequency and urgency.   Musculoskeletal: Positive for back pain. Negative for joint swelling and neck pain.   Skin: Negative for color change, rash and wound.   Neurological: Positive for tremors (chronic with parkinsons). Negative for dizziness, weakness, light-headedness and headaches.         Objective:      Physical Exam  Vitals reviewed.   Constitutional:       General: She is not in acute distress.     Appearance: She is well-developed.   HENT:      Head: Normocephalic and atraumatic.      Right Ear: External ear normal.      Left Ear: External ear normal.      Nose:  Nose normal.   Eyes:      General:         Right eye: No discharge.         Left eye: No discharge.      Extraocular Movements: Extraocular movements intact.      Conjunctiva/sclera: Conjunctivae normal.      Pupils: Pupils are equal, round, and reactive to light.   Neck:      Thyroid: No thyromegaly.   Cardiovascular:      Rate and Rhythm: Normal rate and regular rhythm.      Heart sounds: No murmur heard.  Pulmonary:      Effort: Pulmonary effort is normal. No respiratory distress.      Breath sounds: Normal breath sounds. No wheezing or rales.   Abdominal:      General: Bowel sounds are normal. There is no distension.      Palpations: Abdomen is soft.      Tenderness: There is no abdominal tenderness.   Skin:     General: Skin is warm and dry.   Neurological:      Mental Status: She is alert and oriented to person, place, and time.      Cranial Nerves: No cranial nerve deficit.   Psychiatric:         Behavior: Behavior normal.         Thought Content: Thought content normal.         Assessment:       1. Benign essential HTN    2. Hyperlipidemia, unspecified hyperlipidemia type    3. Parkinson disease    4. Obesity (BMI 30-39.9)    5. Hereditary hemochromatosis    6. Right leg numbness    7. Hx of breast cancer    8. Anxiety    9. Status post mastectomy, unspecified laterality        Plan:       Rachel was seen today for follow-up.    Diagnoses and all orders for this visit:    Benign essential HTN  -     CBC Auto Differential; Future  -     Comprehensive Metabolic Panel; Future  -     Lipid Panel; Future  Chronic controlled  Continue medications at same dose  Low Na diet  Exercise, weight loss  Check BP and keep log for next visit    Hyperlipidemia, unspecified hyperlipidemia type  -     CBC Auto Differential; Future  -     Comprehensive Metabolic Panel; Future  -     Lipid Panel; Future  Chronic controlled  Cont statin same dose    Parkinson disease  -     CBC Auto Differential; Future  -     Comprehensive  Metabolic Panel; Future  -     Lipid Panel; Future  Chronic stable  Cont medicatoins same dose per neurology    Obesity (BMI 30-39.9)  -     CBC Auto Differential; Future  -     Comprehensive Metabolic Panel; Future  -     Lipid Panel; Future  Diet, exercise, weight loss    Hereditary hemochromatosis  -     CBC Auto Differential; Future  -     Comprehensive Metabolic Panel; Future  -     Lipid Panel; Future  Chronic stable  Ferritin normal  Follow labs    Right leg numbness  New problem  Worsening  Likely sciatica  Consider PT  Stretching PRN    Hx of breast cancer  Status post mastectomy, unspecified laterality  Cont oncology follow up who is managing meds and screenings    Anxiety  Chronic stable  Cont meds same dose   reviewed, no discrepancies             Health Maintenance:  -CRC: age 49 (mom and grandmother with CRC). Had polyps, unsure what kind. Done 2018--due 5 years  -PAP: s/p hysterectomy  -MMG: s/p left mastectomy, doing CT for screening  -tobacco: reports occasional use  -Vaccines: flu out of season  COVID completed    RTC 6 months with labs and PRN

## 2022-06-13 RX ORDER — AMANTADINE HYDROCHLORIDE 100 MG/1
CAPSULE, GELATIN COATED ORAL
Qty: 270 CAPSULE | Refills: 0 | Status: SHIPPED | OUTPATIENT
Start: 2022-06-13 | End: 2022-09-09

## 2022-08-08 DIAGNOSIS — E78.5 HYPERLIPIDEMIA, UNSPECIFIED HYPERLIPIDEMIA TYPE: ICD-10-CM

## 2022-08-08 NOTE — TELEPHONE ENCOUNTER
No new care gaps identified.  [facility] Embedded Care Gaps. Reference number: [id number]. 8/08/2022   11:51:15 AM CDT

## 2022-08-10 RX ORDER — ATORVASTATIN CALCIUM 40 MG/1
40 TABLET, FILM COATED ORAL DAILY
Qty: 90 TABLET | Refills: 1 | Status: SHIPPED | OUTPATIENT
Start: 2022-08-10 | End: 2023-02-09

## 2022-08-10 NOTE — TELEPHONE ENCOUNTER
Refill Decision Note   Rachel Leal  is requesting a refill authorization.  Brief Assessment and Rationale for Refill:  Approve     Medication Therapy Plan:       Medication Reconciliation Completed: No   Comments:     No Care Gaps recommended.     Note composed:9:23 AM 08/10/2022

## 2022-10-27 ENCOUNTER — LAB VISIT (OUTPATIENT)
Dept: LAB | Facility: HOSPITAL | Age: 57
End: 2022-10-27
Attending: INTERNAL MEDICINE
Payer: COMMERCIAL

## 2022-10-27 DIAGNOSIS — E83.110 HEREDITARY HEMOCHROMATOSIS: ICD-10-CM

## 2022-10-27 DIAGNOSIS — E66.9 OBESITY (BMI 30-39.9): ICD-10-CM

## 2022-10-27 DIAGNOSIS — E78.5 HYPERLIPIDEMIA, UNSPECIFIED HYPERLIPIDEMIA TYPE: ICD-10-CM

## 2022-10-27 DIAGNOSIS — I10 BENIGN ESSENTIAL HTN: ICD-10-CM

## 2022-10-27 DIAGNOSIS — G20.A1 PARKINSON DISEASE: ICD-10-CM

## 2022-10-27 LAB
ALBUMIN SERPL BCP-MCNC: 3.9 G/DL (ref 3.5–5.2)
ALP SERPL-CCNC: 88 U/L (ref 55–135)
ALT SERPL W/O P-5'-P-CCNC: 5 U/L (ref 10–44)
ANION GAP SERPL CALC-SCNC: 13 MMOL/L (ref 8–16)
AST SERPL-CCNC: 16 U/L (ref 10–40)
BASOPHILS # BLD AUTO: 0.01 K/UL (ref 0–0.2)
BASOPHILS NFR BLD: 0.2 % (ref 0–1.9)
BILIRUB SERPL-MCNC: 0.6 MG/DL (ref 0.1–1)
BUN SERPL-MCNC: 17 MG/DL (ref 6–20)
CALCIUM SERPL-MCNC: 9.1 MG/DL (ref 8.7–10.5)
CHLORIDE SERPL-SCNC: 104 MMOL/L (ref 95–110)
CHOLEST SERPL-MCNC: 170 MG/DL (ref 120–199)
CHOLEST/HDLC SERPL: 3.4 {RATIO} (ref 2–5)
CO2 SERPL-SCNC: 24 MMOL/L (ref 23–29)
CREAT SERPL-MCNC: 0.8 MG/DL (ref 0.5–1.4)
DIFFERENTIAL METHOD: ABNORMAL
EOSINOPHIL # BLD AUTO: 0.1 K/UL (ref 0–0.5)
EOSINOPHIL NFR BLD: 1.3 % (ref 0–8)
ERYTHROCYTE [DISTWIDTH] IN BLOOD BY AUTOMATED COUNT: 11.9 % (ref 11.5–14.5)
EST. GFR  (NO RACE VARIABLE): >60 ML/MIN/1.73 M^2
FERRITIN SERPL-MCNC: 124 NG/ML (ref 20–300)
GLUCOSE SERPL-MCNC: 87 MG/DL (ref 70–110)
HCT VFR BLD AUTO: 40.8 % (ref 37–48.5)
HDLC SERPL-MCNC: 50 MG/DL (ref 40–75)
HDLC SERPL: 29.4 % (ref 20–50)
HGB BLD-MCNC: 13.5 G/DL (ref 12–16)
IMM GRANULOCYTES # BLD AUTO: 0.02 K/UL (ref 0–0.04)
IMM GRANULOCYTES NFR BLD AUTO: 0.4 % (ref 0–0.5)
IRON SERPL-MCNC: 162 UG/DL (ref 30–160)
LDLC SERPL CALC-MCNC: 93.8 MG/DL (ref 63–159)
LYMPHOCYTES # BLD AUTO: 1 K/UL (ref 1–4.8)
LYMPHOCYTES NFR BLD: 17.8 % (ref 18–48)
MCH RBC QN AUTO: 31.5 PG (ref 27–31)
MCHC RBC AUTO-ENTMCNC: 33.1 G/DL (ref 32–36)
MCV RBC AUTO: 95 FL (ref 82–98)
MONOCYTES # BLD AUTO: 0.5 K/UL (ref 0.3–1)
MONOCYTES NFR BLD: 9.2 % (ref 4–15)
NEUTROPHILS # BLD AUTO: 3.8 K/UL (ref 1.8–7.7)
NEUTROPHILS NFR BLD: 71.1 % (ref 38–73)
NONHDLC SERPL-MCNC: 120 MG/DL
NRBC BLD-RTO: 0 /100 WBC
PLATELET # BLD AUTO: 173 K/UL (ref 150–450)
PMV BLD AUTO: 11.4 FL (ref 9.2–12.9)
POTASSIUM SERPL-SCNC: 3.7 MMOL/L (ref 3.5–5.1)
PROT SERPL-MCNC: 7.2 G/DL (ref 6–8.4)
RBC # BLD AUTO: 4.29 M/UL (ref 4–5.4)
SATURATED IRON: 67 % (ref 20–50)
SODIUM SERPL-SCNC: 141 MMOL/L (ref 136–145)
TOTAL IRON BINDING CAPACITY: 241 UG/DL (ref 250–450)
TRANSFERRIN SERPL-MCNC: 163 MG/DL (ref 200–375)
TRIGL SERPL-MCNC: 131 MG/DL (ref 30–150)
WBC # BLD AUTO: 5.35 K/UL (ref 3.9–12.7)

## 2022-10-27 PROCEDURE — 80053 COMPREHEN METABOLIC PANEL: CPT | Performed by: INTERNAL MEDICINE

## 2022-10-27 PROCEDURE — 84466 ASSAY OF TRANSFERRIN: CPT | Performed by: INTERNAL MEDICINE

## 2022-10-27 PROCEDURE — 85025 COMPLETE CBC W/AUTO DIFF WBC: CPT | Performed by: INTERNAL MEDICINE

## 2022-10-27 PROCEDURE — 80061 LIPID PANEL: CPT | Performed by: INTERNAL MEDICINE

## 2022-10-27 PROCEDURE — 36415 COLL VENOUS BLD VENIPUNCTURE: CPT | Performed by: INTERNAL MEDICINE

## 2022-10-27 PROCEDURE — 82728 ASSAY OF FERRITIN: CPT | Performed by: INTERNAL MEDICINE

## 2022-11-01 ENCOUNTER — HOSPITAL ENCOUNTER (OUTPATIENT)
Dept: PULMONOLOGY | Facility: HOSPITAL | Age: 57
Discharge: HOME | End: 2022-11-01
Attending: INTERNAL MEDICINE
Payer: COMMERCIAL

## 2022-11-01 ENCOUNTER — OFFICE VISIT (OUTPATIENT)
Dept: INTERNAL MEDICINE | Facility: CLINIC | Age: 57
End: 2022-11-01
Payer: COMMERCIAL

## 2022-11-01 VITALS
SYSTOLIC BLOOD PRESSURE: 104 MMHG | WEIGHT: 181.88 LBS | HEIGHT: 62 IN | BODY MASS INDEX: 33.47 KG/M2 | OXYGEN SATURATION: 96 % | RESPIRATION RATE: 16 BRPM | DIASTOLIC BLOOD PRESSURE: 70 MMHG | HEART RATE: 82 BPM

## 2022-11-01 DIAGNOSIS — Z90.10 STATUS POST MASTECTOMY, UNSPECIFIED LATERALITY: ICD-10-CM

## 2022-11-01 DIAGNOSIS — K76.0 FATTY LIVER: ICD-10-CM

## 2022-11-01 DIAGNOSIS — Z85.3 HX OF BREAST CANCER: ICD-10-CM

## 2022-11-01 DIAGNOSIS — R07.9 CHEST PAIN, UNSPECIFIED TYPE: ICD-10-CM

## 2022-11-01 DIAGNOSIS — F41.9 ANXIETY: ICD-10-CM

## 2022-11-01 DIAGNOSIS — I10 BENIGN ESSENTIAL HTN: Primary | ICD-10-CM

## 2022-11-01 DIAGNOSIS — E78.5 HYPERLIPIDEMIA, UNSPECIFIED HYPERLIPIDEMIA TYPE: ICD-10-CM

## 2022-11-01 DIAGNOSIS — G20.A1 PARKINSON DISEASE: ICD-10-CM

## 2022-11-01 DIAGNOSIS — E66.9 OBESITY (BMI 30-39.9): ICD-10-CM

## 2022-11-01 DIAGNOSIS — E83.110 HEREDITARY HEMOCHROMATOSIS: ICD-10-CM

## 2022-11-01 PROCEDURE — 99214 PR OFFICE/OUTPT VISIT, EST, LEVL IV, 30-39 MIN: ICD-10-PCS | Mod: S$GLB,,, | Performed by: INTERNAL MEDICINE

## 2022-11-01 PROCEDURE — 3074F PR MOST RECENT SYSTOLIC BLOOD PRESSURE < 130 MM HG: ICD-10-PCS | Mod: CPTII,S$GLB,, | Performed by: INTERNAL MEDICINE

## 2022-11-01 PROCEDURE — 1160F PR REVIEW ALL MEDS BY PRESCRIBER/CLIN PHARMACIST DOCUMENTED: ICD-10-PCS | Mod: CPTII,S$GLB,, | Performed by: INTERNAL MEDICINE

## 2022-11-01 PROCEDURE — 99999 PR PBB SHADOW E&M-EST. PATIENT-LVL IV: CPT | Mod: PBBFAC,,, | Performed by: INTERNAL MEDICINE

## 2022-11-01 PROCEDURE — 99214 OFFICE O/P EST MOD 30 MIN: CPT | Mod: S$GLB,,, | Performed by: INTERNAL MEDICINE

## 2022-11-01 PROCEDURE — 99999 PR PBB SHADOW E&M-EST. PATIENT-LVL IV: ICD-10-PCS | Mod: PBBFAC,,, | Performed by: INTERNAL MEDICINE

## 2022-11-01 PROCEDURE — 3078F DIAST BP <80 MM HG: CPT | Mod: CPTII,S$GLB,, | Performed by: INTERNAL MEDICINE

## 2022-11-01 PROCEDURE — 93005 ELECTROCARDIOGRAM TRACING: CPT

## 2022-11-01 PROCEDURE — 1159F MED LIST DOCD IN RCRD: CPT | Mod: CPTII,S$GLB,, | Performed by: INTERNAL MEDICINE

## 2022-11-01 PROCEDURE — 3078F PR MOST RECENT DIASTOLIC BLOOD PRESSURE < 80 MM HG: ICD-10-PCS | Mod: CPTII,S$GLB,, | Performed by: INTERNAL MEDICINE

## 2022-11-01 PROCEDURE — 3074F SYST BP LT 130 MM HG: CPT | Mod: CPTII,S$GLB,, | Performed by: INTERNAL MEDICINE

## 2022-11-01 PROCEDURE — 93010 EKG 12-LEAD: ICD-10-PCS | Mod: ,,, | Performed by: INTERNAL MEDICINE

## 2022-11-01 PROCEDURE — 1160F RVW MEDS BY RX/DR IN RCRD: CPT | Mod: CPTII,S$GLB,, | Performed by: INTERNAL MEDICINE

## 2022-11-01 PROCEDURE — 1159F PR MEDICATION LIST DOCUMENTED IN MEDICAL RECORD: ICD-10-PCS | Mod: CPTII,S$GLB,, | Performed by: INTERNAL MEDICINE

## 2022-11-01 PROCEDURE — 93010 ELECTROCARDIOGRAM REPORT: CPT | Mod: ,,, | Performed by: INTERNAL MEDICINE

## 2022-11-01 RX ORDER — DIAZEPAM 2 MG/1
2 TABLET ORAL NIGHTLY PRN
Qty: 30 TABLET | Refills: 0 | Status: SHIPPED | OUTPATIENT
Start: 2022-11-01 | End: 2023-04-21 | Stop reason: SDUPTHER

## 2022-11-01 RX ORDER — ESOMEPRAZOLE MAGNESIUM 40 MG/1
40 CAPSULE, DELAYED RELEASE ORAL
Qty: 30 CAPSULE | Refills: 11 | Status: SHIPPED | OUTPATIENT
Start: 2022-11-01 | End: 2023-11-03

## 2022-11-01 NOTE — PROGRESS NOTES
"Subjective:       Patient ID: Rachel Leal is a 57 y.o. female.    Chief Complaint: Follow-up (Right leg /), Ear Problem (Right ear "popping"/), and Tingling      HPI:  Patient is known to me and presents for follow up HTN, Parkinson's, DNA positive hemochromotosis, h/o breast cancer and anxiety.  Labs from 10/27/22 personally reviewed and discussed with the patient today.      HTN: on hyzaar 50-12.5mg. BP well controlled. Denies chest pains, SOB, LE edema.      GERD: on  nexium. Not sure it is working as well. No abd pain n/v/d/c. No dysphagia/odynophagia. However, when she initiallys tarted with chest pain saw cards and had negative work up (about 5 years ago, outside [facility]). Her pain improved on Nexium but worsening over alst few months. Most recently had episode lasting 3 hours and almost went to hospital. Pain in center of chest and feels like "my heart is ripping out". NO SOB, VAN, diaphoresis. FH CAD.       H/o breast cancer with metastasis to bone (stage VI at diagnosis): dx 2013. Follows with oncology. On arimidex and xgeva; s/p left mastectomy. Doing CT scans with oncology, no MMG.      Anxiety: Effexor and Xanax currently. Mood is well controlled. Denies depressed mood. Denies SI. Using Xanax some nights; not filling often. Her mom who has Parkinson's also was recently put on Valium and finds this is helping her a lot, wondering if she could try this at night instead.     HLD: on atorvastatin and omega 3. LDL trending down. No medication side effects.      Parkinson's: she is taking amantadine and sinemet. She was given neuopro but resulted in cellulitis that responded well to antibx given at urgent care. Stopped the patch and hasn't had any further reactions. Neurology is managing     Hemochromotosis: diagnosed with positive DNA analysis but no sympotms. Ferritin normal. She does follow with hepatology. Last US 7/2019 showed fatty infiltration otherwise normal. Last AST/ALT normal, normal " bilirubin. No history of blood sugar problems. Had bx with hepatology as well. Ferritin normal; mild increase iron sat noted on labs but stable. Reports had phlebotomy since saw me last.       She has noticed numbness and tingling of the right leg. Initially started just at night but now occurring day as well. Sx started about 6 months ago and progressing. Worse with prolonged sitting. Has chronic low back pain. No falls or injuries. Seeing specialist at [facility] who did CT scan about 2 months ago also showing spondylosis consistent with Ct here in .     Past Medical History:   Diagnosis Date    Cancer     breast stage iv, remission     Fatty liver     Hereditary hemochromatosis 2015    History of TMJ syndrome     Hyperlipidemia     Hypertension     Hypoglycemia     Osteoarthritis     Parkinson disease     Torn meniscus        Family History   Problem Relation Age of Onset    Colon cancer Mother     Diabetes Father     Heart disease Father     Hemochromatosis Brother     Cirrhosis Brother         hemochromotosis    Breast cancer Neg Hx     Ovarian cancer Neg Hx        Social History     Socioeconomic History    Marital status:    Tobacco Use    Smoking status: Former     Packs/day: 0.25     Types: Cigarettes     Quit date: 2013     Years since quittin.1    Smokeless tobacco: Never   Substance and Sexual Activity    Alcohol use: Yes     Comment: drinking 6 peers per month, no daily use     Drug use: No    Sexual activity: Yes     Partners: Male     Birth control/protection: Surgical     Comment: .       Review of Systems   Constitutional:  Negative for activity change, fatigue, fever and unexpected weight change.   HENT:  Negative for congestion, ear pain, hearing loss, rhinorrhea and sore throat.    Eyes:  Negative for redness and visual disturbance.   Respiratory:  Negative for cough, shortness of breath and wheezing.    Cardiovascular:  Positive for chest pain. Negative  for palpitations and leg swelling.   Gastrointestinal:  Negative for abdominal pain, constipation, diarrhea, nausea and vomiting.   Genitourinary:  Negative for dysuria, frequency and urgency.   Musculoskeletal:  Positive for arthralgias and back pain. Negative for joint swelling and neck pain.   Skin:  Negative for color change, rash and wound.   Neurological:  Negative for dizziness, tremors, weakness, light-headedness and headaches.       Objective:      Physical Exam  Vitals reviewed.   Constitutional:       General: She is not in acute distress.     Appearance: She is well-developed.   HENT:      Head: Normocephalic and atraumatic.      Right Ear: External ear normal.      Left Ear: External ear normal.      Nose: Nose normal.   Eyes:      General:         Right eye: No discharge.         Left eye: No discharge.      Extraocular Movements: Extraocular movements intact.      Conjunctiva/sclera: Conjunctivae normal.      Pupils: Pupils are equal, round, and reactive to light.   Neck:      Thyroid: No thyromegaly.   Cardiovascular:      Rate and Rhythm: Normal rate and regular rhythm.      Heart sounds: No murmur heard.  Pulmonary:      Effort: Pulmonary effort is normal. No respiratory distress.      Breath sounds: Normal breath sounds. No wheezing or rales.   Abdominal:      General: Bowel sounds are normal. There is no distension.      Palpations: Abdomen is soft.      Tenderness: There is no abdominal tenderness.   Skin:     General: Skin is warm and dry.   Neurological:      Mental Status: She is alert and oriented to person, place, and time.      Cranial Nerves: No cranial nerve deficit.   Psychiatric:         Behavior: Behavior normal.         Thought Content: Thought content normal.       Assessment:       1. Benign essential HTN    2. Hyperlipidemia, unspecified hyperlipidemia type    3. Anxiety    4. Parkinson disease    5. Hereditary hemochromatosis    6. Hx of breast cancer    7. Status post  mastectomy, unspecified laterality    8. Fatty liver    9. Obesity (BMI 30-39.9)    10. Chest pain, unspecified type        Plan:       Rachel was seen today for follow-up, ear problem and tingling.    Diagnoses and all orders for this visit:    Benign essential HTN  -     CBC Auto Differential; Future  -     Comprehensive Metabolic Panel; Future  -     TSH; Future  -     Lipid Panel; Future  Chronic controlled  Continue medications at same dose  Low Na diet  Exercise, weight loss  Check BP and keep log for next visit    Hyperlipidemia, unspecified hyperlipidemia type  -     CBC Auto Differential; Future  -     Comprehensive Metabolic Panel; Future  -     TSH; Future  -     Lipid Panel; Future  Chronic stable  Cont meds same dose    Anxiety  -     CBC Auto Differential; Future  -     Comprehensive Metabolic Panel; Future  -     TSH; Future  -     Lipid Panel; Future  -     diazePAM (VALIUM) 2 MG tablet; Take 1 tablet (2 mg total) by mouth nightly as needed for Insomnia.  Chronic stable  Cont effexor same dose  Will trial change to Valium from Xanax--a bit longer lasting and has some muscle relaxer effect     Parkinson disease  -     CBC Auto Differential; Future  -     Comprehensive Metabolic Panel; Future  -     TSH; Future  -     Lipid Panel; Future  -     diazePAM (VALIUM) 2 MG tablet; Take 1 tablet (2 mg total) by mouth nightly as needed for Insomnia.  Chronic stable  Cont neuro follow up  Cont meds per neuro    Hereditary hemochromatosis  -     CBC Auto Differential; Future  -     Comprehensive Metabolic Panel; Future  -     TSH; Future  -     Lipid Panel; Future  -     Ferritin; Future  -     Iron and TIBC; Future  Chronic stable  Labs seem to be at baseline    Hx of breast cancer  Status post mastectomy, unspecified laterality  Chronic stable  Cont meds per oncology    Fatty liver  -     CBC Auto Differential; Future  -     Comprehensive Metabolic Panel; Future  -     TSH; Future  -     Lipid Panel;  Future  Chronic stable  LFTs normal  Diet, exercise, weight loss    Obesity (BMI 30-39.9)  -     CBC Auto Differential; Future  -     Comprehensive Metabolic Panel; Future  -     TSH; Future  -     Lipid Panel; Future  Diet, exercise, weight loss    Chest pain, unspecified type  -     esomeprazole (NEXIUM) 40 MG capsule; Take 1 capsule (40 mg total) by mouth before breakfast.  -     EKG 12-lead; Future  Same pain she felt in the past that was relieved by PPI however occurring more frequently, more intense  Will trial increase dose  Update EKG  Discussed cardiology evaluation again but reports alexia abbott about 4-5 years ago  If EKG changes will def need to see cards again  If PPI does not improve sx will see cards again    ER precautions discussed           Health Maintenance:  -CRC: age 49 (mom and grandmother with CRC). Had polyps, unsure what kind. Done 2018--due 5 years  -PAP: s/p hysterectomy  -MMG: s/p left mastectomy, doing CT for screening  -tobacco: reports occasional use  -Vaccines: flu out of season  COVID completed

## 2023-01-18 ENCOUNTER — OFFICE VISIT (OUTPATIENT)
Dept: INTERNAL MEDICINE | Facility: CLINIC | Age: 58
End: 2023-01-18
Payer: COMMERCIAL

## 2023-01-18 ENCOUNTER — PATIENT OUTREACH (OUTPATIENT)
Dept: ADMINISTRATIVE | Facility: HOSPITAL | Age: 58
End: 2023-01-18
Payer: COMMERCIAL

## 2023-01-18 VITALS
OXYGEN SATURATION: 95 % | HEIGHT: 62 IN | HEART RATE: 70 BPM | SYSTOLIC BLOOD PRESSURE: 132 MMHG | BODY MASS INDEX: 34.37 KG/M2 | RESPIRATION RATE: 16 BRPM | DIASTOLIC BLOOD PRESSURE: 78 MMHG | WEIGHT: 186.75 LBS

## 2023-01-18 DIAGNOSIS — G20.A1 PARKINSON DISEASE: Primary | ICD-10-CM

## 2023-01-18 DIAGNOSIS — E83.110 HEREDITARY HEMOCHROMATOSIS: ICD-10-CM

## 2023-01-18 DIAGNOSIS — E66.9 OBESITY (BMI 30-39.9): ICD-10-CM

## 2023-01-18 DIAGNOSIS — Z85.89 HISTORY OF CANCER METASTATIC TO BONE: ICD-10-CM

## 2023-01-18 DIAGNOSIS — Z85.3 HX OF BREAST CANCER: ICD-10-CM

## 2023-01-18 PROCEDURE — 1159F MED LIST DOCD IN RCRD: CPT | Mod: CPTII,S$GLB,, | Performed by: INTERNAL MEDICINE

## 2023-01-18 PROCEDURE — 99214 PR OFFICE/OUTPT VISIT, EST, LEVL IV, 30-39 MIN: ICD-10-PCS | Mod: S$GLB,,, | Performed by: INTERNAL MEDICINE

## 2023-01-18 PROCEDURE — 3075F PR MOST RECENT SYSTOLIC BLOOD PRESS GE 130-139MM HG: ICD-10-PCS | Mod: CPTII,S$GLB,, | Performed by: INTERNAL MEDICINE

## 2023-01-18 PROCEDURE — 3075F SYST BP GE 130 - 139MM HG: CPT | Mod: CPTII,S$GLB,, | Performed by: INTERNAL MEDICINE

## 2023-01-18 PROCEDURE — 3008F BODY MASS INDEX DOCD: CPT | Mod: CPTII,S$GLB,, | Performed by: INTERNAL MEDICINE

## 2023-01-18 PROCEDURE — 3078F DIAST BP <80 MM HG: CPT | Mod: CPTII,S$GLB,, | Performed by: INTERNAL MEDICINE

## 2023-01-18 PROCEDURE — 99999 PR PBB SHADOW E&M-EST. PATIENT-LVL IV: ICD-10-PCS | Mod: PBBFAC,,, | Performed by: INTERNAL MEDICINE

## 2023-01-18 PROCEDURE — 99999 PR PBB SHADOW E&M-EST. PATIENT-LVL IV: CPT | Mod: PBBFAC,,, | Performed by: INTERNAL MEDICINE

## 2023-01-18 PROCEDURE — 1160F RVW MEDS BY RX/DR IN RCRD: CPT | Mod: CPTII,S$GLB,, | Performed by: INTERNAL MEDICINE

## 2023-01-18 PROCEDURE — 99214 OFFICE O/P EST MOD 30 MIN: CPT | Mod: S$GLB,,, | Performed by: INTERNAL MEDICINE

## 2023-01-18 PROCEDURE — 1160F PR REVIEW ALL MEDS BY PRESCRIBER/CLIN PHARMACIST DOCUMENTED: ICD-10-PCS | Mod: CPTII,S$GLB,, | Performed by: INTERNAL MEDICINE

## 2023-01-18 PROCEDURE — 3078F PR MOST RECENT DIASTOLIC BLOOD PRESSURE < 80 MM HG: ICD-10-PCS | Mod: CPTII,S$GLB,, | Performed by: INTERNAL MEDICINE

## 2023-01-18 PROCEDURE — 1159F PR MEDICATION LIST DOCUMENTED IN MEDICAL RECORD: ICD-10-PCS | Mod: CPTII,S$GLB,, | Performed by: INTERNAL MEDICINE

## 2023-01-18 PROCEDURE — 3008F PR BODY MASS INDEX (BMI) DOCUMENTED: ICD-10-PCS | Mod: CPTII,S$GLB,, | Performed by: INTERNAL MEDICINE

## 2023-01-18 NOTE — PROGRESS NOTES
Subjective:       Patient ID: Rachel Leal is a 57 y.o. female.    Chief Complaint: Medication Problem (Need to discuss osteoporosis medications and patient would like an increase in valium)      HPI:  Patient is known to me and presents to discuss eval for osteoporosis. She has had fx of right shin, b/l feet. Reports she has not had any trauma prior to fractures.   Had DEXA done some osteoporosis changes but she is already on Xgeva X6mbtwa for h/o malignancy to the bone and Vit D 1000mg daily per oncology. She does report walking on outer soles of feet due to her issues with Parkinson's and could be stress causing fx of feet. She just wanted to discuss to be sure no change in treatment needed.   DEXA done 6 months ago (sent to scan)      At last visit we switched from Xanax to Valium for insomnia. Tried 2mg and was not effective. Now taking 2 tabs at night and working very well. No side effects. She is using very sparingly; has only used 2-3 times since I first prescribed in 2022. .       Past Medical History:   Diagnosis Date    Cancer     breast stage iv, remission     Fatty liver     Hereditary hemochromatosis 2015    History of TMJ syndrome     Hyperlipidemia     Hypertension     Hypoglycemia     Osteoarthritis     Parkinson disease     Torn meniscus        Family History   Problem Relation Age of Onset    Colon cancer Mother     Diabetes Father     Heart disease Father     Hemochromatosis Brother     Cirrhosis Brother         hemochromotosis    Breast cancer Neg Hx     Ovarian cancer Neg Hx        Social History     Socioeconomic History    Marital status:    Tobacco Use    Smoking status: Former     Packs/day: 0.25     Types: Cigarettes     Quit date: 2013     Years since quittin.3    Smokeless tobacco: Never   Substance and Sexual Activity    Alcohol use: Yes     Comment: drinking 6 peers per month, no daily use     Drug use: No    Sexual activity: Yes     Partners: Male      Birth control/protection: Surgical     Comment: .       Review of Systems   Constitutional:  Negative for activity change, fatigue, fever and unexpected weight change.   HENT:  Negative for congestion, ear pain, hearing loss, rhinorrhea, sore throat and tinnitus.    Eyes:  Negative for pain, redness and visual disturbance.   Respiratory:  Negative for cough, shortness of breath and wheezing.    Cardiovascular:  Negative for chest pain, palpitations and leg swelling.   Gastrointestinal:  Negative for abdominal pain, blood in stool, constipation, diarrhea, nausea and vomiting.   Genitourinary:  Negative for dysuria, frequency, pelvic pain and urgency.   Musculoskeletal:  Negative for back pain, joint swelling and neck pain.   Skin:  Negative for color change, rash and wound.   Neurological:  Negative for dizziness, tremors, weakness, light-headedness and headaches.       Objective:      Physical Exam  Vitals reviewed.   Constitutional:       General: She is not in acute distress.     Appearance: She is well-developed.   HENT:      Head: Normocephalic and atraumatic.      Right Ear: External ear normal.      Left Ear: External ear normal.      Nose: Nose normal.   Eyes:      General:         Right eye: No discharge.         Left eye: No discharge.      Extraocular Movements: Extraocular movements intact.      Conjunctiva/sclera: Conjunctivae normal.      Pupils: Pupils are equal, round, and reactive to light.   Neck:      Thyroid: No thyromegaly.   Cardiovascular:      Rate and Rhythm: Normal rate and regular rhythm.      Heart sounds: No murmur heard.    No friction rub. No gallop.   Pulmonary:      Effort: Pulmonary effort is normal. No respiratory distress.      Breath sounds: Normal breath sounds. No wheezing or rales.   Abdominal:      General: Bowel sounds are normal. There is no distension.      Palpations: Abdomen is soft.      Tenderness: There is no abdominal tenderness.   Skin:     General: Skin  is warm and dry.   Neurological:      Mental Status: She is alert and oriented to person, place, and time.      Cranial Nerves: No cranial nerve deficit.   Psychiatric:         Behavior: Behavior normal.         Thought Content: Thought content normal.       Assessment:       1. Parkinson disease    2. Hereditary hemochromatosis    3. Obesity (BMI 30-39.9)    4. Hx of breast cancer    5. History of cancer metastatic to bone        Plan:       1. Parkinson disease  Chronic stable  Cont meds per neuro recommendations  She does walk on outside of feet and discussed seeing podiatry for shoe inserts, etc  Also discussed referring for PT to help with gait issues, prevent contractures and possibly even brace to correct walking. Will follow    2. Hereditary hemochromatosis  Chronic stable  Cont current management, recent phlebotomy reports  Overview:  Liver biopsy 12/2017 - Mild steatosis, macrovesicular 20% and microvesicular 10%. Mild to moderate siderosis, 2+, within hepatocytes. No fibrosis      3. Obesity (BMI 30-39.9)  Chronic stable  Diet, exercise, weith loss    4. Hx of breast cancer  Sees oncology outside [facility]  Cont meds per oncology recommendations    5. History of cancer metastatic to bone  Sees oncology outside [facility]  Recent DEXA sent to media  Already on Xgeva for this history so would not recommend change in management from an osteoporosis standpoint; fx more likely stress related    RTC as scheudled for routine and PRN

## 2023-03-23 ENCOUNTER — TELEPHONE (OUTPATIENT)
Dept: NEUROLOGY | Facility: CLINIC | Age: 58
End: 2023-03-23
Payer: COMMERCIAL

## 2023-03-23 NOTE — TELEPHONE ENCOUNTER
----- Message from Dalia Catherine sent at 3/23/2023  1:49 PM CDT -----  Regarding: refill  Contact: [phone]  Pt requesting refill of medication listed. Pls call to discuss.   amantadine HCL (SYMMETREL) 100 mg capsule    ..  [facility]/pharmacy #[id number] - [place] - [address]  [address]  [place] [address]  Phone: [phone] Fax: [phone]

## 2023-03-24 NOTE — TELEPHONE ENCOUNTER
Called Pt to f/u regarding medication refill.     Pt confirmed refill. No further action needed at this time.

## 2023-04-21 ENCOUNTER — TELEPHONE (OUTPATIENT)
Dept: INTERNAL MEDICINE | Facility: CLINIC | Age: 58
End: 2023-04-21
Payer: COMMERCIAL

## 2023-04-21 DIAGNOSIS — F41.9 ANXIETY: ICD-10-CM

## 2023-04-21 DIAGNOSIS — G20.A1 PARKINSON DISEASE: ICD-10-CM

## 2023-04-21 RX ORDER — DIAZEPAM 2 MG/1
4 TABLET ORAL NIGHTLY PRN
Qty: 60 TABLET | Refills: 0 | Status: SHIPPED | OUTPATIENT
Start: 2023-04-21 | End: 2023-09-27

## 2023-04-21 NOTE — TELEPHONE ENCOUNTER
----- Message from Stephany Hemphill sent at 2023  8:41 AM CDT -----  Contact: pt  Rachel Leal  MRN: [id number]  : 1965  PCP: Cata Leal  Home Phone      [phone]  Work Phone      Not on file.  Mobile          [phone]  Home Phone      [phone]      MESSAGE:     Pt states she needs a refill of diazePAM (VALIUM) 2 MG tablet but states Sr. Leal said she was going to up the MG. Please send to [facility] in [place].        Please advise  [phone]

## 2023-06-08 RX ORDER — CARBIDOPA AND LEVODOPA 25; 100 MG/1; MG/1
TABLET ORAL
Qty: 540 TABLET | Refills: 0 | Status: SHIPPED | OUTPATIENT
Start: 2023-06-08 | End: 2023-09-11 | Stop reason: SDUPTHER

## 2023-06-25 DIAGNOSIS — G20.A1 PARKINSON DISEASE: ICD-10-CM

## 2023-06-26 RX ORDER — AMANTADINE HYDROCHLORIDE 100 MG/1
CAPSULE, GELATIN COATED ORAL
Qty: 270 CAPSULE | Refills: 0 | Status: SHIPPED | OUTPATIENT
Start: 2023-06-26 | End: 2023-09-11 | Stop reason: SDUPTHER

## 2023-08-07 ENCOUNTER — TELEPHONE (OUTPATIENT)
Dept: NEUROLOGY | Facility: CLINIC | Age: 58
End: 2023-08-07
Payer: COMMERCIAL

## 2023-08-07 NOTE — TELEPHONE ENCOUNTER
----- Message from Lubna Torres sent at 8/7/2023  1:42 PM CDT -----  Regarding: Appt Access  Contact: pt [phone]  Pt calling to request an appt for naomi Sommer call

## 2023-08-17 ENCOUNTER — TELEPHONE (OUTPATIENT)
Dept: NEUROLOGY | Facility: CLINIC | Age: 58
End: 2023-08-17
Payer: COMMERCIAL

## 2023-08-17 NOTE — TELEPHONE ENCOUNTER
----- Message from Rama Lorenzana sent at 8/17/2023 11:13 AM CDT -----  Regarding: pt advice  Contact: [phone]  Pt calling in regards to scheduling, and also needing a for Physical Therapy Orders, due Parkinson sent over to [facility]. Pls call

## 2023-08-18 ENCOUNTER — TELEPHONE (OUTPATIENT)
Dept: NEUROLOGY | Facility: CLINIC | Age: 58
End: 2023-08-18
Payer: COMMERCIAL

## 2023-08-18 NOTE — TELEPHONE ENCOUNTER
----- Message from Elyssa Alegria sent at 8/18/2023  8:24 AM CDT -----  Regarding: Orders/Appointment URGENT  Contact: [phone]  Patient requesting orders for physical therapy for Parkinson's to be sent to [facility] in [place]. Please call patient to confirm orders have been sent as soon as possible due to repeat request. Please call and schedule appointment as well.

## 2023-09-07 LAB — CRC RECOMMENDATION EXT: NORMAL

## 2023-09-11 ENCOUNTER — PATIENT OUTREACH (OUTPATIENT)
Dept: ADMINISTRATIVE | Facility: HOSPITAL | Age: 58
End: 2023-09-11
Payer: COMMERCIAL

## 2023-09-11 ENCOUNTER — OFFICE VISIT (OUTPATIENT)
Dept: NEUROLOGY | Facility: CLINIC | Age: 58
End: 2023-09-11
Payer: COMMERCIAL

## 2023-09-11 DIAGNOSIS — G20.A1 PARKINSON DISEASE: Primary | ICD-10-CM

## 2023-09-11 PROCEDURE — 99215 PR OFFICE/OUTPT VISIT, EST, LEVL V, 40-54 MIN: ICD-10-PCS | Mod: 95,,, | Performed by: PSYCHIATRY & NEUROLOGY

## 2023-09-11 PROCEDURE — 99215 OFFICE O/P EST HI 40 MIN: CPT | Mod: 95,,, | Performed by: PSYCHIATRY & NEUROLOGY

## 2023-09-11 RX ORDER — CARBIDOPA AND LEVODOPA 25; 100 MG/1; MG/1
2 TABLET ORAL 4 TIMES DAILY
Qty: 540 TABLET | Refills: 12 | Status: SHIPPED | OUTPATIENT
Start: 2023-09-11 | End: 2024-03-26 | Stop reason: SDUPTHER

## 2023-09-11 RX ORDER — AMANTADINE HYDROCHLORIDE 100 MG/1
100 CAPSULE, GELATIN COATED ORAL 3 TIMES DAILY
Qty: 270 CAPSULE | Refills: 0 | Status: SHIPPED | OUTPATIENT
Start: 2023-09-11 | End: 2023-12-20 | Stop reason: SDUPTHER

## 2023-09-11 NOTE — PROGRESS NOTES
"The patient location is: HOME  The chief complaint leading to visit is: PDism  1. Parkinson disease  Ambulatory referral/consult to Physical/Occupational Therapy        Visit type: Virtual visit with synchronous audio and video  Total time spent with patient: 20  Each patient to whom he or she provides medical services by telemedicine is:  (1) informed of the relationship between the physician and patient and the respective role of any other health care provider with respect to management of the patient; and (2) notified that he or she may decline to receive medical services by telemedicine and may withdraw from such care at any time.   MOVEMENT DISORDERS [facility]INIC    PCP/Referring Provider: No referring provider defined for this encounter.  Date of Service: 9/11/2023    Chief Complaint: PD    Interval Hx    Since last visit,     Started carbidopa/levodopa 25/100mg 1.5 tabs Q5 hours  ONtime is 3 hours  Offtime- tremors and slowness  Continues amantadine 100mg PO TID  No hallucinations but she is concerned to oversode Ldopa as her mom has halluncinations    Time is 4 hours after which she drags a foot and mild tremors  She is more concerned with tremors    No dizziness, nausea, halluciantions  Some insomnia    Walking better  Hand dexterity is better    No dyskinesias    Falls: none  Assist Device: none    Mother on hospice for similar dz    Med HX  Started ropinirole 0.25mg PO TID and felt better but at 0.5mg PO TID felt worse, was feeling weak and stopped    "PriorHPI: Rachel Leal is a R HANDED 58 y.o. female with a medical issues significant for hemochromatosis, HTN, HL, Breast Cancer s/p chemotherapy, who presents with PDism. Notes her PDism began at age 53. R sided rigidity and arm stiffness began and progressed. "Just like mom." At this point has a moderate R hand resting tremor and     Started carbidopa/levodopa 25/100mg 1 tab daily 2017  Currently on Amantadine 100mg Po TID. This helps her tremor. " "8/2/PM  No cognitive issues or leg swelling.  Has 4 hour ONtimes with Amantadine  When she was on both carbidopa/levodopa and amantadine she "felt worse"    R foot inversions when OFF  No orthostasis  No gambling tesnencies    [place] descent  Mother and grandmother had PDism"    Review of Systems:   Review of Systems   Constitutional: Negative for fever.   HENT: Negative for congestion.    Eyes: Negative for double vision.   Respiratory: Negative for cough and shortness of breath.    Cardiovascular: Negative for chest pain and leg swelling.   Gastrointestinal: Negative for nausea.   Genitourinary: Negative for dysuria.   Musculoskeletal: Negative for falls.   Skin: Negative for rash.   Neurological: Positive for tremors and speech change. Negative for headaches.   Psychiatric/Behavioral: Negative for depression.         Current Medications:  Outpatient Encounter Medications as of 9/11/2023   Medication Sig Dispense Refill    amantadine HCL (SYMMETREL) 100 mg capsule TAKE 1 CAPSULE (100 MG TOTAL) BY MOUTH 3 TIMES A  capsule 0    anastrozole (ARIMIDEX) 1 mg Tab Take 1 mg by mouth once daily.      atorvastatin (LIPITOR) 40 MG tablet TAKE 1 TABLET BY MOUTH EVERY DAY 90 tablet 2    carbidopa-levodopa  mg (SINEMET)  mg per tablet TAKE 1.5 TABLETS BY MOUTH 4 TIMES A  tablet 0    COCONUT OIL ORAL Take 2 capsules by mouth once daily.      denosumab (XGEVA) 120 mg/1.7 mL (70 mg/mL) Soln Inject 120 mg into the skin. Every 3 months      diazePAM (VALIUM) 2 MG tablet Take 2 tablets (4 mg total) by mouth nightly as needed for Insomnia. 60 tablet 0    docusate sodium (STOOL SOFTENER ORAL) Take by mouth.      esomeprazole (NEXIUM) 40 MG capsule Take 1 capsule (40 mg total) by mouth before breakfast. 30 capsule 11    losartan-hydrochlorothiazide 50-12.5 mg (HYZAAR) 50-12.5 mg per tablet TAKE 1 TABLET BY MOUTH EVERY DAY 90 tablet 2    oxybutynin (DITROPAN) 5 MG Tab TAKE 1 TABLET BY MOUTH EVERY DAY 90 tablet " 3    oxybutynin (DITROPAN-XL) 5 MG TR24 Take 5 mg by mouth once daily.      venlafaxine (EFFEXOR-XR) 75 MG 24 hr capsule Take 75 mg by mouth once daily.       vitamin D 1000 units Tab Take 1,000 mg by mouth once daily.        No facility-administered encounter medications on file as of 2023.       Past Medical History:  Patient Active Problem List   Diagnosis    Hyperlipidemia    Hx of breast cancer    Hereditary hemochromatosis    Anxiety    Parkinson disease    Fatty liver    Obesity (BMI 30-39.9)    Condyloma    Benign essential HTN    Right leg numbness    S/P mastectomy    History of cancer metastatic to bone       Past Surgical History:  Past Surgical History:   Procedure Laterality Date    BREAST SURGERY      augmentation     SECTION      COLONOSCOPY      EXCISION OF CONDYLOMA N/A 2019    Procedure: EXCISION OF ANAL CANAL CONDYLOMA;  Surgeon: Yunior Starks MD;  Location: [facility];  Service: Colon and Rectal;  Laterality: N/A;    EYE SURGERY      FOOT SURGERY Right 06/15/2022    FULGURATION OF ANAL CONDYLOMA N/A 2019    Procedure: FULGURATION OF ANAL CANAL CONDYLOMA;  Surgeon: Yunior Starks MD;  Location: [facility];  Service: Colon and Rectal;  Laterality: N/A;    HAND SURGERY      HYSTERECTOMY      TL BSO    LIVER BIOPSY  2017    Mild steatosis, macrovesicular 20% and microvesicular 10%. Mild to moderate siderosis, 2+, within hepatocytes. No fibrosis    MASTECTOMY Left     TONSILLECTOMY         Current Living Situation: home    Social:  Social History     Socioeconomic History    Marital status:    Tobacco Use    Smoking status: Former     Current packs/day: 0.00     Types: Cigarettes     Quit date: 2013     Years since quitting: 10.0    Smokeless tobacco: Never   Substance and Sexual Activity    Alcohol use: Yes     Comment: drinking 6 peers per month, no daily use     Drug use: No    Sexual activity: Yes     Partners: Male     Birth control/protection: Surgical      Comment: .       Family History:  Family History   Problem Relation Age of Onset    Colon cancer Mother     Diabetes Father     Heart disease Father     Hemochromatosis Brother     Cirrhosis Brother         hemochromotosis    Breast cancer Neg Hx     Ovarian cancer Neg Hx        PHYSICAL:  LMP 01/17/2012     Physical Exam  Constitutional: Well-developed, well-nourished, appears stated age  Eyes: No scleral icterus  ENT: Moist oral mucosa  Cardiovascular: No lower extremity edema   Respiratory: No labored breathing   Skin: No rash   Hematologic: No bruising    Other: GI/ deferred   Mental status: Alert and oriented to person, place, time, and situation;   follows commands  Speech: normal (not dysarthric), no aphasia  Cranial nerves:            CN II: Pupils mid-position and equal, not tested light or accommodation  CN III, IV, VI: Extraocular movements full, no nystagmus visualized  CN V: Not tested   CN VII: Face strong and symmetric bilaterally   CN VIII: Hearing intact to voice and conversation   CN IX, X: Palate raises midline and symmetric   CN XI: Strong shoulder shrug B/L  CN XII: Tongue appears midline   Motor: Normal bulk by appearance, no drift   Sensory: Not tested    Gait: fairly normal (out of proportion) to arms)  Deep tendon reflexes: Not tested  Movement/Coordination                    Mod hypophonic speech.                     Mod facial masking..                   No tremor with rest, posture, kinesis, or intention.   FNF NL  R hand and R foot dystonia mild                  L hand tremor mild    ? Finger taps Finger flicks RAE Heel taps   Left 2+ 2+ - 2+   Right 3+ 3+ - 2+     Laboratory Data:  NA    Imaging:  Reported as NL    Assessment//Plan:   Problem List Items Addressed This Visit          Neuro    Parkinson disease - Primary    Current Assessment & Plan     Parkinsonism, familial autosomal dominant. Mother has prominent BG calcifications - possible Fahr's dz. Rachel is under-dosed at  them moment.   Due to OFFtime suggested  Continues carbidopa/levodopa 25/100mg 1.5 tab PO TID to 5x daily  Try every other dose 2 tabs    Continue Amantadine 100mg PO TID    Discussed increasing amantadine or trying rytary but will perhaps try these later if needed           Relevant Orders    Ambulatory referral/consult to Physical/Occupational Therapy         Anu Gresham MD, MS [facility]  Department of Neurology  Movement Disorders

## 2023-09-11 NOTE — PROGRESS NOTES
Chart reviewed, immunization record updated.  No new results noted on [facility] or [facility] web site.  Care Everywhere updated.   Patient care coordination note updated.   LOV with PCP 01/18/2023.   Received external Colonoscopy collected/ completed on 09/07/2023, updated to .

## 2023-09-11 NOTE — ASSESSMENT & PLAN NOTE
Parkinsonism, familial autosomal dominant. Mother has prominent BG calcifications - possible Fahr's dz. Rachel is under-dosed at them moment.   Due to OFFtime suggested  Continues carbidopa/levodopa 25/100mg 1.5 tab PO TID to 5x daily  Try every other dose 2 tabs    Continue Amantadine 100mg PO TID    Discussed increasing amantadine or trying rytary but will perhaps try these later if needed

## 2023-09-18 ENCOUNTER — CLINICAL SUPPORT (OUTPATIENT)
Dept: REHABILITATION | Facility: HOSPITAL | Age: 58
End: 2023-09-18
Attending: PSYCHIATRY & NEUROLOGY
Payer: COMMERCIAL

## 2023-09-18 DIAGNOSIS — R26.89 FUNCTIONAL GAIT ABNORMALITY: ICD-10-CM

## 2023-09-18 DIAGNOSIS — R26.89 BALANCE DISORDER: ICD-10-CM

## 2023-09-18 DIAGNOSIS — G20.A1 PARKINSON DISEASE: ICD-10-CM

## 2023-09-18 PROCEDURE — 97162 PT EVAL MOD COMPLEX 30 MIN: CPT | Mod: PN

## 2023-09-18 PROCEDURE — 97112 NEUROMUSCULAR REEDUCATION: CPT | Mod: PN

## 2023-09-18 NOTE — PROGRESS NOTES
[facility]  Physical Therapy Neurological Rehabilitation Initial Evaluation     Name: Rachel [facility]  Clinic Number: [id number]    Therapy Diagnosis:   Encounter Diagnoses   Name Primary?    Parkinson disease     Balance disorder     Functional gait abnormality      Physician: Anu Gresham MD    Physician Orders: PT Eval and Treat   Medical Diagnosis from Referral: G20 (ICD-10-CM) - Parkinson disease   Evaluation Date: 9/18/2023  Authorization Period Expiration: 9/10/2024  Plan of Care Expiration: 12/31/2023  Progress Note Due: Visit 4   Visit # / Visits authorized: 1/ 1  FOTO: 1/3    Precautions: Standard and Fall    Time In: 1015  Time Out: 1100  Total Billable Time: 45 minutes    Subjective      Date of onset: diagnosed with parkinson's disease about 6 years ago     History of current condition - Rachel reports: she reports has issues with coordination more than anything. She states she has difficulty washing dishes at times. Her symptoms are more right sided. She finds she walks crooked, and her right foot appears to turn on her. She also finds it drags, she does she find she has close calls. She also reports difficulty sleeping. Uses a recliner for sleeping      Imaging, none:     Prior Therapy: she has [facility], week before she finishes up. She was being seen for her ankle.   Social History:  lives with their spouse, 2 dogs wants to return to walking them   Falls: no   DME:  none   Home Environment: 6 steps to enter her home with a rail    Exercise Routine / History: walking 2.5 mi/day walking. But hasn't done anything in the last year    Family Present at time of Eval: self only    Occupation: retired   Prior Level of Function: independent   Current Level of Function: increase time to perform most tasks, especially fine motor type tasks.     Pain:  Current 1/10, in bilateral ankles.     Patient's goals: to be walk her dogs, be able to get up/down off the floor to play with  "grandchildren,     Medical History:   Past Medical History:   Diagnosis Date    Cancer     breast stage iv, remission 2014    Fatty liver     Hereditary hemochromatosis 2015    History of TMJ syndrome     Hyperlipidemia     Hypertension     Hypoglycemia     Osteoarthritis     Parkinson disease     Torn meniscus        Surgical History:   Rachel Leal  has a past surgical history that includes Breast surgery; Eye surgery;  section; Hand surgery; Hysterectomy; Colonoscopy; Liver biopsy (2017); Tonsillectomy; Mastectomy (Left); Excision of condyloma (N/A, 2019); Fulguration of anal condyloma (N/A, 2019); and Foot surgery (Right, 06/15/2022).    Medications:   Rachel has a current medication list which includes the following prescription(s): amantadine hcl, anastrozole, atorvastatin, carbidopa-levodopa  mg, coconut oil, denosumab, diazepam, docusate sodium, esomeprazole, losartan-hydrochlorothiazide 50-12.5 mg, oxybutynin, oxybutynin, venlafaxine, and vitamin d.    Allergies:   Review of patient's allergies indicates:   Allergen Reactions    Neupro [rotigotine] Dermatitis        Objective        Gait  Speed: ***   Pattern: ***   Device: ***   Assistance: ***    Posture: ***     Right Left Right Left    Strength Strength AROM/PROM AROM/PROM   Hip flexion       Extension       Glute max       Abduction       Adduction       ER       IR       Knee ext       Knee flexion       DF       PF       INV       EV             Balance/Proprioception:  Single leg stance: R:  16 L:  14  Tandem stance: ***    Palpation: ***    Sensation: ***    Coordination:   Test Left Right Comment   Heel<>Watts      Rapid Movements        Special Tests:   ***  TUG:  10.5"  30 Second Sit to Stand: 8 with moderate upper extremity support   Mini BEST test: MINI-BEST TEST    Anticipatory    1. Sit to Stand  (2) Normal: Comes to stand without use of hands and stabilizes independently    2. Rise to Toes  (1) " Moderate: Heels up, but not full range (smaller than when holding hands), OR noticeable instability for 3 seconds    3. Stand on One Leg (Use the side with the lowest score to calculate sub-score and total score)  (1) Moderate: < 20 seconds (Right Leg) and (1) Moderate: < 20 seconds (Left Leg)    Sub Score: 4/6    Reactive Postural Control    4. Compensatory Stepping Correction - Forward  (0) Severe: No step, OR would fall if not caught, OR falls spontaneously    5. Compensatory Stepping Correction - Backward  (0) Severe: No step, OR would fall if not caught, OR falls spontaneously    6. Compensatory Stepping Correction - Lateral (Use the side with the lowest score to calculate sub-score and total score)  (0) Severe: Falls, or cannot step (Right Leg) and (0) Severe: Falls, or cannot step (Left Leg)    Sub Score: 0/6    Sensory Orientation  7. Stance (Feet Together); Eyes Open, Firm Surface  (2) Normal: 30 sec    8. Stance (Feet Together); Eyes Closed; Foam Surface  (1) Moderate: < 30 sec    9. Incline - Eyes Closed  (2) Normal: Stands independently 30 sec and aligns with gravity    Sub Score: 5/6    Dynamic Gait  10. Change in Gait Speed  (1) Moderate: Unable to change walking speed or signs or imbalance    11. Walk with Head Turns - Horizontal  (1) Moderate: Performs head turns with reduction in gait speed    12. Walk with Pivot Turns  (1) Moderate: Turns with feet close SLOW (>/ 4 steps) with good balance    13. Step Over Obstacles  (1) Moderate: Steps over box but touches box OR displays cautious behavior by slowing gait    14. Timed Up & Go with Dual Task [3 meter walk]  TUG: 10.5 seconds; Dual Task TU,7 seconds  (1) Moderate: Dual Task affects either counting OR walking (>10%) when compared to the TUG without Dual Task    Total Score: 14/28     CMS Impairment/Limitation/Restriction for FOTO Degenerative CNS Survey    Therapist reviewed FOTO scores for Rachel Leal on 2023.   FOTO documents  "entered into [employer] - see Media section.    Limitation Score: 57%       Treatment     Total Treatment time separate from Evaluation: *** minutes    Rachel received the treatments listed below:      therapeutic exercises to develop {AMB PT PROGRESS OBJECTIVE:07928} for *** minutes including:  ***    manual therapy techniques: {AMB PT PROGRESS MANUAL THERAPY:50210} were applied to the: *** for *** minutes, including:  ***    neuromuscular re-education activities to improve: {AMB PT PROGRESS NEURO RE-ED:43211} for *** minutes. The following activities were included:  ***    therapeutic activities to improve functional performance for ***  minutes, including:  ***    gait training to improve functional mobility and safety for ***  minutes, including:  ***    direct contact modalities after being cleared for contraindications: {AMB PT PROGRESS DIRECT CONTACT MODES:23071}    supervised modalities after being cleared for contradictions: {AMB PT SUPERVISED MODES:29899}    hot pack for *** minutes to ***.    cold pack for *** minutes to ***.    Patient Education and Home Exercises     Education provided:   - ***    Written Home Exercises Provided: {Blank single:66226::"yes","Patient instructed to cont prior HEP"}.  Exercises were reviewed and Rachel was able to demonstrate them prior to the end of the session.  Rachel demonstrated {Desc; good/fair/poor:40015} understanding of the education provided.     See EMR under {Blank single:97938::"Media","Patient Instructions"} for exercises provided {Blank single:78619::"9/18/2023","prior visit"}.    Assessment     Rachel is a 58 y.o. female referred to outpatient Physical Therapy with a medical diagnosis of ***. Patient presents with ***    Patient prognosis is {REHAB PROGNOSIS OHS:91412}.   Patient will benefit from skilled outpatient Physical Therapy to address the deficits stated above and in the chart below, provide patient /family education, and to maximize patientt's level of " "independence.     Plan of care discussed with patient: {YES:14569}  Patient's spiritual, cultural and educational needs considered and patient is agreeable to the plan of care and goals as stated below:     Anticipated Barriers for therapy: ***    Medical Necessity is demonstrated by the following  History  Co-morbidities and personal factors that may impact the plan of care [] LOW: no personal factors / co-morbidities  [] MODERATE: 1-2 personal factors / co-morbidities  [] HIGH: 3+ personal factors / co-morbidities    Moderate / High Support Documentation:   Co-morbidities affecting plan of care: ***    Personal Factors:   {Personal Factors:83003}     Examination  Body Structures and Functions, activity limitations and participation restrictions that may impact the plan of care [] LOW: addressing 1-2 elements  [] MODERATE: 3+ elements  [] HIGH: 4+ elements (please support below)    Moderate / High Support Documentation: ***     Clinical Presentation [] LOW: stable  [] MODERATE: Evolving  [] HIGH: Unstable     Decision Making/ Complexity Score: {Desc; low/moderate/high:669141}       Goals:  Short Term Goals:  1. Patient will be compliant with home exercise program to supplement therapy in decreasing pain with functional mobility.  2. Patient will improve MiniBest score by 4 points to improve functional mobility.  3. Patient will improve timed up and go score to </=***" to decrease fall risk in community.  4. Patient will improve 30" sit<>stand score to *** with bilateral upper extremity support to demonstrate increased functional strength.     Long Term Goals:   1. Patient will improve FOTO score to </= 46% limited to decrease perceived limitation with maintaining/changing body position  2. Patient will improve MiniBest score to >/= *** to decrease fall risk at home.  3. Patient will improve timed up and go score to </=***" to decrease fall risk in community.  4. Patient will improve 30" sit<>stand score to >/=*** " with minimal upper extremity support to demonstrate increased functional strength.   5. Patient will report 0 falls since *** .    Plan     Plan of care Certification: 9/18/2023 to ***.    Outpatient Physical Therapy {NUMBERS 1-5:49879} times weekly for {NUMBER 1-16:22407} weeks to include the following interventions: {TX PLAN:32433}.     Julissa Torres, PT

## 2023-09-18 NOTE — PLAN OF CARE
[facility]  Physical Therapy Neurological Rehabilitation Initial Evaluation     Name: [facility]  Clinic Number: [id number]    Therapy Diagnosis:   Encounter Diagnoses   Name Primary?    Parkinson disease     Balance disorder     Functional gait abnormality      Physician: Anu Gresham MD    Physician Orders: PT Eval and Treat   Medical Diagnosis from Referral: G20 (ICD-10-CM) - Parkinson disease   Evaluation Date: 9/18/2023  Authorization Period Expiration: 9/10/2024  Plan of Care Expiration: 12/31/2023  Progress Note Due: Visit 4   Visit # / Visits authorized: 1/ 1  FOTO: 1/3    Precautions: Standard and Fall    Time In: 1015  Time Out: 1100  Total Billable Time: 45 minutes    Subjective      Date of onset: diagnosed with parkinson's disease about 6 years ago     History of current condition - Rachel reports: she reports has issues with coordination more than anything. She states she has difficulty washing dishes at times. Her symptoms are more right sided. She finds she walks crooked, and her right foot appears to turn on her. She also finds it drags, she has not fallen but has had  close calls. She also reports difficulty sleeping and uses a recliner for sleeping.      Imaging, none:     Prior Therapy: she has [facility], week before she finishes up. She was being seen for her ankle.   Social History:  lives with their spouse, 2 dogs wants to return to walking them   Falls: no   DME:  none   Home Environment: 6 steps to enter her home with a rail    Exercise Routine / History: walking 2.5 mi/day walking. But hasn't done anything in the last year    Family Present at time of Eval: self only    Occupation: retired   Prior Level of Function: independent   Current Level of Function: increase time to perform most tasks, especially fine motor type tasks.     Pain:  Current 1/10, in bilateral ankles.     Patient's goals: to be walk her dogs, be able to get up/down off the floor to  "play with grandchildren,     Medical History:   Past Medical History:   Diagnosis Date    Cancer     breast stage iv, remission 2014    Fatty liver     Hereditary hemochromatosis 2015    History of TMJ syndrome     Hyperlipidemia     Hypertension     Hypoglycemia     Osteoarthritis     Parkinson disease     Torn meniscus        Surgical History:   Rachel Leal  has a past surgical history that includes Breast surgery; Eye surgery;  section; Hand surgery; Hysterectomy; Colonoscopy; Liver biopsy (2017); Tonsillectomy; Mastectomy (Left); Excision of condyloma (N/A, 2019); Fulguration of anal condyloma (N/A, 2019); and Foot surgery (Right, 06/15/2022).    Medications:   Rachel has a current medication list which includes the following prescription(s): amantadine hcl, anastrozole, atorvastatin, carbidopa-levodopa  mg, coconut oil, denosumab, diazepam, docusate sodium, esomeprazole, losartan-hydrochlorothiazide 50-12.5 mg, oxybutynin, oxybutynin, venlafaxine, and vitamin d.    Allergies:   Review of patient's allergies indicates:   Allergen Reactions    Neupro [rotigotine] Dermatitis        Objective        Gait  Speed:  decreased alex noted    Pattern: step through gait pattern, with decrease foot clearance bilaterally   Posture: forward head/rounded shoulders      Right Left Right Left    Strength Strength AROM/PROM AROM/PROM   Hip flexion 5/5 5/5     Extension 4/5 4/5     Abduction 4/5 4/5     Adduction 5/5 5/5     Knee ext 5/5 5/5     Knee flexion 5/5 5/5     DF 5/5 5/5     PF 5/55/5 5/5           Balance/Proprioception:  Single leg stance: R:  16 L:  14  Tandem stance: 20 sec     Sensation: light touch intact    Coordination:   Test Left Right Comment   Heel<>Shin 8" 8" Right lower extremity only travels about 50% range of motion,   Rapid Movements ** ** Difficulty maintaining speed after about 5" of movement      Special Tests:     TUG:  10.5"  30 Second Sit to Stand: 8 " with moderate upper extremity support   Mini BEST test: MINI-BEST TEST    Anticipatory    1. Sit to Stand  (2) Normal: Comes to stand without use of hands and stabilizes independently    2. Rise to Toes  (1) Moderate: Heels up, but not full range (smaller than when holding hands), OR noticeable instability for 3 seconds    3. Stand on One Leg (Use the side with the lowest score to calculate sub-score and total score)  (1) Moderate: < 20 seconds (Right Leg) and (1) Moderate: < 20 seconds (Left Leg)    Sub Score: 4/6    Reactive Postural Control    4. Compensatory Stepping Correction - Forward  (0) Severe: No step, OR would fall if not caught, OR falls spontaneously    5. Compensatory Stepping Correction - Backward  (0) Severe: No step, OR would fall if not caught, OR falls spontaneously    6. Compensatory Stepping Correction - Lateral (Use the side with the lowest score to calculate sub-score and total score)  (0) Severe: Falls, or cannot step (Right Leg) and (0) Severe: Falls, or cannot step (Left Leg)    Sub Score: 0/6    Sensory Orientation  7. Stance (Feet Together); Eyes Open, Firm Surface  (2) Normal: 30 sec    8. Stance (Feet Together); Eyes Closed; Foam Surface  (1) Moderate: < 30 sec    9. Incline - Eyes Closed  (2) Normal: Stands independently 30 sec and aligns with gravity    Sub Score: 5/6    Dynamic Gait  10. Change in Gait Speed  (1) Moderate: Unable to change walking speed or signs or imbalance    11. Walk with Head Turns - Horizontal  (1) Moderate: Performs head turns with reduction in gait speed    12. Walk with Pivot Turns  (1) Moderate: Turns with feet close SLOW (>/ 4 steps) with good balance    13. Step Over Obstacles  (1) Moderate: Steps over box but touches box OR displays cautious behavior by slowing gait    14. Timed Up & Go with Dual Task [3 meter walk]  TUG: 10.5 seconds; Dual Task TU,7 seconds  (1) Moderate: Dual Task affects either counting OR walking (>10%) when compared to the TUG  without Dual Task    Total Score: 14/28     CMS Impairment/Limitation/Restriction for FOTO Degenerative CNS Survey    Therapist reviewed FOTO scores for Rachel Leal on 9/18/2023.   FOTO documents entered into [employer] - see Media section.    Limitation Score: 57%       Treatment     Total Treatment time separate from Evaluation: 15 minutes    Rachel received the treatments listed below:      neuromuscular re-education activities to improve: Balance, Coordination, Kinesthetic, Sense, and Posture for 15 minutes. The following activities were included:  PWR up x 3 slow, x 3 fast  PWR rock x 6 on ea side alternating   PWR step x 6 on ea side alternating   PWR twist x 6 on ea side alternating       Patient Education and Home Exercises     Education provided:   - PT plan of care and goals for stay     Written Home Exercises Provided: yes.  Exercises were reviewed and Rachel was able to demonstrate them prior to the end of the session.  Rachel demonstrated good  understanding of the education provided.     See EMR under Patient Instructions for exercises provided 9/18/2023.    Assessment     Rachel is a 58 y.o. female referred to outpatient Physical Therapy with a medical diagnosis of G20 (ICD-10-CM) - Parkinson disease . Patient presents with impaired fall risk, impaired balance, rigidity in hips and spine, and decrease hand dexterity.    Patient prognosis is Good.   Patient will benefit from skilled outpatient Physical Therapy to address the deficits stated above and in the chart below, provide patient /family education, and to maximize patientt's level of independence.     Plan of care discussed with patient: Yes  Patient's spiritual, cultural and educational needs considered and patient is agreeable to the plan of care and goals as stated below:     Anticipated Barriers for therapy: progressive nature of disease     Medical Necessity is demonstrated by the following  History  Co-morbidities and personal factors that may  "impact the plan of care [] LOW: no personal factors / co-morbidities  [x] MODERATE: 1-2 personal factors / co-morbidities  [] HIGH: 3+ personal factors / co-morbidities    Moderate / High Support Documentation:   Co-morbidities affecting plan of care: n/a    Personal Factors:   lifestyle     Examination  Body Structures and Functions, activity limitations and participation restrictions that may impact the plan of care [] LOW: addressing 1-2 elements  [x] MODERATE: 3+ elements  [] HIGH: 4+ elements (please support below)    Moderate / High Support Documentation: motor control, balance, speed, transitions, ambulation      Clinical Presentation [] LOW: stable  [x] MODERATE: Evolving  [] HIGH: Unstable     Decision Making/ Complexity Score: moderate       Goals:  Short Term Goals:  1. Patient will be compliant with home exercise program to supplement therapy in decreasing pain with functional mobility.  2. Patient will improve MiniBest score by 4 points to improve functional mobility.  3. Patient will improve timed up and go score to </=10" to decrease fall risk in community.  4. Patient will improve 30" sit<>stand score to 10 with bilateral upper extremity support to demonstrate increased functional strength.     Long Term Goals:   1. Patient will improve FOTO score to </= 46% limited to decrease perceived limitation with maintaining/changing body position  2. Patient will improve MiniBest score to >/= 20 to decrease fall risk at home.  3. Patient will improve timed up and go score to </=9" to decrease fall risk in community.  4. Patient will improve 30" sit<>stand score to >/=12 with minimal upper extremity support to demonstrate increased functional strength.   5. Patient will report 0 falls since 9/18/2023 .    Plan     Plan of care Certification: 9/18/2023 to 12/31/2023.    Outpatient Physical Therapy 2 times weekly for 6 weeks to include the following interventions: Electrical Stimulation , Gait Training, Manual " Therapy, Neuromuscular Re-ed, Orthotic Management and Training, Patient Education, Self Care, Therapeutic Activities, and Therapeutic Exercise.     Julissa Torres, PT

## 2023-09-20 ENCOUNTER — CLINICAL SUPPORT (OUTPATIENT)
Dept: REHABILITATION | Facility: HOSPITAL | Age: 58
End: 2023-09-20
Attending: PSYCHIATRY & NEUROLOGY
Payer: COMMERCIAL

## 2023-09-20 DIAGNOSIS — G20.A1 PARKINSON DISEASE: ICD-10-CM

## 2023-09-20 DIAGNOSIS — R26.89 BALANCE DISORDER: Primary | ICD-10-CM

## 2023-09-20 DIAGNOSIS — R26.89 FUNCTIONAL GAIT ABNORMALITY: ICD-10-CM

## 2023-09-20 PROCEDURE — 97112 NEUROMUSCULAR REEDUCATION: CPT | Mod: PN

## 2023-09-20 PROCEDURE — 97530 THERAPEUTIC ACTIVITIES: CPT | Mod: PN

## 2023-09-25 ENCOUNTER — CLINICAL SUPPORT (OUTPATIENT)
Dept: REHABILITATION | Facility: HOSPITAL | Age: 58
End: 2023-09-25
Attending: PSYCHIATRY & NEUROLOGY
Payer: COMMERCIAL

## 2023-09-25 DIAGNOSIS — R26.89 BALANCE DISORDER: Primary | ICD-10-CM

## 2023-09-25 DIAGNOSIS — G20.A1 PARKINSON DISEASE: ICD-10-CM

## 2023-09-25 DIAGNOSIS — R26.89 FUNCTIONAL GAIT ABNORMALITY: ICD-10-CM

## 2023-09-25 PROCEDURE — 97112 NEUROMUSCULAR REEDUCATION: CPT | Mod: PN

## 2023-09-25 PROCEDURE — 97530 THERAPEUTIC ACTIVITIES: CPT | Mod: PN

## 2023-09-25 NOTE — PROGRESS NOTES
"[facility]   Physical Therapy Treatment Note      Name: [facility]  Clinic Number: [id number]    Therapy Diagnosis:   Encounter Diagnoses   Name Primary?    Balance disorder Yes    Parkinson disease     Functional gait abnormality      Physician: Anu Gresham MD    Visit Date: 9/25/2023    Physician Orders: PT Eval and Treat   Medical Diagnosis from Referral: G20 (ICD-10-CM) - Parkinson disease   Evaluation Date: 9/18/2023  Authorization Period Expiration: 12/31/2023  Plan of Care Expiration: 12/31/2023  Progress Note Due: Visit 4   Visit # / Visits authorized: 2/ 20  FOTO: 1/3     Precautions: Standard and Fall     Time In: 1150  Time Out: 1230  Total Billable Time: 40 minutes          Subjective     Patient reports: no new complaints . Stated she was able to get on the ground and play with her grandchild this weekend  She was compliant with home exercise program.  Response to previous treatment: -  Functional change: -    Pain: 0/10  Location: none     Objective      Objective Measures updated at progress report unless specified.     Treatment     Rachel received the treatments listed below:      therapeutic exercises to develop strength, endurance, ROM, flexibility, posture, and core stabilization for - minutes including:  -      neuromuscular re-education activities to improve: Balance, Coordination, Kinesthetic, Sense, Proprioception, and Posture for 25 minutes. The following activities were included:  PWR up with reach out x 5 slow, x 5 fast   PWR modified plantigrade twist x 10 slow, x 6 fast   PWR step with knee drive onto bench x 10 ea side to standing   PWR stepping onto 10" bench with twist x 10 ea side   PWR step up on 6" step 2 x 10 ea side with target to increase reach after each repetition forward, lateral x 10 on ea side   PWR in wide sitting with back reach/twist x 5 on ea side, wide sit to reach forward x 10 on ea side       therapeutic activities to improve " functional performance for 15  minutes, including:  Stand to kneeling x 5 on ea side   Bear crawl holds to lower 2 x 5   Walk ups plank to standing x 3   Intentional walking: fast/backwards 2 x 20' with cuing for larger backwards, walking with high elbows x 50' ea way, stepping over obstacle forward 2 x 10 ea direction     gait training to improve functional mobility and safety for -  minutes, including:  -      Patient Education and Home Exercises       Education provided:   - physical therapy plan of care and goals  - parkinson's related symptoms and rigidity     Written Home Exercises Provided: yes. Exercises were reviewed and Rachel was able to demonstrate them prior to the end of the session.  Rachel demonstrated good  understanding of the education provided. See Electronic Medical Record under Patient Instructions for exercises provided during therapy sessions. Added thread the needle on 9/20    Assessment     Rachel tolerated session well. She continues to be limited in axial rotation and rigidity in hips > other joints. She tolerates body weight activities well and noticed improved cervical range of motion on this date.    Rachel Is progressing well towards her goals.   Patient prognosis is Good.     Patient will continue to benefit from skilled outpatient physical therapy to address the deficits listed in the problem list box on initial evaluation, provide pt/family education and to maximize pt's level of independence in the home and community environment.     Patient's spiritual, cultural and educational needs considered and pt agreeable to plan of care and goals.     Anticipated barriers to physical therapy: progressive disease     Goals: Short Term Goals:  1. Patient will be compliant with home exercise program to supplement therapy in decreasing pain with functional mobility.  2. Patient will improve MiniBest score by 4 points to improve functional mobility.  3. Patient will improve timed up and go score to  "</=10" to decrease fall risk in community.  4. Patient will improve 30" sit<>stand score to 10 with bilateral upper extremity support to demonstrate increased functional strength.      Long Term Goals:   1. Patient will improve FOTO score to </= 46% limited to decrease perceived limitation with maintaining/changing body position  2. Patient will improve MiniBest score to >/= 20 to decrease fall risk at home.  3. Patient will improve timed up and go score to </=9" to decrease fall risk in community.  4. Patient will improve 30" sit<>stand score to >/=12 with minimal upper extremity support to demonstrate increased functional strength.   5. Patient will report 0 falls since 9/18/2023 .      Plan     Plan of care Certification: 9/18/2023 to 12/31/2023.     Outpatient Physical Therapy 2 times weekly for 6 weeks to include the following interventions: Electrical Stimulation , Gait Training, Manual Therapy, Neuromuscular Re-ed, Orthotic Management and Training, Patient Education, Self Care, Therapeutic Activities, and Therapeutic Exercise.        Julissa Torres, PT     "

## 2023-09-27 DIAGNOSIS — G20.A1 PARKINSON DISEASE: ICD-10-CM

## 2023-09-27 DIAGNOSIS — F41.9 ANXIETY: ICD-10-CM

## 2023-09-27 RX ORDER — DIAZEPAM 2 MG/1
4 TABLET ORAL NIGHTLY PRN
Qty: 60 TABLET | Refills: 0 | Status: SHIPPED | OUTPATIENT
Start: 2023-09-27 | End: 2024-03-26 | Stop reason: SDUPTHER

## 2023-09-27 NOTE — TELEPHONE ENCOUNTER
Care Due:                  Date            Visit Type   Department     Provider  --------------------------------------------------------------------------------                                EP -                              PRIMARY      [facility] INTERNAL  Last Visit: 01-      [facility]   MEDICINE II    Cata Leal  Next Visit: None Scheduled  None         None Found                                                            Last  Test          Frequency    Reason                     Performed    Due Date  --------------------------------------------------------------------------------    CMP.........  12 months..  atorvastatin,              10-   10-                             losartan-hydrochlorothiaz                             lakshmi......................    Lipid Panel.  12 months..  atorvastatin.............  10-   10-    Health [facility] Embedded Care Due Messages. Reference number: [id number].   9/27/2023 8:40:28 AM CDT

## 2023-10-04 ENCOUNTER — CLINICAL SUPPORT (OUTPATIENT)
Dept: REHABILITATION | Facility: HOSPITAL | Age: 58
End: 2023-10-04
Attending: PSYCHIATRY & NEUROLOGY
Payer: COMMERCIAL

## 2023-10-04 DIAGNOSIS — R26.89 BALANCE DISORDER: Primary | ICD-10-CM

## 2023-10-04 DIAGNOSIS — R26.89 FUNCTIONAL GAIT ABNORMALITY: ICD-10-CM

## 2023-10-04 DIAGNOSIS — G20.A2 PARKINSON'S DISEASE WITHOUT DYSKINESIA, WITH FLUCTUATING MANIFESTATIONS: ICD-10-CM

## 2023-10-04 PROCEDURE — 97112 NEUROMUSCULAR REEDUCATION: CPT | Mod: PN

## 2023-10-04 PROCEDURE — 97110 THERAPEUTIC EXERCISES: CPT | Mod: PN

## 2023-10-04 PROCEDURE — 97530 THERAPEUTIC ACTIVITIES: CPT | Mod: PN

## 2023-10-04 NOTE — PROGRESS NOTES
[facility]   Physical Therapy Treatment Note      Name: Rachel [facility] Number: [id number]    Therapy Diagnosis:   Encounter Diagnoses   Name Primary?    Balance disorder Yes    Parkinson's disease without dyskinesia, with fluctuating manifestations     Functional gait abnormality      Physician: Anu Gresham MD    Visit Date: 10/4/2023    Physician Orders: PT Eval and Treat   Medical Diagnosis from Referral: G20 (ICD-10-CM) - Parkinson disease   Evaluation Date: 9/18/2023  Authorization Period Expiration: 12/31/2023  Plan of Care Expiration: 12/31/2023  Progress Note Due: Visit 5  Visit # / Visits authorized: 3/ 20  FOTO: 1/3     Precautions: Standard and Fall     Time In: 1345  Time Out: 1430  Total Billable Time: 45 minutes      Subjective     Patient reports: no new complaints, continues to be improve with abiility to get up and down   She was compliant with home exercise program.  Response to previous treatment: -  Functional change: -    Pain: 0/10  Location: none     Objective      Objective Measures updated at progress report unless specified.     Treatment     Rachel received the treatments listed below:      therapeutic exercises to develop strength, endurance, ROM, flexibility, posture, and core stabilization for 10 minutes including:  Supine figure 4 for hip range of motion/strengthening   90/90 sitting, attempting to alternate positions for hip range of motion       neuromuscular re-education activities to improve: Balance, Coordination, Kinesthetic, Sense, Proprioception, and Posture for 25 minutes. The following activities were included:  PWR up with reach out x 5 slow, x 5 fast   PWR modified plantigrade twist x 10 slow, x 6 fast   PWR step with knee drive onto bench x 10 ea side to standing   PWR step to knee drive  with stand and rotation x 10 ea direction   PWR kneeling with step back and reach for hip range of motion/Strengthening x 10 ea side   PWR  "kneeling with side step squat for range of motion x 10 ea side.   PWR stepping onto 10" bench with twist x 10 ea side       therapeutic activities to improve functional performance for 10  minutes, including:  Stand to kneeling x 10 on ea side   Bear crawl holds to lower 2 x 5   Bear crawl to pike x 5   Walk ups plank to standing x 3       gait training to improve functional mobility and safety for -  minutes, including:  -      Patient Education and Home Exercises       Education provided:   - physical therapy plan of care and goals  - parkinson's related symptoms and rigidity     Written Home Exercises Provided: yes. Exercises were reviewed and Rachel was able to demonstrate them prior to the end of the session.  Rachel demonstrated good  understanding of the education provided. See Electronic Medical Record under Patient Instructions for exercises provided during therapy sessions. Added thread the needle on 9/20    Assessment     Rachel tolerated session well. She continues to be most restricted in left hip/spinal rigidity, but is tolerating larger amplitudes and able to hold positions for longer periods. She continues to be challenged most with floor to stand activities.     Rachel Is progressing well towards her goals.   Patient prognosis is Good.     Patient will continue to benefit from skilled outpatient physical therapy to address the deficits listed in the problem list box on initial evaluation, provide pt/family education and to maximize pt's level of independence in the home and community environment.     Patient's spiritual, cultural and educational needs considered and pt agreeable to plan of care and goals.     Anticipated barriers to physical therapy: progressive disease     Goals: Short Term Goals:  1. Patient will be compliant with home exercise program to supplement therapy in decreasing pain with functional mobility.  2. Patient will improve MiniBest score by 4 points to improve functional mobility.  3. " "Patient will improve timed up and go score to </=10" to decrease fall risk in community.  4. Patient will improve 30" sit<>stand score to 10 with bilateral upper extremity support to demonstrate increased functional strength.      Long Term Goals:   1. Patient will improve FOTO score to </= 46% limited to decrease perceived limitation with maintaining/changing body position  2. Patient will improve MiniBest score to >/= 20 to decrease fall risk at home.  3. Patient will improve timed up and go score to </=9" to decrease fall risk in community.  4. Patient will improve 30" sit<>stand score to >/=12 with minimal upper extremity support to demonstrate increased functional strength.   5. Patient will report 0 falls since 9/18/2023 .      Plan     Plan of care Certification: 9/18/2023 to 12/31/2023.     Outpatient Physical Therapy 2 times weekly for 6 weeks to include the following interventions: Electrical Stimulation , Gait Training, Manual Therapy, Neuromuscular Re-ed, Orthotic Management and Training, Patient Education, Self Care, Therapeutic Activities, and Therapeutic Exercise.        Julissa Torres, PT     "

## 2023-10-17 ENCOUNTER — DOCUMENTATION ONLY (OUTPATIENT)
Dept: REHABILITATION | Facility: HOSPITAL | Age: 58
End: 2023-10-17
Payer: COMMERCIAL

## 2023-10-17 DIAGNOSIS — E78.5 HYPERLIPIDEMIA, UNSPECIFIED HYPERLIPIDEMIA TYPE: ICD-10-CM

## 2023-10-17 NOTE — TELEPHONE ENCOUNTER
No care due was identified.  [facility] Embedded Care Due Messages. Reference number: [id number].   10/17/2023 12:30:54 PM CDT

## 2023-10-18 RX ORDER — ATORVASTATIN CALCIUM 40 MG/1
TABLET, FILM COATED ORAL
Qty: 90 TABLET | Refills: 0 | Status: SHIPPED | OUTPATIENT
Start: 2023-10-18 | End: 2024-01-17

## 2023-10-18 NOTE — TELEPHONE ENCOUNTER
Refill Decision Note   Rachel Leal  is requesting a refill authorization.  Brief Assessment and Rationale for Refill:  Approve     Medication Therapy Plan:         Comments:     Note composed:2:16 PM 10/18/2023

## 2023-11-02 DIAGNOSIS — R07.9 CHEST PAIN, UNSPECIFIED TYPE: ICD-10-CM

## 2023-11-02 NOTE — TELEPHONE ENCOUNTER
No care due was identified.  Health [facility] Embedded Care Due Messages. Reference number: [id number].   11/02/2023 12:16:47 AM CDT  
Refill Routing Note   Medication(s) are not appropriate for processing by [facility] for the following reason(s):      No active prescription written by provider    [facility] action(s):  Defer Care Due:  None identified            Appointments  past 12m or future 3m with PCP    Date Provider   Last Visit   1/18/2023 Cata Leal MD   Next Visit   Visit date not found Cata Leal MD   ED visits in past 90 days: 0        Note composed:1:34 PM 11/02/2023           
Strong peripheral pulses/Capillary refill less/equal to 2 seconds

## 2023-11-03 RX ORDER — ESOMEPRAZOLE MAGNESIUM 40 MG/1
40 CAPSULE, DELAYED RELEASE ORAL
Qty: 90 CAPSULE | Refills: 3 | Status: SHIPPED | OUTPATIENT
Start: 2023-11-03

## 2023-11-22 DIAGNOSIS — I10 BENIGN ESSENTIAL HTN: ICD-10-CM

## 2023-12-11 ENCOUNTER — TELEPHONE (OUTPATIENT)
Dept: NEUROLOGY | Facility: CLINIC | Age: 58
End: 2023-12-11
Payer: COMMERCIAL

## 2023-12-11 NOTE — TELEPHONE ENCOUNTER
----- Message from Malcolm HINOJOSA Route sent at 2023  9:04 AM CST -----  Regardin Month Appointment  Contact: pt.  [phone]  Pt is calling in ref to scheduling her 6 month appt. She says she was told she needed to see provider in person for this visit. Pt is asking for something soon she is past her 6 months. Patient Requesting Call Back @  [phone]

## 2023-12-20 DIAGNOSIS — G20.A1 PARKINSON DISEASE: ICD-10-CM

## 2023-12-20 RX ORDER — AMANTADINE HYDROCHLORIDE 100 MG/1
CAPSULE, GELATIN COATED ORAL
Qty: 270 CAPSULE | Refills: 0 | Status: SHIPPED | OUTPATIENT
Start: 2023-12-20 | End: 2024-03-04

## 2023-12-24 ENCOUNTER — PATIENT MESSAGE (OUTPATIENT)
Dept: ADMINISTRATIVE | Facility: OTHER | Age: 58
End: 2023-12-24
Payer: COMMERCIAL

## 2023-12-26 NOTE — TELEPHONE ENCOUNTER
Care Due:                  Date            Visit Type   Department     Provider  --------------------------------------------------------------------------------                                EP -                              PRIMARY      [facility] INTER[facility]  Last Visit: 01-      [facility]   MEDICINE II    Cata Leal  Next Visit: None Scheduled  None         None Found                                                            Last  Test          Frequency    Reason                     Performed    Due Date  --------------------------------------------------------------------------------    CMP.........  12 months..  atorvastatin,              10-   10-                             losartan-hydrochlorothiaz                             lakshmi......................    Lipid Panel.  12 months..  atorvastatin.............  10-   10-    Health [facility] Embedded Care Due Messages. Reference number: [id number].   12/26/2023 12:11:51 AM CST

## 2023-12-27 RX ORDER — OXYBUTYNIN CHLORIDE 5 MG/1
TABLET ORAL
Qty: 90 TABLET | Refills: 1 | Status: SHIPPED | OUTPATIENT
Start: 2023-12-27 | End: 2024-03-26

## 2024-01-17 DIAGNOSIS — E78.5 HYPERLIPIDEMIA, UNSPECIFIED HYPERLIPIDEMIA TYPE: ICD-10-CM

## 2024-01-17 RX ORDER — ATORVASTATIN CALCIUM 40 MG/1
40 TABLET, FILM COATED ORAL DAILY
Qty: 90 TABLET | Refills: 0 | Status: SHIPPED | OUTPATIENT
Start: 2024-01-17 | End: 2024-05-29

## 2024-01-17 NOTE — TELEPHONE ENCOUNTER
Care Due:                  Date            Visit Type   Department     Provider  --------------------------------------------------------------------------------                                EP -                              PRIMARY      [facility] INTER[facility]  Last Visit: 01-      [facility] ([facility])   MEDICINE EDER Leal  Next Visit: None Scheduled  None         None Found                                                            Last  Test          Frequency    Reason                     Performed    Due Date  --------------------------------------------------------------------------------    Office Visit  15 months..  atorvastatin,              01- 04-                             esomeprazole,                             losartan-hydrochlorothiaz                             lakshmi......................    [employer] Embedded Care Due Messages. Reference number: [id number].   1/17/2024 12:13:58 AM CST

## 2024-01-17 NOTE — TELEPHONE ENCOUNTER
Refill Routing Note   Medication(s) are not appropriate for processing by [facility] for the following reason(s):        Required labs outdated: CMP, Lipid panel    [facility] action(s):  Defer   Requires appointment : Yes     Requires labs : Yes    Medication Therapy Plan:  Due for annual with PCP, labs      Appointments  past 12m or future 3m with PCP    Date Provider   Last Visit   1/18/2023 Cata Leal MD   Next Visit   Visit date not found Cata Leal MD   ED visits in past 90 days: 0        Note composed:2:50 PM 01/17/2024

## 2024-01-23 ENCOUNTER — PATIENT OUTREACH (OUTPATIENT)
Dept: INTERNAL MEDICINE | Facility: CLINIC | Age: 59
End: 2024-01-23
Payer: COMMERCIAL

## 2024-01-23 NOTE — TELEPHONE ENCOUNTER
Contacted pt regarding HTN digital med program.  Pt states that she would decline participation in program due to have difficulty handling cuff since she has Parkinson's Disease.  Informed her that she can keep monitor and let us know if she would like to try again in future.

## 2024-01-30 NOTE — TELEPHONE ENCOUNTER
[facility] Call Center    Phone Message    May a detailed message be left on voicemail: yes     Reason for Call: Order(s): Other:   Reason for requested: MRA  Date needed: ASAP  Provider name: Malik Del Cid  Patient requests MRA at [facility] and call patient when order has been placed.    Action Taken: [facility]    Travel Screening: Not Applicable                                                                   Patient calling today to cancel scheduled appointment for 4/20/2020. She states that she will continue current therapy and follow up in July as scheduled.

## 2024-02-02 ENCOUNTER — HOSPITAL ENCOUNTER (EMERGENCY)
Facility: HOSPITAL | Age: 59
Discharge: HOME | End: 2024-02-02
Attending: STUDENT IN AN ORGANIZED HEALTH CARE EDUCATION/TRAINING PROGRAM
Payer: COMMERCIAL

## 2024-02-02 VITALS
BODY MASS INDEX: 33.51 KG/M2 | DIASTOLIC BLOOD PRESSURE: 87 MMHG | SYSTOLIC BLOOD PRESSURE: 142 MMHG | HEART RATE: 83 BPM | TEMPERATURE: 98 F | OXYGEN SATURATION: 95 % | RESPIRATION RATE: 16 BRPM | WEIGHT: 183.19 LBS
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
## 2024-02-02 DIAGNOSIS — M25.522 ELBOW PAIN, LEFT: ICD-10-CM

## 2024-02-02 DIAGNOSIS — S62.235A CLOSED NONDISPLACED FRACTURE OF BASE OF FIRST METACARPAL BONE OF LEFT HAND, UNSPECIFIED FRACTURE MORPHOLOGY, INITIAL ENCOUNTER: Primary | ICD-10-CM

## 2024-02-02 PROCEDURE — 29125 APPL SHORT ARM SPLINT STATIC: CPT | Mod: LT

## 2024-02-02 PROCEDURE — 99283 EMERGENCY DEPT VISIT LOW MDM: CPT | Mod: 25
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
## 2024-02-26 ENCOUNTER — TELEPHONE (OUTPATIENT)
Dept: INTERNAL MEDICINE | Facility: CLINIC | Age: 59
End: 2024-02-26
Payer: COMMERCIAL

## 2024-02-26 DIAGNOSIS — I10 BENIGN ESSENTIAL HTN: Primary | ICD-10-CM

## 2024-02-26 DIAGNOSIS — E78.5 HYPERLIPIDEMIA, UNSPECIFIED HYPERLIPIDEMIA TYPE: ICD-10-CM

## 2024-02-26 DIAGNOSIS — E83.110 HEREDITARY HEMOCHROMATOSIS: ICD-10-CM

## 2024-02-26 DIAGNOSIS — E66.9 OBESITY (BMI 30-39.9): ICD-10-CM

## 2024-02-26 RX ORDER — LOSARTAN POTASSIUM AND HYDROCHLOROTHIAZIDE 12.5; 5 MG/1; MG/1
TABLET ORAL
Qty: 90 TABLET | Refills: 0 | Status: SHIPPED | OUTPATIENT
Start: 2024-02-26 | End: 2024-05-29

## 2024-02-26 NOTE — TELEPHONE ENCOUNTER
----- Message from Kamala Delgadillo sent at 2024 11:21 AM CST -----  Contact: pt  Rachel Leal  MRN: [id number]  : 1965  PCP: Cata Leal  Home Phone      [phone]  Work Phone      Not on file.  Mobile          [phone]  Home Phone      [phone]      MESSAGE: pt scheduled an appt and would like lab orders to be put in so she can have her blood work drawn the week before

## 2024-02-26 NOTE — TELEPHONE ENCOUNTER
No care due was identified.  [facility] Embedded Care Due Messages. Reference number: [id number].   2/26/2024 12:32:38 PM CST

## 2024-02-26 NOTE — TELEPHONE ENCOUNTER
Can you order patient's routine labs. I see she gets iron studies as well and its been over a year.

## 2024-03-02 DIAGNOSIS — G20.A1 PARKINSON DISEASE: ICD-10-CM

## 2024-03-04 RX ORDER — AMANTADINE HYDROCHLORIDE 100 MG/1
CAPSULE, GELATIN COATED ORAL
Qty: 90 CAPSULE | Refills: 0 | Status: SHIPPED | OUTPATIENT
Start: 2024-03-04 | End: 2024-03-21

## 2024-03-12 ENCOUNTER — LAB VISIT (OUTPATIENT)
Dept: LAB | Facility: HOSPITAL | Age: 59
End: 2024-03-12
Attending: PSYCHIATRY & NEUROLOGY
Payer: COMMERCIAL

## 2024-03-12 ENCOUNTER — PATIENT MESSAGE (OUTPATIENT)
Dept: NEUROLOGY | Facility: CLINIC | Age: 59
End: 2024-03-12

## 2024-03-12 ENCOUNTER — OFFICE VISIT (OUTPATIENT)
Dept: NEUROLOGY | Facility: CLINIC | Age: 59
End: 2024-03-12
Payer: COMMERCIAL

## 2024-03-12 VITALS
HEART RATE: 76 BPM | SYSTOLIC BLOOD PRESSURE: 131 MMHG | DIASTOLIC BLOOD PRESSURE: 87 MMHG | WEIGHT: 180.56 LBS | BODY MASS INDEX: 33.02 KG/M2

## 2024-03-12 DIAGNOSIS — G62.9 NEUROPATHY: ICD-10-CM

## 2024-03-12 DIAGNOSIS — G20.A2 PARKINSON'S DISEASE WITHOUT DYSKINESIA, WITH FLUCTUATING MANIFESTATIONS: ICD-10-CM

## 2024-03-12 DIAGNOSIS — G62.9 NEUROPATHY: Primary | ICD-10-CM

## 2024-03-12 LAB — FOLATE SERPL-MCNC: 15.2 NG/ML (ref 4–24)

## 2024-03-12 PROCEDURE — 84425 ASSAY OF VITAMIN B-1: CPT | Performed by: PSYCHIATRY & NEUROLOGY

## 2024-03-12 PROCEDURE — 84207 ASSAY OF VITAMIN B-6: CPT | Performed by: PSYCHIATRY & NEUROLOGY

## 2024-03-12 PROCEDURE — 99215 OFFICE O/P EST HI 40 MIN: CPT | Mod: S$GLB,,, | Performed by: PSYCHIATRY & NEUROLOGY

## 2024-03-12 PROCEDURE — 82746 ASSAY OF FOLIC ACID SERUM: CPT | Performed by: PSYCHIATRY & NEUROLOGY

## 2024-03-12 PROCEDURE — 36415 COLL VENOUS BLD VENIPUNCTURE: CPT | Performed by: PSYCHIATRY & NEUROLOGY

## 2024-03-12 PROCEDURE — 83921 ORGANIC ACID SINGLE QUANT: CPT | Performed by: PSYCHIATRY & NEUROLOGY

## 2024-03-12 PROCEDURE — 82607 VITAMIN B-12: CPT | Performed by: PSYCHIATRY & NEUROLOGY

## 2024-03-12 PROCEDURE — 99999 PR PBB SHADOW E&M-EST. PATIENT-LVL II: CPT | Mod: PBBFAC,,, | Performed by: PSYCHIATRY & NEUROLOGY

## 2024-03-12 PROCEDURE — 1159F MED LIST DOCD IN RCRD: CPT | Mod: CPTII,S$GLB,, | Performed by: PSYCHIATRY & NEUROLOGY

## 2024-03-12 RX ORDER — LEVODOPA AND CARBIDOPA 145; 36.25 MG/1; MG/1
3 CAPSULE, EXTENDED RELEASE ORAL NIGHTLY
Qty: 90 CAPSULE | Refills: 12 | Status: SHIPPED | OUTPATIENT
Start: 2024-03-12

## 2024-03-12 RX ORDER — GABAPENTIN 100 MG/1
100 CAPSULE ORAL 3 TIMES DAILY
Qty: 90 CAPSULE | Refills: 11 | Status: SHIPPED | OUTPATIENT
Start: 2024-03-12 | End: 2024-03-26

## 2024-03-12 NOTE — PROGRESS NOTES
"[facility]    PCP/Referring Provider: No referring provider defined for this encounter.  Date of Service: 3/12/2024    Chief Complaint: PD    Interval Hx  Since last visit,   Started carbidopa/levodopa 25/100mg 2 tabs Q4 hours  ONtime is 3.5 hours  Offtime- tremors and slowness  Continues amantadine 100mg PO TID  No hallucinations but she is concerned to oversode Ldopa as her mom has halluncinations    Feet burning consistently and affect walking    Time is 4 hours after which she drags a foot and mild tremors  She is more concerned with tremors  Having night-time offs- cannot sleep    No dizziness, nausea, halluciantions  Walking better  Hand dexterity is better    No dyskinesias    Falls: none  Assist Device: none  Can walk 300 feet before she tires    Mother on hospice for similar dz    Med HX  Started ropinirole 0.25mg PO TID and felt better but at 0.5mg PO TID felt worse, was feeling weak and stopped    "PriorHPI: Rachel Leal is a R HANDED 58 y.o. female with a medical issues significant for hemochromatosis, HTN, HL, Breast Cancer s/p chemotherapy, who presents with PDism. Notes her PDism began at age 53. R sided rigidity and arm stiffness began and progressed. "Just like mom." At this point has a moderate R hand resting tremor and     Started carbidopa/levodopa 25/100mg 1 tab daily 2017  Currently on Amantadine 100mg Po TID. This helps her tremor. 8/2/PM  No cognitive issues or leg swelling.  Has 4 hour ONtimes with Amantadine  When she was on both carbidopa/levodopa and amantadine she "felt worse"    R foot inversions when OFF  No orthostasis  No gambling tesnencies    [place] descent  Mother and grandmother had PDism"    Review of Systems:   Review of Systems   Constitutional: Negative for fever.   HENT: Negative for congestion.    Eyes: Negative for double vision.   Respiratory: Negative for cough and shortness of breath.    Cardiovascular: Negative for chest pain and leg swelling. "   Gastrointestinal: Negative for nausea.   Genitourinary: Negative for dysuria.   Musculoskeletal: Negative for falls.   Skin: Negative for rash.   Neurological: Positive for tremors and speech change. Negative for headaches.   Psychiatric/Behavioral: Negative for depression.         Current Medications:  Outpatient Encounter Medications as of 3/12/2024   Medication Sig Dispense Refill    amantadine HCL (SYMMETREL) 100 mg capsule TAKE 1 CAPSULE (100 MG TOTAL) BY MOUTH 3 TIMES A DAY 90 capsule 0    anastrozole (ARIMIDEX) 1 mg Tab Take 1 mg by mouth once daily.      atorvastatin (LIPITOR) 40 MG tablet Take 1 tablet (40 mg total) by mouth once daily. Due for annual with PCP, labs 90 tablet 0    carbidopa-levodopa  mg (SINEMET)  mg per tablet Take 2 tablets by mouth 4 (four) times daily. 540 tablet 12    denosumab (XGEVA) 120 mg/1.7 mL (70 mg/mL) Soln Inject 120 mg into the skin. Every 3 months      docusate sodium (STOOL SOFTENER ORAL) Take by mouth.      esomeprazole (NEXIUM) 40 MG capsule TAKE 1 CAPSULE BY MOUTH BEFORE BREAKFAST. 90 capsule 3    losartan-hydrochlorothiazide 50-12.5 mg (HYZAAR) 50-12.5 mg per tablet TAKE 1 TABLET BY MOUTH EVERY DAY 90 tablet 0    oxybutynin (DITROPAN) 5 MG Tab TAKE 1 TABLET BY MOUTH EVERY DAY 90 tablet 1    venlafaxine (EFFEXOR-XR) 75 MG 24 hr capsule Take 75 mg by mouth once daily.       vitamin D 1000 units Tab Take 1,000 mg by mouth once daily.       carbidopa-levodopa (RYTARY) 36. mg CpSR Take 3 Capfuls by mouth every evening. 90 capsule 12    COCONUT OIL ORAL Take 2 capsules by mouth once daily.      diazePAM (VALIUM) 2 MG tablet TAKE 2 TABLETS (4 MG TOTAL) BY MOUTH NIGHTLY AS NEEDED FOR INSOMNIA. 60 tablet 0    gabapentin (NEURONTIN) 100 MG capsule Take 1 capsule (100 mg total) by mouth 3 (three) times daily. 90 capsule 11    oxybutynin (DITROPAN-XL) 5 MG TR24 Take 5 mg by mouth once daily.      [DISCONTINUED] amantadine HCL (SYMMETREL) 100 mg capsule TAKE 1  CAPSULE (100 MG TOTAL) BY MOUTH THREE TIMES DAILY. 270 capsule 0    [DISCONTINUED] losartan-hydrochlorothiazide 50-12.5 mg (HYZAAR) 50-12.5 mg per tablet TAKE 1 TABLET BY MOUTH EVERY DAY 90 tablet 0     No facility-administered encounter medications on file as of 3/12/2024.       Past Medical History:  Patient Active Problem List   Diagnosis    Hyperlipidemia    Hx of breast cancer    Neuropathy    Hereditary hemochromatosis    Anxiety    Parkinson disease    Fatty liver    Obesity (BMI 30-39.9)    Condyloma    Benign essential HTN    Right leg numbness    S/P mastectomy    History of cancer metastatic to bone    Balance disorder    Functional gait abnormality       Past Surgical History:  Past Surgical History:   Procedure Laterality Date    BREAST SURGERY      augmentation     SECTION      COLONOSCOPY      EXCISION OF CONDYLOMA N/A 2019    Procedure: EXCISION OF ANAL CANAL CONDYLOMA;  Surgeon: Yunior Starks MD;  Location: [facility] OR;  Service: Colon and Rectal;  Laterality: N/A;    EYE SURGERY      FOOT SURGERY Right 06/15/2022    FULGURATION OF ANAL CONDYLOMA N/A 2019    Procedure: FULGURATION OF ANAL CANAL CONDYLOMA;  Surgeon: Yunior Starks MD;  Location: [facility] OR;  Service: Colon and Rectal;  Laterality: N/A;    HAND SURGERY      HYSTERECTOMY      TLH BSO    LIVER BIOPSY  2017    Mild steatosis, macrovesicular 20% and microvesicular 10%. Mild to moderate siderosis, 2+, within hepatocytes. No fibrosis    MASTECTOMY Left     TONSILLECTOMY         Current Living Situation: home    Social:  Social History     Socioeconomic History    Marital status:    Tobacco Use    Smoking status: Former     Current packs/day: 0.00     Types: Cigarettes     Quit date: 2013     Years since quitting: 10.5    Smokeless tobacco: Never   Substance and Sexual Activity    Alcohol use: Yes     Comment: drinking 6 peers per month, no daily use     Drug use: No    Sexual activity: Yes     Partners: Male      Birth control/protection: Surgical     Comment: .       Family History:  Family History   Problem Relation Age of Onset    Colon cancer Mother     Diabetes Father     Heart disease Father     Hemochromatosis Brother     Cirrhosis Brother         hemochromotosis    Breast cancer Neg Hx     Ovarian cancer Neg Hx        PHYSICAL:  LMP 01/17/2012     Physical Exam  Constitutional: Well-developed, well-nourished, appears stated age  Eyes: No scleral icterus  ENT: Moist oral mucosa  Cardiovascular: No lower extremity edema   Respiratory: No labored breathing   Skin: No rash   Hematologic: No bruising    Other: GI/ deferred   Mental status: Alert and oriented to person, place, time, and situation;   follows commands  Speech: normal (not dysarthric), no aphasia  Cranial nerves:            CN II: Pupils mid-position and equal, not tested light or accommodation  CN III, IV, VI: Extraocular movements full, no nystagmus visualized  CN V: Not tested   CN VII: Face strong and symmetric bilaterally   CN VIII: Hearing intact to voice and conversation   CN IX, X: Palate raises midline and symmetric   CN XI: Strong shoulder shrug B/L  CN XII: Tongue appears midline   Motor: Normal bulk by appearance, no drift   Sensory: Not tested    Gait: fairly normal (out of proportion) to arms)  Deep tendon reflexes: Not tested  Movement/Coordination                    Mod hypophonic speech.                     Mod facial masking..                   No tremor with rest, posture, kinesis, or intention.   FNF NL  R hand and R foot dystonia mild    L hand tremor mild    ? Finger taps Finger flicks RAE Heel taps   Left 2+ 2+ - 2+   Right 3+ 3+ - 2+     Laboratory Data:  NA    Imaging:  Reported as NL    Assessment//Plan:   Problem List Items Addressed This Visit          Neuro    Neuropathy - Primary    Current Assessment & Plan     Burning foot pain affecting balance  F./u neuropathy labs  Can try gabapentin up to 300gm QHS          Relevant Orders    Vitamin B1    Vitamin B12 Deficiency Panel    FOLATE    Vitamin B6    Parkinson disease    Current Assessment & Plan     Parkinsonism, familial autosomal dominant. Mother has prominent BG calcifications - possible Fahr's dz. Rachel is under-dosed at them moment.   Due to OFFtime suggested  Continues carbidopa/levodopa 25/100mg 2 tab PO TID to 4x daily    Continue Amantadine 100mg PO TID    Suggested switch night time carbidopa/levodopa to 3 caps rytary 145mg for better ONtime              Anu Gresham MD, MS [facility]  Department of Neurology  Movement Disorders

## 2024-03-12 NOTE — ASSESSMENT & PLAN NOTE
Parkinsonism, familial autosomal dominant. Mother has prominent BG calcifications - possible Fahr's dz. Rachel is under-dosed at them moment.   Due to OFFtime suggested  Continues carbidopa/levodopa 25/100mg 2 tab PO TID to 4x daily    Continue Amantadine 100mg PO TID    Suggested switch night time carbidopa/levodopa to 3 caps rytary 145mg for better ONtime

## 2024-03-13 ENCOUNTER — TELEPHONE (OUTPATIENT)
Dept: NEUROLOGY | Facility: CLINIC | Age: 59
End: 2024-03-13
Payer: COMMERCIAL

## 2024-03-13 DIAGNOSIS — R20.0 RIGHT LEG NUMBNESS: Primary | ICD-10-CM

## 2024-03-13 NOTE — TELEPHONE ENCOUNTER
----- Message from Anu Gresham MD sent at 3/13/2024  3:04 PM CDT -----  Regarding: MRI  Please schedule a [facility] MRI

## 2024-03-13 NOTE — TELEPHONE ENCOUNTER
Reached out to pt to assist in scheduling MRI, Pt informed me to call back tomorrow when she has time. I set a reminder to call pt back tomorrow before end of day.

## 2024-03-14 LAB — VIT B12 SERPL-MCNC: 232 NG/L (ref 180–914)

## 2024-03-18 LAB
PYRIDOXAL SERPL-MCNC: 6 UG/L (ref 5–50)
VIT B1 BLD-MCNC: 72 UG/L (ref 38–122)

## 2024-03-20 DIAGNOSIS — G20.A1 PARKINSON DISEASE: ICD-10-CM

## 2024-03-21 RX ORDER — AMANTADINE HYDROCHLORIDE 100 MG/1
100 CAPSULE, GELATIN COATED ORAL 3 TIMES DAILY
Qty: 90 CAPSULE | Refills: 0 | Status: SHIPPED | OUTPATIENT
Start: 2024-03-21 | End: 2024-04-22

## 2024-03-23 ENCOUNTER — LAB VISIT (OUTPATIENT)
Dept: LAB | Facility: HOSPITAL | Age: 59
End: 2024-03-23
Attending: INTERNAL MEDICINE
Payer: COMMERCIAL

## 2024-03-23 DIAGNOSIS — E83.110 HEREDITARY HEMOCHROMATOSIS: ICD-10-CM

## 2024-03-23 DIAGNOSIS — I10 BENIGN ESSENTIAL HTN: ICD-10-CM

## 2024-03-23 DIAGNOSIS — E78.5 HYPERLIPIDEMIA, UNSPECIFIED HYPERLIPIDEMIA TYPE: ICD-10-CM

## 2024-03-23 DIAGNOSIS — E66.9 OBESITY (BMI 30-39.9): ICD-10-CM

## 2024-03-23 LAB
ALBUMIN SERPL BCP-MCNC: 4.1 G/DL (ref 3.5–5.2)
ALP SERPL-CCNC: 72 U/L (ref 55–135)
ALT SERPL W/O P-5'-P-CCNC: 5 U/L (ref 10–44)
ANION GAP SERPL CALC-SCNC: 10 MMOL/L (ref 8–16)
AST SERPL-CCNC: 13 U/L (ref 10–40)
BASOPHILS # BLD AUTO: 0.03 K/UL (ref 0–0.2)
BASOPHILS NFR BLD: 0.6 % (ref 0–1.9)
BILIRUB SERPL-MCNC: 0.5 MG/DL (ref 0.1–1)
BUN SERPL-MCNC: 21 MG/DL (ref 8–23)
CALCIUM SERPL-MCNC: 9.3 MG/DL (ref 8.7–10.5)
CHLORIDE SERPL-SCNC: 104 MMOL/L (ref 95–110)
CHOLEST SERPL-MCNC: 173 MG/DL (ref 120–199)
CHOLEST/HDLC SERPL: 3.2 {RATIO} (ref 2–5)
CO2 SERPL-SCNC: 27 MMOL/L (ref 23–29)
CREAT SERPL-MCNC: 0.8 MG/DL (ref 0.5–1.4)
DIFFERENTIAL METHOD BLD: ABNORMAL
EOSINOPHIL # BLD AUTO: 0.1 K/UL (ref 0–0.5)
EOSINOPHIL NFR BLD: 1.7 % (ref 0–8)
ERYTHROCYTE [DISTWIDTH] IN BLOOD BY AUTOMATED COUNT: 11.7 % (ref 11.5–14.5)
EST. GFR  (NO RACE VARIABLE): >60 ML/MIN/1.73 M^2
ESTIMATED AVG GLUCOSE: 108 MG/DL (ref 68–131)
FERRITIN SERPL-MCNC: 90 NG/ML (ref 20–300)
GLUCOSE SERPL-MCNC: 98 MG/DL (ref 70–110)
HBA1C MFR BLD: 5.4 % (ref 4–5.6)
HCT VFR BLD AUTO: 40.9 % (ref 37–48.5)
HDLC SERPL-MCNC: 54 MG/DL (ref 40–75)
HDLC SERPL: 31.2 % (ref 20–50)
HGB BLD-MCNC: 13.9 G/DL (ref 12–16)
IMM GRANULOCYTES # BLD AUTO: 0.03 K/UL (ref 0–0.04)
IMM GRANULOCYTES NFR BLD AUTO: 0.6 % (ref 0–0.5)
IRON SERPL-MCNC: 160 UG/DL (ref 30–160)
LDLC SERPL CALC-MCNC: 97.2 MG/DL (ref 63–159)
LYMPHOCYTES # BLD AUTO: 0.9 K/UL (ref 1–4.8)
LYMPHOCYTES NFR BLD: 17.8 % (ref 18–48)
MCH RBC QN AUTO: 31.9 PG (ref 27–31)
MCHC RBC AUTO-ENTMCNC: 34 G/DL (ref 32–36)
MCV RBC AUTO: 94 FL (ref 82–98)
MONOCYTES # BLD AUTO: 0.5 K/UL (ref 0.3–1)
MONOCYTES NFR BLD: 10.3 % (ref 4–15)
NEUTROPHILS # BLD AUTO: 3.6 K/UL (ref 1.8–7.7)
NEUTROPHILS NFR BLD: 69 % (ref 38–73)
NONHDLC SERPL-MCNC: 119 MG/DL
NRBC BLD-RTO: 0 /100 WBC
PLATELET # BLD AUTO: 181 K/UL (ref 150–450)
PMV BLD AUTO: 11.2 FL (ref 9.2–12.9)
POTASSIUM SERPL-SCNC: 4 MMOL/L (ref 3.5–5.1)
PROT SERPL-MCNC: 7.2 G/DL (ref 6–8.4)
RBC # BLD AUTO: 4.36 M/UL (ref 4–5.4)
SATURATED IRON: 61 % (ref 20–50)
SODIUM SERPL-SCNC: 141 MMOL/L (ref 136–145)
TOTAL IRON BINDING CAPACITY: 263 UG/DL (ref 250–450)
TRANSFERRIN SERPL-MCNC: 178 MG/DL (ref 200–375)
TRIGL SERPL-MCNC: 109 MG/DL (ref 30–150)
TSH SERPL DL<=0.005 MIU/L-ACNC: 2.33 UIU/ML (ref 0.4–4)
WBC # BLD AUTO: 5.23 K/UL (ref 3.9–12.7)

## 2024-03-23 PROCEDURE — 80053 COMPREHEN METABOLIC PANEL: CPT | Performed by: INTERNAL MEDICINE

## 2024-03-23 PROCEDURE — 84443 ASSAY THYROID STIM HORMONE: CPT | Performed by: INTERNAL MEDICINE

## 2024-03-23 PROCEDURE — 82728 ASSAY OF FERRITIN: CPT | Performed by: INTERNAL MEDICINE

## 2024-03-23 PROCEDURE — 36415 COLL VENOUS BLD VENIPUNCTURE: CPT | Performed by: INTERNAL MEDICINE

## 2024-03-23 PROCEDURE — 83540 ASSAY OF IRON: CPT | Performed by: INTERNAL MEDICINE

## 2024-03-23 PROCEDURE — 80061 LIPID PANEL: CPT | Performed by: INTERNAL MEDICINE

## 2024-03-23 PROCEDURE — 85025 COMPLETE CBC W/AUTO DIFF WBC: CPT | Performed by: INTERNAL MEDICINE

## 2024-03-23 PROCEDURE — 83036 HEMOGLOBIN GLYCOSYLATED A1C: CPT | Performed by: INTERNAL MEDICINE

## 2024-03-26 ENCOUNTER — OFFICE VISIT (OUTPATIENT)
Dept: INTERNAL MEDICINE | Facility: CLINIC | Age: 59
End: 2024-03-26
Payer: COMMERCIAL

## 2024-03-26 VITALS
SYSTOLIC BLOOD PRESSURE: 108 MMHG | HEIGHT: 62 IN | HEART RATE: 86 BPM | BODY MASS INDEX: 33.84 KG/M2 | RESPIRATION RATE: 16 BRPM | DIASTOLIC BLOOD PRESSURE: 72 MMHG | WEIGHT: 183.88 LBS | OXYGEN SATURATION: 95 %

## 2024-03-26 DIAGNOSIS — F41.9 ANXIETY: ICD-10-CM

## 2024-03-26 DIAGNOSIS — C79.51 SECONDARY MALIGNANT NEOPLASM OF BONE: ICD-10-CM

## 2024-03-26 DIAGNOSIS — Z85.3 HISTORY OF BREAST CANCER: ICD-10-CM

## 2024-03-26 DIAGNOSIS — K76.0 FATTY LIVER: ICD-10-CM

## 2024-03-26 DIAGNOSIS — G20.A1 PARKINSON'S DISEASE, UNSPECIFIED WHETHER DYSKINESIA PRESENT, UNSPECIFIED WHETHER MANIFESTATIONS FLUCTUATE: ICD-10-CM

## 2024-03-26 DIAGNOSIS — G62.9 NEUROPATHY: ICD-10-CM

## 2024-03-26 DIAGNOSIS — I10 BENIGN ESSENTIAL HTN: Primary | ICD-10-CM

## 2024-03-26 DIAGNOSIS — E78.5 HYPERLIPIDEMIA, UNSPECIFIED HYPERLIPIDEMIA TYPE: ICD-10-CM

## 2024-03-26 DIAGNOSIS — E66.9 OBESITY (BMI 30-39.9): ICD-10-CM

## 2024-03-26 DIAGNOSIS — E83.110 HEREDITARY HEMOCHROMATOSIS: ICD-10-CM

## 2024-03-26 PROCEDURE — 99999 PR PBB SHADOW E&M-EST. PATIENT-LVL IV: CPT | Mod: PBBFAC,,, | Performed by: INTERNAL MEDICINE

## 2024-03-26 PROCEDURE — 1159F MED LIST DOCD IN RCRD: CPT | Mod: CPTII,S$GLB,, | Performed by: INTERNAL MEDICINE

## 2024-03-26 PROCEDURE — 99214 OFFICE O/P EST MOD 30 MIN: CPT | Mod: S$GLB,,, | Performed by: INTERNAL MEDICINE

## 2024-03-26 PROCEDURE — 3074F SYST BP LT 130 MM HG: CPT | Mod: CPTII,S$GLB,, | Performed by: INTERNAL MEDICINE

## 2024-03-26 PROCEDURE — 3008F BODY MASS INDEX DOCD: CPT | Mod: CPTII,S$GLB,, | Performed by: INTERNAL MEDICINE

## 2024-03-26 PROCEDURE — 3044F HG A1C LEVEL LT 7.0%: CPT | Mod: CPTII,S$GLB,, | Performed by: INTERNAL MEDICINE

## 2024-03-26 PROCEDURE — 3078F DIAST BP <80 MM HG: CPT | Mod: CPTII,S$GLB,, | Performed by: INTERNAL MEDICINE

## 2024-03-26 PROCEDURE — G2211 COMPLEX E/M VISIT ADD ON: HCPCS | Mod: S$GLB,,, | Performed by: INTERNAL MEDICINE

## 2024-03-26 PROCEDURE — 1160F RVW MEDS BY RX/DR IN RCRD: CPT | Mod: CPTII,S$GLB,, | Performed by: INTERNAL MEDICINE

## 2024-03-26 RX ORDER — DIAZEPAM 2 MG/1
4 TABLET ORAL NIGHTLY PRN
Qty: 60 TABLET | Refills: 0 | Status: SHIPPED | OUTPATIENT
Start: 2024-03-26 | End: 2024-04-25

## 2024-03-26 NOTE — PROGRESS NOTES
Subjective:       Patient ID: Rachel Leal is a 59 y.o. female.    Chief Complaint: Annual Exam      HPI:  Patient is known to me and presents for follow up HTN, Parkinson's, DNA positive hemochromotosis, h/o breast cancer and anxiety.  Labs from 3/23/24 personally reviewed and discussed with the patient today.     Ferritin 90, normal  GFR > 60, normal  LFTs normal  A1C 5.4%, normal  LDL 97, normal    HTN: on hyzaar 50-12.5mg. BP well controlled. Denies chest pains, SOB, LE edema.      GERD: on  nexium. No abd pain n/v/d/c. No dysphagia/odynophagia.       H/o breast cancer with metastasis to bone (stage VI at diagnosis): dx 2013. Follows with oncology. On arimidex and xgeva; s/p left mastectomy. Doing CT scans with oncology, no MMG.      Anxiety: Effexor and Valium currently. Mood is well controlled. Denies depressed mood. Denies SI. Using Valium some nights; not filling often (last 9/2023). Her mom who has Parkinson's also was recently put on Valium and finds this is helping her a lot.      HLD: on atorvastatin and omega 3. LDL at goal.  No medication side effects.      Parkinson's: she is taking amantadine and sinemet. She was given neuopro but resulted in cellulitis that responded well to antibx given at urgent care. Stopped the patch and hasn't had any further reactions. Neurology is managing     Hemochromotosis: diagnosed with positive DNA analysis but no sympotms. Ferritin normal. She does follow with hepatology. Last US 7/2019 showed fatty infiltration otherwise normal. Last AST/ALT normal, normal bilirubin. No history of blood sugar problems. Had bx with hepatology as well. Ferritin normal; mild increase iron sat noted on labs but stable.      She has noticed numbness and tingling of the right leg. Initially started just at night but now occurring day as well. Sx started about 6 months ago and progressing. Worse with prolonged sitting. Has chronic low back pain. No falls or injuries. Seeing neurology  who added gabapentin recently but she does't want to add meds.  MRI L spine ordered but not yet scheduled.         Past Medical History:   Diagnosis Date    Cancer     breast stage iv, remission 2014    Fatty liver     Hereditary hemochromatosis 12/16/2015    History of TMJ syndrome     Hyperlipidemia     Hypertension     Hypoglycemia     Osteoarthritis     Parkinson disease     Torn meniscus        Family History   Problem Relation Age of Onset    Colon cancer Mother     Diabetes Father     Heart disease Father     Hemochromatosis Brother     Cirrhosis Brother         hemochromotosis    Breast cancer Neg Hx     Ovarian cancer Neg Hx        Social History     Socioeconomic History    Marital status:    Tobacco Use    Smoking status: Former     Current packs/day: 0.00     Types: Cigarettes     Quit date: 9/12/2013     Years since quitting: 10.5    Smokeless tobacco: Never   Substance and Sexual Activity    Alcohol use: Yes     Comment: drinking 6 peers per month, no daily use     Drug use: No    Sexual activity: Yes     Partners: Male     Birth control/protection: Surgical     Comment: .       Review of Systems   Constitutional:  Negative for activity change, fatigue, fever and unexpected weight change.   HENT:  Negative for congestion, ear pain, hearing loss, rhinorrhea and sore throat.    Eyes:  Negative for redness and visual disturbance.   Respiratory:  Negative for cough, shortness of breath and wheezing.    Cardiovascular:  Negative for chest pain, palpitations and leg swelling.   Gastrointestinal:  Negative for abdominal pain, constipation, diarrhea, nausea and vomiting.   Genitourinary:  Negative for dysuria, frequency and urgency.   Musculoskeletal:  Positive for back pain. Negative for joint swelling and neck pain.   Skin:  Negative for color change, rash and wound.   Neurological:  Positive for tremors (chronic) and numbness. Negative for dizziness, weakness, light-headedness and headaches.          Objective:      Physical Exam  Vitals reviewed.   Constitutional:       General: She is not in acute distress.     Appearance: She is well-developed.   HENT:      Head: Normocephalic and atraumatic.      Right Ear: External ear normal.      Left Ear: External ear normal.      Nose: Nose normal.   Eyes:      General:         Right eye: No discharge.         Left eye: No discharge.      Conjunctiva/sclera: Conjunctivae normal.   Neck:      Thyroid: No thyromegaly.   Cardiovascular:      Rate and Rhythm: Normal rate and regular rhythm.      Heart sounds: No murmur heard.  Pulmonary:      Effort: Pulmonary effort is normal. No respiratory distress.      Breath sounds: Normal breath sounds. No wheezing.   Abdominal:      General: There is no distension.      Palpations: Abdomen is soft.      Tenderness: There is no abdominal tenderness.   Skin:     General: Skin is warm and dry.   Neurological:      Mental Status: She is alert and oriented to person, place, and time. Mental status is at baseline.   Psychiatric:         Behavior: Behavior normal.         Thought Content: Thought content normal.         Assessment:       1. Benign essential HTN    2. Hyperlipidemia, unspecified hyperlipidemia type    3. Neuropathy    4. Obesity (BMI 30-39.9)    5. Hereditary hemochromatosis    6. Fatty liver    7. Anxiety    8. Parkinson disease    9. History of breast cancer    10. Secondary malignant neoplasm of bone        Plan:       1. Benign essential HTN  Chronic controlled  Continue medications at same dose  Low Na diet  Exercise, weight loss  Check BP and keep log for next visit      2. Hyperlipidemia, unspecified hyperlipidemia type  Chronic controlled  Cont statin same dose    3. Neuropathy  Chronic uncontrolled  Agree with gabapentin ordered by neurology. Discussed with patient and I am OK with her using PRN for now since she doesn't want to add daily meds  Agree with MRI L spine--brina reach out to neurology team to help  get her scheduled    4. Obesity (BMI 30-39.9)  Chronic stable  Diet, exercise, weight loss    5. Hereditary hemochromatosis  Chronic stable  Ferritin normal  Follow labs  Follow up hepatology as planned  Overview:  Liver biopsy 12/2017 - Mild steatosis, macrovesicular 20% and microvesicular 10%. Mild to moderate siderosis, 2+, within hepatocytes. No fibrosis      6. Fatty liver  Chronic stable  Weight loss  Follow labs  Follow up hepatology as planned  Overview:  Liver biopsy 12/2017 - Mild steatosis, macrovesicular 20% and microvesicular 10%. Mild to moderate siderosis, 2+, within hepatocytes. No fibrosis      7. Anxiety  Chronic controlled  Cont effexor and RPN valium same dose   reviewed, no discrepancies  -     diazePAM (VALIUM) 2 MG tablet; Take 2 tablets (4 mg total) by mouth nightly as needed for Insomnia.  Dispense: 60 tablet; Refill: 0    8. Parkinson disease  Chronic controlled  Cont meds per neuro recommendations  Cont PRN valium- reviewed  -     diazePAM (VALIUM) 2 MG tablet; Take 2 tablets (4 mg total) by mouth nightly as needed for Insomnia.  Dispense: 60 tablet; Refill: 0    9. History of breast cancer  Cont oncology follow up as scheduled  Cont meds per onc recommendations    10. Secondary malignant neoplasm of bone  History noted  Cont meds per onc recommendations       RTC as scheduled and PRN

## 2024-04-02 LAB — METHYLMALONATE SERPL-SCNC: 0.18 NMOL/ML

## 2024-04-11 ENCOUNTER — HOSPITAL ENCOUNTER (OUTPATIENT)
Dept: RADIOLOGY | Facility: HOSPITAL | Age: 59
Discharge: HOME | End: 2024-04-11
Attending: PSYCHIATRY & NEUROLOGY
Payer: COMMERCIAL

## 2024-04-11 DIAGNOSIS — R20.0 RIGHT LEG NUMBNESS: ICD-10-CM

## 2024-04-11 PROCEDURE — 72148 MRI LUMBAR SPINE W/O DYE: CPT | Mod: TC

## 2024-04-11 PROCEDURE — 72148 MRI LUMBAR SPINE W/O DYE: CPT | Mod: 26,,, | Performed by: RADIOLOGY

## 2024-04-15 ENCOUNTER — PATIENT MESSAGE (OUTPATIENT)
Dept: NEUROLOGY | Facility: CLINIC | Age: 59
End: 2024-04-15
Payer: COMMERCIAL

## 2024-04-21 DIAGNOSIS — G20.A1 PARKINSON DISEASE: ICD-10-CM

## 2024-04-22 RX ORDER — AMANTADINE HYDROCHLORIDE 100 MG/1
100 CAPSULE, GELATIN COATED ORAL 3 TIMES DAILY
Qty: 270 CAPSULE | Refills: 1 | Status: SHIPPED | OUTPATIENT
Start: 2024-04-22

## 2024-05-13 ENCOUNTER — PATIENT MESSAGE (OUTPATIENT)
Dept: NEUROLOGY | Facility: CLINIC | Age: 59
End: 2024-05-13
Payer: COMMERCIAL

## 2024-05-21 ENCOUNTER — PATIENT OUTREACH (OUTPATIENT)
Dept: ADMINISTRATIVE | Facility: HOSPITAL | Age: 59
End: 2024-05-21
Payer: COMMERCIAL

## 2024-05-22 ENCOUNTER — PATIENT MESSAGE (OUTPATIENT)
Dept: NEUROLOGY | Facility: CLINIC | Age: 59
End: 2024-05-22
Payer: COMMERCIAL

## 2024-05-29 DIAGNOSIS — E78.5 HYPERLIPIDEMIA, UNSPECIFIED HYPERLIPIDEMIA TYPE: ICD-10-CM

## 2024-05-29 RX ORDER — LOSARTAN POTASSIUM AND HYDROCHLOROTHIAZIDE 12.5; 5 MG/1; MG/1
TABLET ORAL
Qty: 90 TABLET | Refills: 3 | Status: SHIPPED | OUTPATIENT
Start: 2024-05-29

## 2024-05-29 RX ORDER — ATORVASTATIN CALCIUM 40 MG/1
40 TABLET, FILM COATED ORAL DAILY
Qty: 90 TABLET | Refills: 3 | Status: SHIPPED | OUTPATIENT
Start: 2024-05-29

## 2024-05-29 NOTE — TELEPHONE ENCOUNTER
Refill Decision Note   Rachel Leal  is requesting a refill authorization.  Brief Assessment and Rationale for Refill:  Approve     Medication Therapy Plan:         Comments:     Note composed:3:59 PM 05/29/2024

## 2024-05-29 NOTE — TELEPHONE ENCOUNTER
No care due was identified.  Health [facility] Embedded Care Due Messages. Reference number: [id number].   5/29/2024 10:33:44 AM CDT

## 2024-05-29 NOTE — TELEPHONE ENCOUNTER
Refill Decision Note   Rachel Leal  is requesting a refill authorization.  Brief Assessment and Rationale for Refill:  Approve     Medication Therapy Plan:        Comments:     Note composed:11:32 AM 05/29/2024

## 2024-06-26 DIAGNOSIS — G20.A1 PARKINSON'S DISEASE, UNSPECIFIED WHETHER DYSKINESIA PRESENT, UNSPECIFIED WHETHER MANIFESTATIONS FLUCTUATE: ICD-10-CM

## 2024-06-26 DIAGNOSIS — F41.9 ANXIETY: ICD-10-CM

## 2024-06-26 RX ORDER — DIAZEPAM 2 MG/1
4 TABLET ORAL NIGHTLY PRN
Qty: 60 TABLET | Refills: 0 | Status: SHIPPED | OUTPATIENT
Start: 2024-06-26 | End: 2024-07-26

## 2024-06-26 NOTE — TELEPHONE ENCOUNTER
No care due was identified.  [facility] Embedded Care Due Messages. Reference number: [id number].   6/26/2024 9:03:27 AM CDT

## 2024-06-26 NOTE — TELEPHONE ENCOUNTER
Spoke with pt. Informed her Dr. Leal has not filled her oxybutynin for her. She said she remembers who fills in (her oncologist) and she will contact him for this refill.    Pt wanted to know if it was possible to have refills for her valium. Informed pt that I wasn't sure due to it being a controlled substance, but I would notify Dr. Leal to see if it was a possibility.     LOV 3/26/2024 (pt schedules annually)     Requested Prescriptions     Pending Prescriptions Disp Refills    diazePAM (VALIUM) 2 MG tablet 60 tablet 0     Sig: Take 2 tablets (4 mg total) by mouth nightly as needed for Insomnia.

## 2024-08-03 DIAGNOSIS — R07.9 CHEST PAIN, UNSPECIFIED TYPE: ICD-10-CM

## 2024-08-04 RX ORDER — ESOMEPRAZOLE MAGNESIUM 40 MG/1
40 CAPSULE, DELAYED RELEASE ORAL
Qty: 90 CAPSULE | Refills: 2 | Status: SHIPPED | OUTPATIENT
Start: 2024-08-04

## 2024-08-11 DIAGNOSIS — G20.A1 PARKINSON DISEASE: ICD-10-CM

## 2024-08-12 RX ORDER — AMANTADINE HYDROCHLORIDE 100 MG/1
100 CAPSULE, GELATIN COATED ORAL 3 TIMES DAILY
Qty: 270 CAPSULE | Refills: 1 | Status: SHIPPED | OUTPATIENT
Start: 2024-08-12

## 2024-10-02 DIAGNOSIS — F41.9 ANXIETY: ICD-10-CM

## 2024-10-02 DIAGNOSIS — G20.A1 PARKINSON'S DISEASE, UNSPECIFIED WHETHER DYSKINESIA PRESENT, UNSPECIFIED WHETHER MANIFESTATIONS FLUCTUATE: ICD-10-CM

## 2024-10-02 NOTE — TELEPHONE ENCOUNTER
No care due was identified.  [facility] Embedded Care Due Messages. Reference number: [id number].   10/02/2024 1:18:56 PM CDT

## 2024-10-04 RX ORDER — DIAZEPAM 2 MG/1
4 TABLET ORAL NIGHTLY PRN
Qty: 60 TABLET | Refills: 0 | Status: SHIPPED | OUTPATIENT
Start: 2024-10-04 | End: 2024-11-03

## 2024-10-28 ENCOUNTER — TELEPHONE (OUTPATIENT)
Dept: INTERNAL MEDICINE | Facility: CLINIC | Age: 59
End: 2024-10-28
Payer: COMMERCIAL

## 2024-10-29 ENCOUNTER — TELEPHONE (OUTPATIENT)
Dept: ORTHOPEDICS | Facility: CLINIC | Age: 59
End: 2024-10-29
Payer: COMMERCIAL

## 2024-10-29 ENCOUNTER — OFFICE VISIT (OUTPATIENT)
Dept: ORTHOPEDICS | Facility: CLINIC | Age: 59
End: 2024-10-29
Payer: COMMERCIAL

## 2024-10-29 ENCOUNTER — HOSPITAL ENCOUNTER (OUTPATIENT)
Dept: RADIOLOGY | Facility: HOSPITAL | Age: 59
Discharge: HOME | End: 2024-10-29
Attending: PHYSICIAN ASSISTANT
Payer: COMMERCIAL

## 2024-10-29 VITALS — BODY MASS INDEX: 33.63 KG/M2 | RESPIRATION RATE: 18 BRPM | HEART RATE: 97 BPM | OXYGEN SATURATION: 98 % | HEIGHT: 62 IN

## 2024-10-29 DIAGNOSIS — M79.604 RIGHT LEG PAIN: Primary | ICD-10-CM

## 2024-10-29 DIAGNOSIS — R52 PAIN: ICD-10-CM

## 2024-10-29 DIAGNOSIS — R52 PAIN: Primary | ICD-10-CM

## 2024-10-29 PROCEDURE — 73552 X-RAY EXAM OF FEMUR 2/>: CPT | Mod: TC,RT

## 2024-10-29 PROCEDURE — 3044F HG A1C LEVEL LT 7.0%: CPT | Mod: CPTII,S$GLB,, | Performed by: PHYSICIAN ASSISTANT

## 2024-10-29 PROCEDURE — 3008F BODY MASS INDEX DOCD: CPT | Mod: CPTII,S$GLB,, | Performed by: PHYSICIAN ASSISTANT

## 2024-10-29 PROCEDURE — 99204 OFFICE O/P NEW MOD 45 MIN: CPT | Mod: S$GLB,,, | Performed by: PHYSICIAN ASSISTANT

## 2024-10-29 PROCEDURE — 1159F MED LIST DOCD IN RCRD: CPT | Mod: CPTII,S$GLB,, | Performed by: PHYSICIAN ASSISTANT

## 2024-10-29 PROCEDURE — 73552 X-RAY EXAM OF FEMUR 2/>: CPT | Mod: 26,RT,, | Performed by: RADIOLOGY

## 2024-10-29 PROCEDURE — 1160F RVW MEDS BY RX/DR IN RCRD: CPT | Mod: CPTII,S$GLB,, | Performed by: PHYSICIAN ASSISTANT

## 2024-10-29 PROCEDURE — 99999 PR PBB SHADOW E&M-EST. PATIENT-LVL IV: CPT | Mod: PBBFAC,,, | Performed by: PHYSICIAN ASSISTANT

## 2024-10-29 RX ORDER — HYDROCODONE BITARTRATE AND ACETAMINOPHEN 5; 325 MG/1; MG/1
1 TABLET ORAL EVERY 6 HOURS PRN
COMMUNITY
Start: 2024-10-24

## 2024-11-08 ENCOUNTER — TELEPHONE (OUTPATIENT)
Dept: INTERNAL MEDICINE | Facility: CLINIC | Age: 59
End: 2024-11-08
Payer: COMMERCIAL

## 2024-11-08 NOTE — TELEPHONE ENCOUNTER
Fax received for patient. She is having surgery on Monday for a femur repair. Patient paperwork on your desk for review. Please make a note to clear patient for surgery

## 2024-11-08 NOTE — TELEPHONE ENCOUNTER
Using Karlos Revised Cardiac Risk Index she is low risk for surgery:. From a medical standpoint she can proceed with her scheduled surgery without any further testing.  CAD: no  CHF: no  CVA: no  DM on insulin: no  Cr >2: no    MET >4: no--limited by neuromuscular disease  Tobacco: no  EtOH: no      Outside labs sent to me and reviewed:  GFR > 60  LFTs normal  H/H 14/42-normal  Plt 170-normal    CXR without acute changes    EKG faxed to me was poor quality but on review I did not appreciate any ST-T wave changes. NSR rate 75 bpm. QT does not appear prolonged but again poor quality images.     She can continue current medications in the perioperative period. She is not on ASA, NSAIDs or blood thinner per my most recent medication list. ASA/NSAIDs can be held 5 days prior to surgery.    If she is to remain inpatient it is vital her medications for Parkinson's disease be resumed and dosed per her routine home dosing to maintain mobility.

## 2024-11-15 RX ORDER — CARBIDOPA AND LEVODOPA 25; 100 MG/1; MG/1
TABLET ORAL
Qty: 720 TABLET | Refills: 9 | Status: SHIPPED | OUTPATIENT
Start: 2024-11-15

## 2024-12-03 DIAGNOSIS — F41.9 ANXIETY: ICD-10-CM

## 2024-12-03 DIAGNOSIS — G20.A1 PARKINSON'S DISEASE, UNSPECIFIED WHETHER DYSKINESIA PRESENT, UNSPECIFIED WHETHER MANIFESTATIONS FLUCTUATE: ICD-10-CM

## 2024-12-03 RX ORDER — DIAZEPAM 2 MG/1
4 TABLET ORAL NIGHTLY PRN
Qty: 60 TABLET | Refills: 0 | Status: SHIPPED | OUTPATIENT
Start: 2024-12-03

## 2024-12-03 NOTE — TELEPHONE ENCOUNTER
No care due was identified.  Health [facility] Embedded Care Due Messages. Reference number: [id number].   12/03/2024 9:41:44 AM CST

## 2024-12-10 ENCOUNTER — TELEPHONE (OUTPATIENT)
Dept: INTERNAL MEDICINE | Facility: CLINIC | Age: 59
End: 2024-12-10
Payer: COMMERCIAL

## 2024-12-10 NOTE — TELEPHONE ENCOUNTER
----- Message from Philip sent at 12/10/2024  8:54 AM CST -----  Contact: pt  Rachel Leal  MRN: [id number]  : 1965  PCP: Cata Leal  Home Phone      [phone]  Work Phone      Not on file.  Mobile          [phone]  Home Phone      Not on file.      MESSAGE:     Pt need refill on atorvastatin (LIPITOR) 40 MG tablet              Preferred Pharmacy    [facility] #[id number] - [place] - [address]  [address] [place] [address]  Phone: [phone] Fax: [phone]  Hours: Not open 24 hours        [phone]

## 2024-12-16 NOTE — PROGRESS NOTES
He has shown episodes of atypical atrial flutter, which is not surprising given the multiple left atrial ablation lines performed at the time of his last procedure.  His ECG today is probably fibrillation, but could represent atypical atrial flutter.  In either case, I am aiming for rate control for now as noted above.  I would consider cardioversion and antiarrhythmic drug therapy if we cannot achieve reasonable rate control.   [facility]   Physical Therapy Treatment Note      Name: Rachel Mann [facility]  Clinic Number: [id number]    Therapy Diagnosis:   Encounter Diagnoses   Name Primary?    Balance disorder Yes    Parkinson disease     Functional gait abnormality      Physician: Anu Gresham MD    Visit Date: 9/20/2023    Physician Orders: PT Eval and Treat   Medical Diagnosis from Referral: G20 (ICD-10-CM) - Parkinson disease   Evaluation Date: 9/18/2023  Authorization Period Expiration: 12/31/2023  Plan of Care Expiration: 12/31/2023  Progress Note Due: Visit 4   Visit # / Visits authorized: 1/ 20  FOTO: 1/3     Precautions: Standard and Fall     Time In: 1030  Time Out: 1115  Total Billable Time: 45 minutes          Subjective     Patient reports: no new complaints .  She was compliant with home exercise program.  Response to previous treatment: -  Functional change: -    Pain: 0/10  Location: none     Objective      Objective Measures updated at progress report unless specified.     Treatment     Rachel received the treatments listed below:      therapeutic exercises to develop strength, endurance, ROM, flexibility, posture, and core stabilization for - minutes including:  -      neuromuscular re-education activities to improve: Balance, Coordination, Kinesthetic, Sense, Proprioception, and Posture for 25 minutes. The following activities were included:  PWR up with reach out x 5 slow, x 5 fast   PWR modified plantigrade twist x 10 slow, x 6 fast   PWR step with knee drive onto bench x 10 ea side   PWR stepping flow forward and backward with red theraband reaches 2 x 20'   PWR side step flow with downward reach 2 x 20' in ea direction     therapeutic activities to improve functional performance for 20  minutes, including:  Stand to kneeling x 5 on ea side   Genuflect floor to kneeling push ups 2 x 10 on ea side   Genuflect lunges for hip range of motion x 5 on ea side   Plank to pike x 5  Walk ups plank  "to standing x 3     gait training to improve functional mobility and safety for -  minutes, including:  -      Patient Education and Home Exercises       Education provided:   - physical therapy plan of care and goals  - parkinson's related symptoms and rigidity     Written Home Exercises Provided: yes. Exercises were reviewed and Rachel was able to demonstrate them prior to the end of the session.  Rachel demonstrated good  understanding of the education provided. See Electronic Medical Record under Patient Instructions for exercises provided during therapy sessions. Added thread the needle on 9/20    Assessment     Rachel tolerated session well. She had some crapping initially with kneeling position, but was able to manage after some stretching. She has significant restrictions in her hips and neck, will continue to make focus of future sessions.     Rachel Is progressing well towards her goals.   Patient prognosis is Good.     Patient will continue to benefit from skilled outpatient physical therapy to address the deficits listed in the problem list box on initial evaluation, provide pt/family education and to maximize pt's level of independence in the home and community environment.     Patient's spiritual, cultural and educational needs considered and pt agreeable to plan of care and goals.     Anticipated barriers to physical therapy: progressive disease     Goals: Short Term Goals:  1. Patient will be compliant with home exercise program to supplement therapy in decreasing pain with functional mobility.  2. Patient will improve MiniBest score by 4 points to improve functional mobility.  3. Patient will improve timed up and go score to </=10" to decrease fall risk in community.  4. Patient will improve 30" sit<>stand score to 10 with bilateral upper extremity support to demonstrate increased functional strength.      Long Term Goals:   1. Patient will improve FOTO score to </= 46% limited to decrease perceived " "limitation with maintaining/changing body position  2. Patient will improve MiniBest score to >/= 20 to decrease fall risk at home.  3. Patient will improve timed up and go score to </=9" to decrease fall risk in community.  4. Patient will improve 30" sit<>stand score to >/=12 with minimal upper extremity support to demonstrate increased functional strength.   5. Patient will report 0 falls since 9/18/2023 .      Plan     Plan of care Certification: 9/18/2023 to 12/31/2023.     Outpatient Physical Therapy 2 times weekly for 6 weeks to include the following interventions: Electrical Stimulation , Gait Training, Manual Therapy, Neuromuscular Re-ed, Orthotic Management and Training, Patient Education, Self Care, Therapeutic Activities, and Therapeutic Exercise.        Julissa Torres, PT     "

## 2025-01-01 DIAGNOSIS — E78.5 HYPERLIPIDEMIA, UNSPECIFIED HYPERLIPIDEMIA TYPE: ICD-10-CM

## 2025-01-01 NOTE — TELEPHONE ENCOUNTER
Care Due:                  Date            Visit Type   Department     Provider  --------------------------------------------------------------------------------                                EP -                              PRIMARY      [facility] INTER[facility]  Last Visit: 03-      [facility]   MEDICINE II    Cata Leal  Next Visit: None Scheduled  None         None Found                                                            Last  Test          Frequency    Reason                     Performed    Due Date  --------------------------------------------------------------------------------    CMP.........  12 months..  atorvastatin,              03- 03-                             losartan-hydrochlorothiaz                             lakshmi......................    Lipid Panel.  12 months..  atorvastatin.............  03- 03-    Health [facility] Embedded Care Due Messages. Reference number: [id number].   1/01/2025 9:11:15 AM CST   3 = A little assistance

## 2025-01-02 RX ORDER — ATORVASTATIN CALCIUM 40 MG/1
40 TABLET, FILM COATED ORAL DAILY
Qty: 90 TABLET | Refills: 0 | Status: SHIPPED | OUTPATIENT
Start: 2025-01-02

## 2025-01-02 NOTE — TELEPHONE ENCOUNTER
Provider Staff:  Action required for this patient    Requires labs      Please see care gap opportunities below in Care Due Message.    Thanks!  [facility]     Appointments      Date Provider   Last Visit   3/26/2024 Cata Leal MD   Next Visit   Visit date not found Cata Leal MD     Refill Decision Note   Rachel Mel  is requesting a refill authorization.    Brief Assessment and Rationale for Refill:  Approve       Medication Therapy Plan:         Comments:     Note composed:2:32 PM 01/02/2025

## 2025-01-16 ENCOUNTER — TELEPHONE (OUTPATIENT)
Dept: INTERNAL MEDICINE | Facility: CLINIC | Age: 60
End: 2025-01-16
Payer: COMMERCIAL

## 2025-01-16 NOTE — TELEPHONE ENCOUNTER
----- Message from Stephany sent at 2025 12:59 PM CST -----  Contact: Pt  Rachel Leal  MRN: [id number]  : 1965  PCP: Cata Leal  Home Phone      [phone]  Work Phone      Not on file.  Mobile          [phone]  Home Phone      Not on file.      MESSAGE:     Pt is requesting lab work due to being tired a lot. Pt states she did have surgery on 24 for a broken femur. Pt states she has been tired starting last week and this week.           Please advise   [phone]

## 2025-01-17 ENCOUNTER — OFFICE VISIT (OUTPATIENT)
Dept: INTERNAL MEDICINE | Facility: CLINIC | Age: 60
End: 2025-01-17
Payer: COMMERCIAL

## 2025-01-17 VITALS
RESPIRATION RATE: 20 BRPM | HEART RATE: 77 BPM | HEIGHT: 62 IN | DIASTOLIC BLOOD PRESSURE: 82 MMHG | WEIGHT: 157.63 LBS | BODY MASS INDEX: 29.01 KG/M2 | OXYGEN SATURATION: 99 % | SYSTOLIC BLOOD PRESSURE: 130 MMHG

## 2025-01-17 DIAGNOSIS — C79.51 SECONDARY MALIGNANT NEOPLASM OF BONE: ICD-10-CM

## 2025-01-17 DIAGNOSIS — E78.5 HYPERLIPIDEMIA, UNSPECIFIED HYPERLIPIDEMIA TYPE: ICD-10-CM

## 2025-01-17 DIAGNOSIS — S72.91XD CLOSED FRACTURE OF RIGHT FEMUR WITH ROUTINE HEALING, UNSPECIFIED FRACTURE MORPHOLOGY, UNSPECIFIED PORTION OF FEMUR, SUBSEQUENT ENCOUNTER: ICD-10-CM

## 2025-01-17 DIAGNOSIS — F41.9 ANXIETY: ICD-10-CM

## 2025-01-17 DIAGNOSIS — G20.A1 PARKINSON'S DISEASE, UNSPECIFIED WHETHER DYSKINESIA PRESENT, UNSPECIFIED WHETHER MANIFESTATIONS FLUCTUATE: ICD-10-CM

## 2025-01-17 DIAGNOSIS — R42 DIZZINESS: Primary | ICD-10-CM

## 2025-01-17 DIAGNOSIS — R53.83 FATIGUE, UNSPECIFIED TYPE: ICD-10-CM

## 2025-01-17 PROCEDURE — 3079F DIAST BP 80-89 MM HG: CPT | Mod: CPTII,S$GLB,, | Performed by: NURSE PRACTITIONER

## 2025-01-17 PROCEDURE — 99999 PR PBB SHADOW E&M-EST. PATIENT-LVL IV: CPT | Mod: PBBFAC,,, | Performed by: NURSE PRACTITIONER

## 2025-01-17 PROCEDURE — 3075F SYST BP GE 130 - 139MM HG: CPT | Mod: CPTII,S$GLB,, | Performed by: NURSE PRACTITIONER

## 2025-01-17 PROCEDURE — 4010F ACE/ARB THERAPY RXD/TAKEN: CPT | Mod: CPTII,S$GLB,, | Performed by: NURSE PRACTITIONER

## 2025-01-17 PROCEDURE — 99214 OFFICE O/P EST MOD 30 MIN: CPT | Mod: S$GLB,,, | Performed by: NURSE PRACTITIONER

## 2025-01-17 PROCEDURE — 3008F BODY MASS INDEX DOCD: CPT | Mod: CPTII,S$GLB,, | Performed by: NURSE PRACTITIONER

## 2025-01-17 PROCEDURE — 1159F MED LIST DOCD IN RCRD: CPT | Mod: CPTII,S$GLB,, | Performed by: NURSE PRACTITIONER

## 2025-01-17 RX ORDER — LOSARTAN POTASSIUM 50 MG/1
1 TABLET ORAL EVERY MORNING
COMMUNITY

## 2025-01-17 RX ORDER — MELOXICAM 15 MG/1
15 TABLET ORAL DAILY
Start: 2025-01-17

## 2025-01-17 RX ORDER — CYCLOBENZAPRINE HCL 5 MG
5 TABLET ORAL EVERY 6 HOURS PRN
COMMUNITY
Start: 2024-10-29 | End: 2025-01-17

## 2025-01-17 RX ORDER — DIAZEPAM 2 MG/1
4 TABLET ORAL NIGHTLY PRN
Qty: 60 TABLET | Refills: 0 | Status: SHIPPED | OUTPATIENT
Start: 2025-01-17

## 2025-01-17 RX ORDER — MELOXICAM 15 MG/1
15 TABLET ORAL 3 TIMES DAILY
COMMUNITY
Start: 2025-01-10 | End: 2025-01-17 | Stop reason: SDUPTHER

## 2025-01-17 NOTE — PROGRESS NOTES
Subjective:       Patient ID: Rachel Leal is a 59 y.o. female.    Chief Complaint: Fatigue (No energy- and dizziness x few weeks happening daily )      History of Present Illness    CHIEF COMPLAINT:  Rachel presents today for dizziness and fatigue.    HISTORY OF PRESENT ILLNESS:  She reports experiencing dizziness for the past 2 weeks with symptoms remaining stable. Episodes last approximately 30 seconds and are triggered by standing position, walking, head movements, and positional changes. Concurrent fatigue began around the same time, which she notes is not normal for her baseline.    RECENT SURGICAL HISTORY:  She underwent thigh surgery on November 11th and was prescribed muscle relaxants, anti-inflammatory medications, and anticoagulants post-operatively. Blood thinners were administered for 14 days.    MEDICAL HISTORY:  She has Parkinson's disease dx 52yo, breast cancer with metastasis diagnosed 11 years ago, and iron deficiency. Family history is significant for Parkinson's disease.    CURRENT MEDICATIONS:  She takes muscle relaxer daily and anti-inflammatory daily following recent thigh surgery, Venlafaxine 75 mg for mood, an aromatase inhibitor, and Valium as needed.> these are not new      ROS:  Constitutional: +fatigue, +dizziness          Objective:      Physical Exam    Vitals: Normal blood pressure.  General: No acute distress. Well-developed. Well-nourished.  Eyes: EOMI. Sclerae anicteric.  HENT: Normocephalic. Atraumatic. Nares patent. Moist oral mucosa.  Ears: Bilateral TMs clear. Bilateral EACs clear.  Cardiovascular: Regular rate. Regular rhythm. No murmurs. No rubs. No gallops. Normal S1, S2.  Respiratory: Normal respiratory effort. Clear to auscultation bilaterally. No rales. No rhonchi. No wheezing.  Abdomen: Soft. Non-tender. Non-distended. Normoactive bowel sounds.  Musculoskeletal: No  obvious deformity.  Extremities: No lower extremity edema.  Neurological: Alert & oriented x3. No  slurred speech. Normal gait.  Psychiatric: Normal mood. Normal affect. Good insight. Good judgment.  Skin: Warm. Dry. No rash.          Assessment:       1. Dizziness    2. Secondary malignant neoplasm of bone    3. Parkinson's disease, unspecified whether dyskinesia present, unspecified whether manifestations fluctuate    4. Fatigue, unspecified type    5. Closed fracture of right femur with routine healing, unspecified fracture morphology, unspecified portion of femur, subsequent encounter    6. Hyperlipidemia, unspecified hyperlipidemia type    7. Anxiety        Plan:     Problem List Items Addressed This Visit       Hyperlipidemia    Relevant Orders    Lipid Panel    Anxiety    Relevant Medications    diazePAM (VALIUM) 2 MG tablet    Parkinson disease    Relevant Medications    diazePAM (VALIUM) 2 MG tablet    Secondary malignant neoplasm of bone   Following with oncology on arimidex        Dizziness    -  Primary    Relevant Orders    Comprehensive Metabolic Panel    Fatigue, unspecified type        Relevant Orders    Vitamin B12    CBC Auto Differential    Ferritin    Iron and TIBC    Comprehensive Metabolic Panel    TSH    Closed fracture of right femur with routine healing, unspecified fracture morphology, unspecified portion of femur, subsequent encounter        Relevant Medications    meloxicam (MOBIC) 15 MG tablet          Assessment & Plan    IMPRESSION:  - Assessed dizziness and fatigue symptoms, considering potential causes including medication side effects, anemia, and vertigo  - Ruled out anti-inflammatory medication as cause of symptoms due to correct dosage  - Discontinued muscle relaxer due to lack of perceived benefit and potential contribution to fatigue  - Considered vertigo as possible diagnosis, but avoided prescribing antihistamines due to potential worsening of Parkinson's symptoms  - Planned to review lab results before considering physical therapy for vertigo  - Recommend trial of daily  Valium to potentially alleviate vertigo symptoms without exacerbating Parkinson's    SECONDARY MALIGNANT NEOPLASM OF BONE:  - Noted patient's 11-year history of breast cancer metastasized to bones.  - Rachel experienced a leg fracture due to excessive bone hardening from Xgeva treatment.  - Currently on aromatase inhibitor (Arimidex) for breast cancer treatment.  - Ordered comprehensive labs including CBC, lipid panel, iron levels, B12 levels, and thyroid function to assess overall health status.    PARKINSON'S DISEASE, UNSPECIFIED WHETHER DYSKINESIA PRESENT, UNSPECIFIED WHETHER MANIFESTATIONS FLUCTUATE:  - Confirmed Parkinson's disease diagnosis with family history.  - Assessed patient's dizziness and fatigue, considering potential relation to Parkinson's and possible vertigo.  - Cautioned against using antihistamines like Dramamine, which can exacerbate Parkinson's symptoms.  - Recommend discontinuing the muscle relaxer and using Valium for sleep and potential vertigo relief.  - If symptoms persist after medication changes and lab results, will refer patient to physical therapy for vertigo management.    VERTIGO:  - Noted the patient's report of dizziness for the last 2 weeks, with symptoms remaining constant.  - Observed that dizziness occurs mostly when standing and walking, lasting about 30 seconds.  - Documented the patient's experience of room spinning sensation, with a feeling of lagging behind.  - Noted that dizziness is triggered by head movement and position changes.  - Performed physical exam to assess dizziness, including testing for positional vertigo.  - Suspected vertigo as a possible cause of dizziness, but aimed to rule out anemia before confirming diagnosis.  - Increased Valium dosage from as-needed to daily use temporarily.  - Refilled Valium prescription.  - Considered maneuvers to help with vertigo if lab work is normal.    BREAST CANCER:  - Noted the patient's history of breast cancer diagnosed  11 years ago at age 53, with metastasis to bones and lymph nodes.  - Acknowledged long-term use of aromatase inhibitors and questioned the duration of treatment.    RECENT SURGERY:  - Noted the patient was on short-term anticoagulant therapy following recent surgery.  - Assessed recent use of anticoagulants in relation to patient's surgery and potential impact on iron levels.  - Confirmed no current anticoagulant therapy; short-term use has ended.    MEDICATIONS/SUPPLEMENTS:  - Continued anti-inflammatory medication at 1 time daily.    LABS:  - Ordered comprehensive lab work including anemia panel, cholesterol panel, barotin levels, and CBCC and P.    FOLLOW UP:  - Follow up after lab results to reassess symptoms and consider additional interventions if needed.        Will need to add ep alex maneuvers to PT orders if l;abs ok and valium gives no relief     This note was generated with the assistance of ambient listening technology. Verbal consent was obtained by the patient and accompanying visitor(s) for the recording of patient appointment to facilitate this note. I attest to having reviewed and edited the generated note for accuracy, though some syntax or spelling errors may persist. Please contact the author of this note for any clarification.

## 2025-01-18 ENCOUNTER — LAB VISIT (OUTPATIENT)
Dept: LAB | Facility: HOSPITAL | Age: 60
End: 2025-01-18
Attending: NURSE PRACTITIONER
Payer: COMMERCIAL

## 2025-01-18 DIAGNOSIS — E78.5 HYPERLIPIDEMIA, UNSPECIFIED HYPERLIPIDEMIA TYPE: ICD-10-CM

## 2025-01-18 DIAGNOSIS — R53.83 FATIGUE, UNSPECIFIED TYPE: ICD-10-CM

## 2025-01-18 DIAGNOSIS — R42 DIZZINESS: ICD-10-CM

## 2025-01-18 LAB
ALBUMIN SERPL BCP-MCNC: 4 G/DL (ref 3.5–5.2)
ALP SERPL-CCNC: 153 U/L (ref 40–150)
ALT SERPL W/O P-5'-P-CCNC: <5 U/L (ref 10–44)
ANION GAP SERPL CALC-SCNC: 13 MMOL/L (ref 8–16)
AST SERPL-CCNC: 8 U/L (ref 10–40)
BASOPHILS # BLD AUTO: 0.02 K/UL (ref 0–0.2)
BASOPHILS NFR BLD: 0.3 % (ref 0–1.9)
BILIRUB SERPL-MCNC: 0.6 MG/DL (ref 0.1–1)
BUN SERPL-MCNC: 16 MG/DL (ref 8–23)
CALCIUM SERPL-MCNC: 9.9 MG/DL (ref 8.7–10.5)
CHLORIDE SERPL-SCNC: 104 MMOL/L (ref 95–110)
CHOLEST SERPL-MCNC: 153 MG/DL (ref 120–199)
CHOLEST/HDLC SERPL: 2.8 {RATIO} (ref 2–5)
CO2 SERPL-SCNC: 26 MMOL/L (ref 23–29)
CREAT SERPL-MCNC: 0.7 MG/DL (ref 0.5–1.4)
DIFFERENTIAL METHOD BLD: ABNORMAL
EOSINOPHIL # BLD AUTO: 0 K/UL (ref 0–0.5)
EOSINOPHIL NFR BLD: 0.6 % (ref 0–8)
ERYTHROCYTE [DISTWIDTH] IN BLOOD BY AUTOMATED COUNT: 11.9 % (ref 11.5–14.5)
EST. GFR  (NO RACE VARIABLE): >60 ML/MIN/1.73 M^2
FERRITIN SERPL-MCNC: 160 NG/ML (ref 20–300)
GLUCOSE SERPL-MCNC: 106 MG/DL (ref 70–110)
HCT VFR BLD AUTO: 40.6 % (ref 37–48.5)
HDLC SERPL-MCNC: 54 MG/DL (ref 40–75)
HDLC SERPL: 35.3 % (ref 20–50)
HGB BLD-MCNC: 13.6 G/DL (ref 12–16)
IMM GRANULOCYTES # BLD AUTO: 0.01 K/UL (ref 0–0.04)
IMM GRANULOCYTES NFR BLD AUTO: 0.2 % (ref 0–0.5)
IRON SERPL-MCNC: 133 UG/DL (ref 30–160)
LDLC SERPL CALC-MCNC: 77.4 MG/DL (ref 63–159)
LYMPHOCYTES # BLD AUTO: 0.9 K/UL (ref 1–4.8)
LYMPHOCYTES NFR BLD: 13.3 % (ref 18–48)
MCH RBC QN AUTO: 30.5 PG (ref 27–31)
MCHC RBC AUTO-ENTMCNC: 33.5 G/DL (ref 32–36)
MCV RBC AUTO: 91 FL (ref 82–98)
MONOCYTES # BLD AUTO: 0.5 K/UL (ref 0.3–1)
MONOCYTES NFR BLD: 7.1 % (ref 4–15)
NEUTROPHILS # BLD AUTO: 5.2 K/UL (ref 1.8–7.7)
NEUTROPHILS NFR BLD: 78.5 % (ref 38–73)
NONHDLC SERPL-MCNC: 99 MG/DL
NRBC BLD-RTO: 0 /100 WBC
PLATELET # BLD AUTO: 177 K/UL (ref 150–450)
PMV BLD AUTO: 11.8 FL (ref 9.2–12.9)
POTASSIUM SERPL-SCNC: 3.9 MMOL/L (ref 3.5–5.1)
PROT SERPL-MCNC: 7.7 G/DL (ref 6–8.4)
RBC # BLD AUTO: 4.46 M/UL (ref 4–5.4)
SATURATED IRON: 53 % (ref 20–50)
SODIUM SERPL-SCNC: 143 MMOL/L (ref 136–145)
TOTAL IRON BINDING CAPACITY: 250 UG/DL (ref 250–450)
TRANSFERRIN SERPL-MCNC: 169 MG/DL (ref 200–375)
TRIGL SERPL-MCNC: 108 MG/DL (ref 30–150)
TSH SERPL DL<=0.005 MIU/L-ACNC: 1.92 UIU/ML (ref 0.4–4)
VIT B12 SERPL-MCNC: 995 PG/ML (ref 210–950)
WBC # BLD AUTO: 6.63 K/UL (ref 3.9–12.7)

## 2025-01-18 PROCEDURE — 36415 COLL VENOUS BLD VENIPUNCTURE: CPT | Performed by: NURSE PRACTITIONER

## 2025-01-18 PROCEDURE — 80061 LIPID PANEL: CPT | Performed by: NURSE PRACTITIONER

## 2025-01-18 PROCEDURE — 80053 COMPREHEN METABOLIC PANEL: CPT | Performed by: NURSE PRACTITIONER

## 2025-01-18 PROCEDURE — 82607 VITAMIN B-12: CPT | Performed by: NURSE PRACTITIONER

## 2025-01-18 PROCEDURE — 84443 ASSAY THYROID STIM HORMONE: CPT | Performed by: NURSE PRACTITIONER

## 2025-01-18 PROCEDURE — 82728 ASSAY OF FERRITIN: CPT | Performed by: NURSE PRACTITIONER

## 2025-01-18 PROCEDURE — 85025 COMPLETE CBC W/AUTO DIFF WBC: CPT | Performed by: NURSE PRACTITIONER

## 2025-01-18 PROCEDURE — 83540 ASSAY OF IRON: CPT | Performed by: NURSE PRACTITIONER

## 2025-01-22 ENCOUNTER — PATIENT MESSAGE (OUTPATIENT)
Dept: INTERNAL MEDICINE | Facility: CLINIC | Age: 60
End: 2025-01-22
Payer: COMMERCIAL

## 2025-01-24 ENCOUNTER — TELEPHONE (OUTPATIENT)
Dept: INTERNAL MEDICINE | Facility: CLINIC | Age: 60
End: 2025-01-24
Payer: COMMERCIAL

## 2025-01-24 NOTE — TELEPHONE ENCOUNTER
----- Message from Stephany sent at 2025  8:06 AM CST -----  Contact: Pt  Rachel Leal  MRN: [id number]  : 1965  PCP: Cata Leal  Home Phone      [phone]  Work Phone      Not on file.  Mobile          [phone]  Home Phone      Not on file.      MESSAGE:     Pt needs lab results.       Please advise   [phone]

## 2025-01-31 RX ORDER — LOSARTAN POTASSIUM AND HYDROCHLOROTHIAZIDE 12.5; 5 MG/1; MG/1
TABLET ORAL
Qty: 90 TABLET | Refills: 3 | OUTPATIENT
Start: 2025-01-31

## 2025-01-31 NOTE — TELEPHONE ENCOUNTER
Refill Decision Note   Rachel Leal  is requesting a refill authorization.  Brief Assessment and Rationale for Refill:  Quick Discontinue     Medication Therapy Plan:  Med d/c by Bea Steel MA on 1/17/2025 ; [facility]      Comments:     Note composed:2:41 AM 01/31/2025

## 2025-01-31 NOTE — TELEPHONE ENCOUNTER
Unable to retrieve patient chart and identify care due.  [facility] Embedded Care Due Messages. Reference number: [id number].   1/31/2025 12:17:34 AM CST

## 2025-02-17 ENCOUNTER — TELEPHONE (OUTPATIENT)
Dept: INTERNAL MEDICINE | Facility: CLINIC | Age: 60
End: 2025-02-17
Payer: COMMERCIAL

## 2025-02-17 NOTE — TELEPHONE ENCOUNTER
----- Message from Stephany sent at 2/17/2025  8:15 AM CST -----  Contact: Pt  Rachel Mann MelMRN: [id number]: 1965PCP: Mel CataDeng Phone      [phone] Phone      Not on file.Mobile          [phone] Phone      Not on file.MESSAGE: Pt states she was seen recently and was having dizzy spells. Pt states she is still having dizzy spells and states she feels maybe her blood pressure med needs to be changed. Pt states the only other issue she Is having is pain from surgery in November from [employer]. Pt seems to have confusion and is unable to remember her BP readings. Please advise [phone]

## 2025-02-17 NOTE — TELEPHONE ENCOUNTER
Patient notified. Verbalized understanding  Patient scheduled with pcp and instructed to call neuro to schedule.

## 2025-02-17 NOTE — TELEPHONE ENCOUNTER
She is well past due for a follow up with her PCP (LOV 3/2024) she has several reasons for dizziness as discuss at her visit here.   Also looks like it has been about a year since her last neuro appt-   I do not mind adjusting her blood pressure meds (she can increase her losartan to 2-50mg tablets which would be 100mg daily)   Have her keep a blood pressure log twice a day and make her a follow up appt with PCP to review and a follow up wit neuro incase she is still having dizziness which maybe related to her medications or the parkinson dx itself

## 2025-02-17 NOTE — TELEPHONE ENCOUNTER
Spoke with patient. She said that she is still having a lot of dizzines. She stated that it comes and goes. Patient stated that it is when she is turning to go a different direction is when she gets dizzy. Patient stated that her home bp readings have been staying around the 150s over upper 90s. Please advise.

## 2025-02-19 DIAGNOSIS — G20.A1 PARKINSON DISEASE: ICD-10-CM

## 2025-02-24 ENCOUNTER — OFFICE VISIT (OUTPATIENT)
Dept: INTERNAL MEDICINE | Facility: CLINIC | Age: 60
End: 2025-02-24
Payer: COMMERCIAL

## 2025-02-24 VITALS
HEIGHT: 62 IN | HEART RATE: 80 BPM | WEIGHT: 146.63 LBS | RESPIRATION RATE: 18 BRPM | SYSTOLIC BLOOD PRESSURE: 140 MMHG | DIASTOLIC BLOOD PRESSURE: 84 MMHG | BODY MASS INDEX: 26.98 KG/M2 | OXYGEN SATURATION: 97 %

## 2025-02-24 DIAGNOSIS — R42 VERTIGO: ICD-10-CM

## 2025-02-24 DIAGNOSIS — E83.110 HEREDITARY HEMOCHROMATOSIS: ICD-10-CM

## 2025-02-24 DIAGNOSIS — E78.5 HYPERLIPIDEMIA, UNSPECIFIED HYPERLIPIDEMIA TYPE: ICD-10-CM

## 2025-02-24 DIAGNOSIS — G20.A2 PARKINSON'S DISEASE WITHOUT DYSKINESIA, WITH FLUCTUATING MANIFESTATIONS: ICD-10-CM

## 2025-02-24 DIAGNOSIS — F41.9 ANXIETY: ICD-10-CM

## 2025-02-24 DIAGNOSIS — I10 BENIGN ESSENTIAL HTN: Primary | ICD-10-CM

## 2025-02-24 DIAGNOSIS — R26.89 FUNCTIONAL GAIT ABNORMALITY: ICD-10-CM

## 2025-02-24 DIAGNOSIS — K76.0 FATTY LIVER: ICD-10-CM

## 2025-02-24 PROCEDURE — 3077F SYST BP >= 140 MM HG: CPT | Mod: CPTII,S$GLB,, | Performed by: INTERNAL MEDICINE

## 2025-02-24 PROCEDURE — 1159F MED LIST DOCD IN RCRD: CPT | Mod: CPTII,S$GLB,, | Performed by: INTERNAL MEDICINE

## 2025-02-24 PROCEDURE — 99215 OFFICE O/P EST HI 40 MIN: CPT | Mod: S$GLB,,, | Performed by: INTERNAL MEDICINE

## 2025-02-24 PROCEDURE — 4010F ACE/ARB THERAPY RXD/TAKEN: CPT | Mod: CPTII,S$GLB,, | Performed by: INTERNAL MEDICINE

## 2025-02-24 PROCEDURE — 3008F BODY MASS INDEX DOCD: CPT | Mod: CPTII,S$GLB,, | Performed by: INTERNAL MEDICINE

## 2025-02-24 PROCEDURE — G2211 COMPLEX E/M VISIT ADD ON: HCPCS | Mod: S$GLB,,, | Performed by: INTERNAL MEDICINE

## 2025-02-24 PROCEDURE — 3079F DIAST BP 80-89 MM HG: CPT | Mod: CPTII,S$GLB,, | Performed by: INTERNAL MEDICINE

## 2025-02-24 PROCEDURE — 99999 PR PBB SHADOW E&M-EST. PATIENT-LVL III: CPT | Mod: PBBFAC,,, | Performed by: INTERNAL MEDICINE

## 2025-02-24 PROCEDURE — 1160F RVW MEDS BY RX/DR IN RCRD: CPT | Mod: CPTII,S$GLB,, | Performed by: INTERNAL MEDICINE

## 2025-02-24 RX ORDER — DIAZEPAM 2 MG/1
4 TABLET ORAL NIGHTLY PRN
Qty: 60 TABLET | Refills: 0 | Status: SHIPPED | OUTPATIENT
Start: 2025-02-24

## 2025-02-24 RX ORDER — AMANTADINE HYDROCHLORIDE 100 MG/1
100 CAPSULE, GELATIN COATED ORAL 3 TIMES DAILY
Qty: 270 CAPSULE | Refills: 0 | Status: SHIPPED | OUTPATIENT
Start: 2025-02-24

## 2025-02-24 RX ORDER — AMLODIPINE BESYLATE 5 MG/1
5 TABLET ORAL DAILY
Qty: 90 TABLET | Refills: 3 | Status: SHIPPED | OUTPATIENT
Start: 2025-02-24 | End: 2025-02-28 | Stop reason: DRUGHIGH

## 2025-02-24 NOTE — PROGRESS NOTES
Subjective:       Patient ID: Rachel Leal is a 59 y.o. female.    Chief Complaint: Follow-up and Dizziness (Pt is here for dizziness)      HPI:    Patient is known to me and presents for follow up HTN, Parkinson's, DNA positive hemochromotosis, h/o breast cancer and anxiety.  Labs from 1/18/25 personally reviewed, interpreted and discussed today  TSH 1.9, normal  GFR >60  LDL 77, normal  Ferritn 160, controlled  B12 995, mild elevation     HTN: on losartan 100mg. Was on 50mg but recently called with elevated BP at home and increased to 100mg (2 tablets). BP remains above goal with -150s at home and DBP  at home. Denies chest pains, SOB, LE edema.      GERD: on  nexium. No abd pain n/v/d/c. No dysphagia/odynophagia.       H/o breast cancer with metastasis to bone (stage VI at diagnosis): dx 2013. Follows with oncology. On arimidex; s/p left mastectomy. Doing CT scans with oncology, no MMG.   Was on xgeva but stopped since last visit.  She had a fall and fracture of right femur. S/p surgery 11/2024 and remains in PT. Walking with cane. Healing slowly.     Anxiety: Effexor and Valium currently. Mood is well controlled. Denies depressed mood. Denies SI. Using Valium some nights.      HLD: on atorvastatin and omega 3. LDL at goal.  No medication side effects.      Parkinson's: she is taking amantadine and carbidopa-levodopa. She was given neuopro but resulted in cellulitis that responded well to antibx given at urgent care. Stopped the patch and hasn't had any further reactions. Neurology is managing  Since last visit she is developing worsening dizziness. Was seen by colleage Jan 2024. She is having sx with walking and sometimes with position change. Described as off balance/spinning. Does not feel like she is going to pass out.  On ditropan from outside physician as well but this is not new and she does not feel correlated with her sx.      Hemochromotosis: diagnosed with positive DNA analysis but  no sympotms. Ferritin normal. She does follow with hepatology. Last US 7/2019 showed fatty infiltration otherwise normal. Last AST/ALT normal, normal bilirubin. No history of blood sugar problems. Had bx with hepatology as well. Ferritin normal; mild increase iron sat noted on labs but stable.       Past Medical History:   Diagnosis Date    Cancer     breast stage iv, remission 2014    Fatty liver     Hereditary hemochromatosis 12/16/2015    History of TMJ syndrome     Hyperlipidemia     Hypertension     Hypoglycemia     Osteoarthritis     Parkinson disease     Torn meniscus        Family History   Problem Relation Name Age of Onset    Colon cancer Mother      Diabetes Father      Heart disease Father      Hemochromatosis Brother      Cirrhosis Brother          hemochromotosis    Breast cancer Neg Hx      Ovarian cancer Neg Hx         Social History[1]    Review of Systems   Constitutional:  Negative for activity change, fatigue, fever and unexpected weight change.   HENT:  Negative for congestion, ear pain, hearing loss, rhinorrhea and sore throat.    Eyes:  Negative for redness and visual disturbance.   Respiratory:  Negative for cough, shortness of breath and wheezing.    Cardiovascular:  Negative for chest pain, palpitations and leg swelling.   Gastrointestinal:  Negative for abdominal pain, constipation, diarrhea, nausea and vomiting.   Musculoskeletal:  Positive for arthralgias. Negative for back pain, joint swelling and neck pain.   Skin:  Negative for color change, rash and wound.   Neurological:  Positive for dizziness and weakness. Negative for headaches.         Objective:      Physical Exam  Vitals reviewed.   Constitutional:       General: She is not in acute distress.     Appearance: She is well-developed.   HENT:      Head: Normocephalic and atraumatic.      Right Ear: External ear normal.      Left Ear: External ear normal.      Nose: Nose normal.   Eyes:      General:         Right eye: No  discharge.         Left eye: No discharge.      Conjunctiva/sclera: Conjunctivae normal.   Neck:      Thyroid: No thyromegaly.   Cardiovascular:      Rate and Rhythm: Normal rate and regular rhythm.   Pulmonary:      Effort: Pulmonary effort is normal. No respiratory distress.      Breath sounds: Normal breath sounds.   Abdominal:      General: There is no distension.      Palpations: Abdomen is soft.      Tenderness: There is no abdominal tenderness.   Skin:     General: Skin is warm and dry.   Neurological:      Mental Status: She is alert and oriented to person, place, and time. Mental status is at baseline.      Comments: Masked facies  Stiffness-baseline         Assessment:       1. Benign essential HTN    2. Hyperlipidemia, unspecified hyperlipidemia type    3. Parkinson's disease without dyskinesia, with fluctuating manifestations    4. Hereditary hemochromatosis    5. Fatty liver    6. Functional gait abnormality    7. Vertigo    8. Anxiety        Plan:       1. Benign essential HTN  Chronic uncontrolled  Continue medications at same dose: losartan 100mg  ADD amlodipine 5mg  Low Na diet  Exercise, weight loss  Check BP and keep log for next visit    Advised I would normally have patient return for nurse visit in 1-2 weeks. Due to her very limited mobility she will send me home login 1-2 weeks on new meds    -     amLODIPine (NORVASC) 5 MG tablet; Take 1 tablet (5 mg total) by mouth once daily.  Dispense: 90 tablet; Refill: 3    2. Hyperlipidemia, unspecified hyperlipidemia type  Chronic controlled  Cont statin same dose    3. Parkinson's disease without dyskinesia, with fluctuating manifestations  Chronic stable  Med refilled for anxiety/muscle relaxer effect  Not new med-dont' think contributing to new onset dizziness but consider  Cont meds per neuro  Advised to discuss dizziness with neuro as could be effect of medications and/or Parkinson's itself. Voiced understanding  -     diazePAM (VALIUM) 2 MG  tablet; Take 2 tablets (4 mg total) by mouth nightly as needed. AS NEEDED FOR INSOMNIA  Dispense: 60 tablet; Refill: 0    4. Hereditary hemochromatosis  Chronic stable  Cont lab monitoring  Overview:  Liver biopsy 2017 - Mild steatosis, macrovesicular 20% and microvesicular 10%. Mild to moderate siderosis, 2+, within hepatocytes. No fibrosis      5. Fatty liver  Chornic stable  Cont lab monitoring  + weight loss since leg fracture/surgery  Overview:  Liver biopsy 2017 - Mild steatosis, macrovesicular 20% and microvesicular 10%. Mild to moderate siderosis, 2+, within hepatocytes. No fibrosis      6. Functional gait abnormality  Chronic stable  Walks with cane  Cont therapy    7. Vertigo  New problem  Suspect vertigo but also discussed possibility of med side effects. Follow up neuro for meds  Start vertibular therapy  -     Ambulatory Referral/Consult to Physical Therapy/Occupational Therapy; Future; Expected date: 2025    8. Anxiety  Chronic stable  Meds refilled   reviewed, no discrepancies  -     diazePAM (VALIUM) 2 MG tablet; Take 2 tablets (4 mg total) by mouth nightly as needed. AS NEEDED FOR INSOMNIA  Dispense: 60 tablet; Refill: 0       RTC as scheduled for routine and PRN. Return if dizziness progressing despite therpay               [1]   Social History  Socioeconomic History    Marital status:    Tobacco Use    Smoking status: Former     Current packs/day: 0.00     Types: Cigarettes     Quit date: 2013     Years since quittin.4    Smokeless tobacco: Never   Substance and Sexual Activity    Alcohol use: Yes     Comment: drinking 6 peers per month, no daily use     Drug use: No    Sexual activity: Yes     Partners: Male     Birth control/protection: Surgical     Comment: .     Social Drivers of Health     Food Insecurity: Patient Declined (2024)    Received from [facility] and Its Subsidiaries and Affiliates    Hunger Vital  Sign     Worried About Running Out of Food in the Last Year: Patient declined     Ran Out of Food in the Last Year: Patient declined   Transportation Needs: Patient Declined (11/11/2024)    [facility]    PRAPARE - Transportation     Lack of Transportation (Medical): Patient declined     Lack of Transportation (Non-Medical): Patient declined   Housing Stability: Patient Declined (11/11/2024)    [facility]    Housing Stability Vital Sign     Unable to Pay for Housing in the Last Year: Patient declined     Homeless in the Last Year: Patient declined

## 2025-02-26 ENCOUNTER — TELEPHONE (OUTPATIENT)
Facility: CLINIC | Age: 60
End: 2025-02-26
Payer: COMMERCIAL

## 2025-02-26 NOTE — TELEPHONE ENCOUNTER
----- Message from Joyce sent at 2/25/2025  4:39 PM CST -----  Name of Caller:ENEIDA HORVATH [[id number]]When is the first available appointment? N/a Symptoms: dizzy, walking to right, and high blood pressure Best Call Back Number Telephone Information:Mobile          [phone] Information: pt is requesting to consult about brain procedure . Please advise

## 2025-02-26 NOTE — TELEPHONE ENCOUNTER
Received call from pt N/a Symptoms: dizzy, walking to right, and high blood pressure Best Call Back Number Telephone Information:Mobile [phone]    Returned call to pt. She reports:    Symptoms have been going on about a month with hypertension, having memory issues, and having trouble maneuvering.    /101 last week  /84 today    Pt reports she has seen PCP regarding BP    Reports she had surgery for broken thigh in November, but she can't maneuver well and is getting discouraged.     Reports walking is slow and wobbly. Leaning to the right. Pt is crying on the phone.    Requesting mirna't asap. Scheduled VV for Monday March 3 at 9 am to discuss with MD

## 2025-02-27 ENCOUNTER — TELEPHONE (OUTPATIENT)
Dept: INTERNAL MEDICINE | Facility: CLINIC | Age: 60
End: 2025-02-27
Payer: COMMERCIAL

## 2025-02-27 ENCOUNTER — HOSPITAL ENCOUNTER (EMERGENCY)
Facility: HOSPITAL | Age: 60
Discharge: HOME | End: 2025-02-27
Attending: SURGERY
Payer: COMMERCIAL

## 2025-02-27 VITALS
DIASTOLIC BLOOD PRESSURE: 78 MMHG | BODY MASS INDEX: 25.76 KG/M2 | RESPIRATION RATE: 19 BRPM | HEIGHT: 62 IN | HEART RATE: 71 BPM | SYSTOLIC BLOOD PRESSURE: 158 MMHG | OXYGEN SATURATION: 97 % | TEMPERATURE: 99 F | WEIGHT: 140 LBS

## 2025-02-27 DIAGNOSIS — E83.52 HYPERCALCEMIA: ICD-10-CM

## 2025-02-27 DIAGNOSIS — R53.83 FATIGUE: ICD-10-CM

## 2025-02-27 DIAGNOSIS — E86.0 DEHYDRATION: Primary | ICD-10-CM

## 2025-02-27 DIAGNOSIS — N30.00 ACUTE CYSTITIS WITHOUT HEMATURIA: ICD-10-CM

## 2025-02-27 LAB
ALBUMIN SERPL BCP-MCNC: 3.7 G/DL (ref 3.5–5.2)
ALP SERPL-CCNC: 178 U/L (ref 40–150)
ALT SERPL W/O P-5'-P-CCNC: <5 U/L (ref 10–44)
AMMONIA PLAS-SCNC: 38 UMOL/L (ref 10–50)
AMPHET+METHAMPHET UR QL: NEGATIVE
ANION GAP SERPL CALC-SCNC: 10 MMOL/L (ref 8–16)
ANION GAP SERPL CALC-SCNC: 10 MMOL/L (ref 8–16)
AST SERPL-CCNC: 8 U/L (ref 10–40)
BACTERIA #/AREA URNS HPF: ABNORMAL /HPF
BARBITURATES UR QL SCN>200 NG/ML: NEGATIVE
BASOPHILS # BLD AUTO: 0.01 K/UL (ref 0–0.2)
BASOPHILS NFR BLD: 0.2 % (ref 0–1.9)
BENZODIAZ UR QL SCN>200 NG/ML: ABNORMAL
BILIRUB SERPL-MCNC: 0.4 MG/DL (ref 0.1–1)
BILIRUB UR QL STRIP: NEGATIVE
BNP SERPL-MCNC: 11 PG/ML (ref 0–99)
BUN SERPL-MCNC: 15 MG/DL (ref 8–23)
BUN SERPL-MCNC: 19 MG/DL (ref 8–23)
BZE UR QL SCN: NEGATIVE
CALCIUM SERPL-MCNC: 11 MG/DL (ref 8.7–10.5)
CALCIUM SERPL-MCNC: 12.3 MG/DL (ref 8.7–10.5)
CANNABINOIDS UR QL SCN: NEGATIVE
CHLORIDE SERPL-SCNC: 104 MMOL/L (ref 95–110)
CHLORIDE SERPL-SCNC: 98 MMOL/L (ref 95–110)
CK SERPL-CCNC: 41 U/L (ref 20–180)
CLARITY UR: CLEAR
CO2 SERPL-SCNC: 28 MMOL/L (ref 23–29)
CO2 SERPL-SCNC: 31 MMOL/L (ref 23–29)
COLOR UR: YELLOW
CREAT SERPL-MCNC: 1 MG/DL (ref 0.5–1.4)
CREAT SERPL-MCNC: 1.3 MG/DL (ref 0.5–1.4)
CREAT UR-MCNC: 68.2 MG/DL (ref 15–325)
DIFFERENTIAL METHOD BLD: ABNORMAL
EOSINOPHIL # BLD AUTO: 0 K/UL (ref 0–0.5)
EOSINOPHIL NFR BLD: 0.5 % (ref 0–8)
ERYTHROCYTE [DISTWIDTH] IN BLOOD BY AUTOMATED COUNT: 12.4 % (ref 11.5–14.5)
EST. GFR  (NO RACE VARIABLE): 47 ML/MIN/1.73 M^2
EST. GFR  (NO RACE VARIABLE): >60 ML/MIN/1.73 M^2
GLUCOSE SERPL-MCNC: 111 MG/DL (ref 70–110)
GLUCOSE SERPL-MCNC: 79 MG/DL (ref 70–110)
GLUCOSE UR QL STRIP: NEGATIVE
HCT VFR BLD AUTO: 38.1 % (ref 37–48.5)
HGB BLD-MCNC: 12.8 G/DL (ref 12–16)
HGB UR QL STRIP: ABNORMAL
HYALINE CASTS #/AREA URNS LPF: 5 /LPF
IMM GRANULOCYTES # BLD AUTO: 0.03 K/UL (ref 0–0.04)
IMM GRANULOCYTES NFR BLD AUTO: 0.5 % (ref 0–0.5)
KETONES UR QL STRIP: NEGATIVE
LEUKOCYTE ESTERASE UR QL STRIP: ABNORMAL
LYMPHOCYTES # BLD AUTO: 1 K/UL (ref 1–4.8)
LYMPHOCYTES NFR BLD: 15.3 % (ref 18–48)
MAGNESIUM SERPL-MCNC: 1.6 MG/DL (ref 1.6–2.6)
MCH RBC QN AUTO: 30.1 PG (ref 27–31)
MCHC RBC AUTO-ENTMCNC: 33.6 G/DL (ref 32–36)
MCV RBC AUTO: 90 FL (ref 82–98)
METHADONE UR QL SCN>300 NG/ML: NEGATIVE
MICROSCOPIC COMMENT: ABNORMAL
MONOCYTES # BLD AUTO: 0.6 K/UL (ref 0.3–1)
MONOCYTES NFR BLD: 9 % (ref 4–15)
NEUTROPHILS # BLD AUTO: 4.9 K/UL (ref 1.8–7.7)
NEUTROPHILS NFR BLD: 74.5 % (ref 38–73)
NITRITE UR QL STRIP: NEGATIVE
NRBC BLD-RTO: 0 /100 WBC
OPIATES UR QL SCN: NEGATIVE
PCP UR QL SCN>25 NG/ML: NEGATIVE
PH UR STRIP: 7 [PH] (ref 5–8)
PHOSPHATE SERPL-MCNC: 4.1 MG/DL (ref 2.7–4.5)
PLATELET # BLD AUTO: 152 K/UL (ref 150–450)
PMV BLD AUTO: 12.4 FL (ref 9.2–12.9)
POTASSIUM SERPL-SCNC: 3.2 MMOL/L (ref 3.5–5.1)
POTASSIUM SERPL-SCNC: 3.2 MMOL/L (ref 3.5–5.1)
PROT SERPL-MCNC: 7 G/DL (ref 6–8.4)
PROT UR QL STRIP: NEGATIVE
RBC # BLD AUTO: 4.25 M/UL (ref 4–5.4)
RBC #/AREA URNS HPF: 2 /HPF (ref 0–4)
SODIUM SERPL-SCNC: 139 MMOL/L (ref 136–145)
SODIUM SERPL-SCNC: 142 MMOL/L (ref 136–145)
SP GR UR STRIP: 1.01 (ref 1–1.03)
TOXICOLOGY INFORMATION: ABNORMAL
TROPONIN I SERPL DL<=0.01 NG/ML-MCNC: 0.02 NG/ML (ref 0–0.03)
TROPONIN I SERPL DL<=0.01 NG/ML-MCNC: <0.006 NG/ML (ref 0–0.03)
TSH SERPL DL<=0.005 MIU/L-ACNC: 2.02 UIU/ML (ref 0.4–4)
URN SPEC COLLECT METH UR: ABNORMAL
UROBILINOGEN UR STRIP-ACNC: NEGATIVE EU/DL
WBC # BLD AUTO: 6.59 K/UL (ref 3.9–12.7)
WBC #/AREA URNS HPF: 10 /HPF (ref 0–5)

## 2025-02-27 PROCEDURE — 96361 HYDRATE IV INFUSION ADD-ON: CPT

## 2025-02-27 PROCEDURE — 83880 ASSAY OF NATRIURETIC PEPTIDE: CPT | Performed by: SURGERY

## 2025-02-27 PROCEDURE — 84484 ASSAY OF TROPONIN QUANT: CPT | Mod: 91 | Performed by: SURGERY

## 2025-02-27 PROCEDURE — 82550 ASSAY OF CK (CPK): CPT | Performed by: SURGERY

## 2025-02-27 PROCEDURE — 96374 THER/PROPH/DIAG INJ IV PUSH: CPT

## 2025-02-27 PROCEDURE — 83735 ASSAY OF MAGNESIUM: CPT | Performed by: SURGERY

## 2025-02-27 PROCEDURE — 80053 COMPREHEN METABOLIC PANEL: CPT | Performed by: SURGERY

## 2025-02-27 PROCEDURE — 84443 ASSAY THYROID STIM HORMONE: CPT | Performed by: SURGERY

## 2025-02-27 PROCEDURE — 83970 ASSAY OF PARATHORMONE: CPT | Performed by: SURGERY

## 2025-02-27 PROCEDURE — 85025 COMPLETE CBC W/AUTO DIFF WBC: CPT | Performed by: SURGERY

## 2025-02-27 PROCEDURE — 80307 DRUG TEST PRSMV CHEM ANLYZR: CPT | Performed by: SURGERY

## 2025-02-27 PROCEDURE — 36415 COLL VENOUS BLD VENIPUNCTURE: CPT | Performed by: SURGERY

## 2025-02-27 PROCEDURE — 99285 EMERGENCY DEPT VISIT HI MDM: CPT | Mod: 25

## 2025-02-27 PROCEDURE — 84100 ASSAY OF PHOSPHORUS: CPT | Performed by: SURGERY

## 2025-02-27 PROCEDURE — 80048 BASIC METABOLIC PNL TOTAL CA: CPT | Mod: XB | Performed by: SURGERY

## 2025-02-27 PROCEDURE — 82140 ASSAY OF AMMONIA: CPT | Performed by: SURGERY

## 2025-02-27 PROCEDURE — 25000003 PHARM REV CODE 250: Performed by: SURGERY

## 2025-02-27 PROCEDURE — 93005 ELECTROCARDIOGRAM TRACING: CPT

## 2025-02-27 PROCEDURE — 81000 URINALYSIS NONAUTO W/SCOPE: CPT | Mod: 59 | Performed by: SURGERY

## 2025-02-27 PROCEDURE — 63600175 PHARM REV CODE 636 W HCPCS: Performed by: SURGERY

## 2025-02-27 PROCEDURE — 93010 ELECTROCARDIOGRAM REPORT: CPT | Mod: ,,, | Performed by: INTERNAL MEDICINE

## 2025-02-27 RX ORDER — CEFTRIAXONE 2 G/1
2 INJECTION, POWDER, FOR SOLUTION INTRAMUSCULAR; INTRAVENOUS
Status: COMPLETED | OUTPATIENT
Start: 2025-02-27 | End: 2025-02-27

## 2025-02-27 RX ORDER — NITROFURANTOIN 25; 75 MG/1; MG/1
100 CAPSULE ORAL 2 TIMES DAILY
Qty: 14 CAPSULE | Refills: 0 | Status: SHIPPED | OUTPATIENT
Start: 2025-02-27 | End: 2025-03-06

## 2025-02-27 RX ADMIN — SODIUM CHLORIDE 1000 ML: 9 INJECTION, SOLUTION INTRAVENOUS at 02:02

## 2025-02-27 RX ADMIN — CEFTRIAXONE SODIUM 2 G: 2 INJECTION, POWDER, FOR SOLUTION INTRAMUSCULAR; INTRAVENOUS at 01:02

## 2025-02-27 RX ADMIN — SODIUM CHLORIDE 1000 ML: 9 INJECTION, SOLUTION INTRAVENOUS at 12:02

## 2025-02-27 NOTE — ED PROVIDER NOTES
Encounter Date: 2025       History     Chief Complaint   Patient presents with    Hypotension     Patient to ED due c/o hypotension. Also with reports of increased confusion for the past month that comes and goes.      History of Present Illness  Rachel Leal is a 59 y.o. female that presents with dizziness at Pt  The patient has been at the local physical therapy for vertigo maneuvers  Patient felt dizzy today & were with reported hypotension at physical therapy  Patient presents today stating she is feeling a little bit better, HX of dizziness  Hypotensive at physical therapy but not hypotensive in the ER this evening  Normal neuro exam, normal vision, normal motor evaluation in the ER now    The history is provided by the patient.     Review of patient's allergies indicates:   Allergen Reactions    Neupro [rotigotine] Dermatitis     Past Medical History:   Diagnosis Date    Cancer     breast stage iv, remission     Fatty liver     Hereditary hemochromatosis 2015    History of TMJ syndrome     Hyperlipidemia     Hypertension     Hypoglycemia     Osteoarthritis     Parkinson disease     Torn meniscus      Past Surgical History:   Procedure Laterality Date    BREAST SURGERY      augmentation     SECTION      COLONOSCOPY      EXCISION OF CONDYLOMA N/A 2019    Procedure: EXCISION OF ANAL CANAL CONDYLOMA;  Surgeon: Yunior Starks MD;  Location: [facility];  Service: Colon and Rectal;  Laterality: N/A;    EYE SURGERY      FOOT SURGERY Right 06/15/2022    FULGURATION OF ANAL CONDYLOMA N/A 2019    Procedure: FULGURATION OF ANAL CANAL CONDYLOMA;  Surgeon: Yunior Starks MD;  Location: [facility];  Service: Colon and Rectal;  Laterality: N/A;    HAND SURGERY      HYSTERECTOMY      [facility] BSO    LIVER BIOPSY  2017    Mild steatosis, macrovesicular 20% and microvesicular 10%. Mild to moderate siderosis, 2+, within hepatocytes. No fibrosis    MASTECTOMY Left     TONSILLECTOMY       Family  History   Problem Relation Name Age of Onset    Colon cancer Mother      Diabetes Father      Heart disease Father      Hemochromatosis Brother      Cirrhosis Brother          hemochromotosis    Breast cancer Neg Hx      Ovarian cancer Neg Hx       Social History[1]  Review of Systems   Constitutional:  Positive for fatigue.   HENT: Negative.     Eyes: Negative.    Respiratory: Negative.     Cardiovascular: Negative.    Gastrointestinal: Negative.    Genitourinary: Negative.    Musculoskeletal: Negative.    Skin: Negative.    Neurological:  Positive for dizziness.   Psychiatric/Behavioral: Negative.         Physical Exam     Initial Vitals [02/27/25 1203]   BP Pulse Resp Temp SpO2   133/69 77 17 98.5 °F (36.9 °C) 98 %      MAP       --         Physical Exam    Nursing note and vitals reviewed.  Constitutional: She appears well-developed.   HENT:   Head: Normocephalic and atraumatic.   Right Ear: External ear normal.   Left Ear: External ear normal.   Nose: Nose normal. Mouth/Throat: Oropharynx is clear and moist.   Eyes: Conjunctivae and EOM are normal. Pupils are equal, round, and reactive to light.   Neck: Neck supple. No JVD present.   Normal range of motion.  Cardiovascular:  Normal rate and regular rhythm.           Pulmonary/Chest: No respiratory distress. She has no wheezes. She has no rhonchi. She has no rales. She exhibits no tenderness.   Abdominal: Abdomen is soft. Bowel sounds are normal. She exhibits no distension. There is no abdominal tenderness. There is no rebound.   Musculoskeletal:         General: Normal range of motion.      Cervical back: Normal range of motion and neck supple.     Neurological: She is alert and oriented to person, place, and time. She has normal strength and normal reflexes.   Skin: Skin is warm and dry.         ED Course   Procedures  Labs Reviewed   COMPREHENSIVE METABOLIC PANEL - Abnormal       Result Value    Sodium 139      Potassium 3.2 (*)     Chloride 98      CO2 31  (*)     Glucose 111 (*)     BUN 19      Creatinine 1.3      Calcium 12.3 (*)     Total Protein 7.0      Albumin 3.7      Total Bilirubin 0.4      Alkaline Phosphatase 178 (*)     AST 8 (*)     ALT <5 (*)     eGFR 47 (*)     Anion Gap 10      Narrative:     Calcium critical result(s) called and verbal readback obtained from   Harjit Pedro MD.  by GANESH 02/27/2025 13:22   CBC W/ AUTO DIFFERENTIAL - Abnormal    WBC 6.59      RBC 4.25      Hemoglobin 12.8      Hematocrit 38.1      MCV 90      MCH 30.1      MCHC 33.6      RDW 12.4      Platelets 152      MPV 12.4      Immature Granulocytes 0.5      Gran # (ANC) 4.9      Immature Grans (Abs) 0.03      Lymph # 1.0      Mono # 0.6      Eos # 0.0      Baso # 0.01      nRBC 0      Gran % 74.5 (*)     Lymph % 15.3 (*)     Mono % 9.0      Eosinophil % 0.5      Basophil % 0.2      Differential Method Automated     URINALYSIS, REFLEX TO URINE CULTURE - Abnormal    Specimen UA Urine, Clean Catch      Color, UA Yellow      Appearance, UA Clear      pH, UA 7.0      Specific Gravity, UA 1.010      Protein, UA Negative      Glucose, UA Negative      Ketones, UA Negative      Bilirubin (UA) Negative      Occult Blood UA Trace (*)     Nitrite, UA Negative      Urobilinogen, UA Negative      Leukocytes, UA 1+ (*)     Narrative:     Specimen Source->Urine   DRUG SCREEN PANEL, URINE EMERGENCY - Abnormal    Benzodiazepines Presumptive Positive (*)     Methadone metabolites Negative      Cocaine (Metab.) Negative      Opiate Scrn, Ur Negative      Barbiturate Screen, Ur Negative      Amphetamine Screen, Ur Negative      THC Negative      Phencyclidine Negative      Creatinine, Urine 68.2      Toxicology Information SEE COMMENT      Narrative:     Specimen Source->Urine   URINALYSIS MICROSCOPIC - Abnormal    RBC, UA 2      WBC, UA 10 (*)     Bacteria Few (*)     Hyaline Casts, UA 5 (*)     Microscopic Comment SEE COMMENT      Narrative:     Specimen Source->Urine   BASIC METABOLIC PANEL -  Abnormal    Sodium 142      Potassium 3.2 (*)     Chloride 104      CO2 28      Glucose 79      BUN 15      Creatinine 1.0      Calcium 11.0 (*)     Anion Gap 10      eGFR >60     TROPONIN I    Troponin I <0.006     CK    CPK 41     B-TYPE NATRIURETIC PEPTIDE    BNP 11     TSH    TSH 2.017     MAGNESIUM    Magnesium 1.6     PHOSPHORUS    Phosphorus 4.1     AMMONIA    Ammonia 38     PTH, INTACT   TROPONIN I    Troponin I 0.024     PTH, INTACT     EKG Readings: (Independently Interpreted)   No STEMI  Normal sinus rhythm  No ectopy  Normal conduction  Normal ST segments  Normal T-wave  Normal axis  Heart rate in the 70s       Imaging Results              CT Head Without Contrast (Final result)  Result time 02/27/25 13:57:26      Final result by Martina Rodriguez MD (02/27/25 13:57:26)                   Impression:      No acute intracranial findings.    Age-appropriate cerebral volume loss with mild patchy decreased attenuation supratentorial white matter while nonspecific suggestive for chronic ischemic change.    No evidence for acute intracranial hemorrhage.  Clinical correlation and further evaluation as warranted.      Electronically signed by: Martina Rodriguez MD  Date:    02/27/2025  Time:    13:57               Narrative:    EXAMINATION:  CT HEAD WITHOUT CONTRAST    CLINICAL HISTORY:  Dizziness, persistent/recurrent, cardiac or vascular cause suspected;    TECHNIQUE:  Multiple sequential 5 mm axial images of the head without contrast.  Coronal and sagittal reformatted imaging from the axial acquisition.    COMPARISON:  12/18/2015    FINDINGS:  There is mild age-appropriate generalized cerebral volume loss.  Compensatory enlargement of the ventricle sulci and cisterns without hydrocephalus.  There is no midline shift or mass effect.  There is mild ill-defined decreased attenuation in the supratentorial white matter while nonspecific suggestive for chronic ischemic change.  There is no evidence for acute  intracranial hemorrhage or sulcal effacement.  There is no midline shift or mass effect.  Prominent vascular calcifications.  Visualized paranasal sinuses and mastoid air cells are clear..                                       X-Ray Chest 1 View (Final result)  Result time 02/27/25 13:35:50      Final result by Martina Rodriguez MD (02/27/25 13:35:50)                   Impression:      No acute abnormality.      Electronically signed by: Martina Rodriguez MD  Date:    02/27/2025  Time:    13:35               Narrative:    EXAMINATION:  XR CHEST 1 VIEW    CLINICAL HISTORY:  fatigue;    TECHNIQUE:  Single frontal view of the chest was performed.    COMPARISON:  None    FINDINGS:  The lungs are clear with normal appearance of pulmonary vasculature. No pleural effusion. No evident pneumothorax.    The cardiac silhouette is normal in size. The hilar and mediastinal contours are unremarkable.    Bones are intact.Surgical clips in the left axilla.  Right-sided Port-A-Cath has its tip in the distal SVC/right atrium.                                       Medications   sodium chloride 0.9% bolus 1,000 mL 1,000 mL (0 mLs Intravenous Stopped 2/27/25 1447)   cefTRIAXone injection 2 g (2 g Intravenous Given 2/27/25 1327)   sodium chloride 0.9% bolus 1,000 mL 1,000 mL (0 mLs Intravenous Stopped 2/27/25 1613)     Medical Decision Making  Differential includes vertigo, dehydration, UTI, orthostatic hypotension  Also includes vasovagal episode, vestibular neuritis, CVA, TIA, electrolyte abnormality    Problems Addressed:  Acute cystitis without hematuria: complicated acute illness or injury  Dehydration: complicated acute illness or injury  Fatigue: complicated acute illness or injury  Hypercalcemia: complicated acute illness or injury    Amount and/or Complexity of Data Reviewed  Independent Historian: spouse  External Data Reviewed: notes.  Labs: ordered. Decision-making details documented in ED Course.  Radiology: ordered and  independent interpretation performed.  ECG/medicine tests: ordered and independent interpretation performed.    ED Management & Risks of Complication, Morbidity, & Mortality:  Patient with a (-) head CT in the emergency room tonight  Feeling 100% better after IV fluids in the emergency room tonight  Normal sinus rhythm on EKG with a clear chest x-ray today  UTI on UA with 2+ leukocytes, Rx of Macrobid on discharge  Patient's calcium has greater than 12 on ER evaluation today  Patient recently stopped a breast cancer Rx after bone fracture  Patient feeling better after IV fluids, repeat calcium lower to 11  Patient will need evaluation for hypercalcemia PTH ordered  I made the patient has a appointment 11:00 a.m. tomorrow  PCP Will follow-up lab results with further hypercalcemia eval    Clinical Impression:  Final diagnoses:  [R53.83] Fatigue  [E86.0] Dehydration (Primary)  [E83.52] Hypercalcemia  [N30.00] Acute cystitis without hematuria          ED Disposition Condition    Discharge Stable          ED Prescriptions       Medication Sig Dispense Start Date End Date Auth. Provider    nitrofurantoin, macrocrystal-monohydrate, (MACROBID) 100 MG capsule Take 1 capsule (100 mg total) by mouth 2 (two) times daily. for 7 days 14 capsule 2025 3/6/2025 Harjit Pedro MD          Follow-up Information       Follow up With Specialties Details Why Contact Info    Cata Leal MD Internal Medicine Go in 1 day 11 am [address] [place] [id number]  [phone]                 [1]   Social History  Tobacco Use    Smoking status: Former     Current packs/day: 0.00     Types: Cigarettes     Quit date: 2013     Years since quittin.4    Smokeless tobacco: Never   Substance Use Topics    Alcohol use: Yes     Comment: drinking 6 peers per month, no daily use     Drug use: No        Harjit Pedro MD  25 0525

## 2025-02-28 ENCOUNTER — OFFICE VISIT (OUTPATIENT)
Dept: INTERNAL MEDICINE | Facility: CLINIC | Age: 60
End: 2025-02-28
Payer: COMMERCIAL

## 2025-02-28 ENCOUNTER — TELEPHONE (OUTPATIENT)
Facility: OTHER | Age: 60
End: 2025-02-28
Payer: COMMERCIAL

## 2025-02-28 VITALS
BODY MASS INDEX: 27.02 KG/M2 | SYSTOLIC BLOOD PRESSURE: 125 MMHG | DIASTOLIC BLOOD PRESSURE: 72 MMHG | WEIGHT: 146.81 LBS | OXYGEN SATURATION: 99 % | RESPIRATION RATE: 18 BRPM | HEART RATE: 78 BPM | HEIGHT: 62 IN

## 2025-02-28 DIAGNOSIS — E83.52 HYPERCALCEMIA: Primary | ICD-10-CM

## 2025-02-28 LAB — PTH-INTACT SERPL-MCNC: <5 PG/ML (ref 9–77)

## 2025-02-28 PROCEDURE — 99999 PR PBB SHADOW E&M-EST. PATIENT-LVL IV: CPT | Mod: PBBFAC,,, | Performed by: FAMILY MEDICINE

## 2025-02-28 NOTE — PROGRESS NOTES
History of Present Illness    CHIEF COMPLAINT:  Patient presents today for follow up of elevated calcium levels and dizziness.    HISTORY OF PRESENT ILLNESS:  She reports experiencing dizziness and weakness for the past few weeks, with severe dizziness yesterday prompting an ER visit. She experiences blood pressure fluctuations during physical therapy, with initial elevation followed by a drop within 20 minutes. Her home blood pressure yesterday was 158/78.    MEDICAL HISTORY:  She has stage 4 breast cancer and Parkinson's disease. She has a history of multiple fractures including five fractures in her feet bilaterally. She recently sustained a leg fracture on October 23rd requiring surgical intervention on November 11th, with delayed diagnosis between injury and treatment.    MEDICATIONS:  She receives XGIVA shots quarterly for the past 11 years. She takes Vitamin D supplements but is unsure of dosage. She was recently prescribed Amlodipine but has not initiated therapy.    LABS:  Recent labs show elevated calcium level of 12 and low parathyroid hormone. Bone scan was performed in August.      ROS:  General: -fever, -chills, -fatigue, -weight gain, -weight loss  Eyes: -vision changes, -redness, -discharge  ENT: -ear pain, -nasal congestion, -sore throat  Cardiovascular: -chest pain, -palpitations, -lower extremity edema  Respiratory: -cough, -shortness of breath  Gastrointestinal: -abdominal pain, -nausea, -vomiting, -diarrhea, -constipation, -blood in stool  Genitourinary: -dysuria, -hematuria, -frequency  Musculoskeletal: -joint pain, -muscle pain  Skin: -rash, -lesion  Neurological: -headache, +dizziness, -numbness, -tingling, +weakness  Psychiatric: -anxiety, -depression, -sleep difficulty       Physical Exam    General: No acute distress. Well-developed. Well-nourished.  Eyes: EOMI. Sclerae anicteric.  HENT: Normocephalic. Atraumatic. Nares patent. Moist oral mucosa.  Ears: Bilateral TMs clear. Bilateral EACs  clear.  Cardiovascular: Regular rate. Regular rhythm. No murmurs. No rubs. No gallops. Normal S1, S2.  Respiratory: Normal respiratory effort. Clear to auscultation bilaterally. No rales. No rhonchi. No wheezing.  Abdomen: Soft. Non-tender. Non-distended. Normoactive bowel sounds.  Musculoskeletal: No  obvious deformity.  Extremities: No lower extremity edema.  Neurological: Alert & oriented x3. No slurred speech. Normal gait.  Psychiatric: Normal mood. Normal affect. Good insight. Good judgment.  Skin: Warm. Dry. No rash.       Assessment & Plan    IMPRESSION:  - Elevated calcium (12) likely due to dehydration; not consistent with typical parathyroid hormone-related hypercalcemia  - Blood pressure improved (158/78 yesterday, 125/72 today)  - Ruled out UTI despite initial suspicion (only 10 white cells in urine)  - CT brain negative for lytic lesions; multiple myeloma less likely  - Mildly elevated Alk phos, possibly related to bone issues/fractures  - Consider bone scan if calcium remains elevated  - History of stage 4 breast cancer, XGIVA treatment, and multiple fractures complicating clinical picture    STAGE 4 BREAST CANCER:  - Monitored the patient's history of stage 4 breast cancer.  - Noted that the patient receives XGIVA shots 4 times per year for 11 years.  - Evaluated recent scans and found no new lesions.  - Reviewed CT results and confirmed no lytic lesions in the scalp.  - Scheduled follow-up visits with oncologist Dr. Krause every 3 months.  - Ordered annual bone scan.    PARKINSON'S DISEASE:  - Confirmed the patient's diagnosis of Parkinson's disease.  - Scheduled a virtual appointment with a specialist on the 3rd.    FOOT FRACTURES:  - Noted the patient's report of 5 fractures in the right foot.  - Noted the patient's report of 1 fracture in the left foot.    ELEVATED CALCIUM:  - Explained the potential link between vitamin D supplementation and elevated calcium levels.  - Discussed the importance  of hydration in managing calcium levels.  - Instructed the patient to discontinue vitamin D supplements for 1 week.  - Ordered repeat labs for Monday, including calcium and vitamin D levels.  - Noted the patient's calcium level was 12 in the ER.  - Discussed possible causes of sudden rise in calcium levels.  - Considered ordering a bone scan if calcium levels don't normalize.  - Advised the patient to stay hydrated over the weekend.  - Noted low parathyroid hormone levels.  - Noted the patient's report of feeling dizzy and weak for a few weeks.  - Observed improvement in the patient's condition compared to yesterday.  - Patient to stay hydrated over the weekend.  - Patient to avoid sun exposure.    MEDICATIONS/SUPPLEMENTS:  - Discontinued Amlodipine prescription (patient had not started).    PRESUMED UTI:  - Instructed the patient to continue antibiotic treatment (for presumed UTI) for 3 days, then discontinue.  - Noted the patient's report of being told they had a UTI.  - Disagreed with the UTI diagnosis based on urine test results.  - Advised the patient to complete the prescribed antibiotic course.    FOLLOW UP:  - Scheduled follow-up on Monday for repeat labs at the hospital front entrance.  - Instructed the patient to contact the office for lab results.  - Scheduled follow-up visit on the 11th with Dr. Leal.       Consider repeat bone scan if calcium is still elevated.    CC chart to Dr Leal

## 2025-02-28 NOTE — PROGRESS NOTES
Patient seen in the ED on 2/27/2025 for hypotension.  Patient's call escalated to post ED text tracker.  Patient outreached on two separate occasions, but unable to reach.  Encounter closed at this time.

## 2025-03-03 ENCOUNTER — OFFICE VISIT (OUTPATIENT)
Facility: CLINIC | Age: 60
End: 2025-03-03
Payer: COMMERCIAL

## 2025-03-03 DIAGNOSIS — G62.9 NEUROPATHY: ICD-10-CM

## 2025-03-03 DIAGNOSIS — G20.A2 PARKINSON'S DISEASE WITHOUT DYSKINESIA, WITH FLUCTUATING MANIFESTATIONS: ICD-10-CM

## 2025-03-03 DIAGNOSIS — R79.89 LOW VITAMIN B12 LEVEL: ICD-10-CM

## 2025-03-03 DIAGNOSIS — F80.81 STUTTERING: Primary | ICD-10-CM

## 2025-03-03 RX ORDER — LEVODOPA AND CARBIDOPA 145; 36.25 MG/1; MG/1
3 CAPSULE, EXTENDED RELEASE ORAL NIGHTLY
Qty: 90 CAPSULE | Refills: 12 | Status: SHIPPED | OUTPATIENT
Start: 2025-03-03

## 2025-03-03 RX ORDER — CARBIDOPA AND LEVODOPA 25; 100 MG/1; MG/1
2 TABLET ORAL 4 TIMES DAILY
Qty: 720 TABLET | Refills: 9 | Status: SHIPPED | OUTPATIENT
Start: 2025-03-03

## 2025-03-03 NOTE — PROGRESS NOTES
The patient location is: HOME  The chief complaint leading to visit is: iPD  1. Stuttering  Ambulatory referral/consult to Speech Therapy      2. Low vitamin B12 level  Vitamin B12 Deficiency Panel      3. Parkinson's disease without dyskinesia, with fluctuating manifestations        4. Neuropathy          Visit type: Virtual visit with synchronous audio and video    Face to Face time with patient: 20mins  30 minutes of total time spent on the encounter, which includes face to face time and non-face to face time preparing to see the patient (eg, review of tests), Obtaining and/or reviewing separately obtained history, Documenting clinical information in the electronic or other health record, Independently interpreting results (not separately reported) and communicating results to the patient/family/caregiver, or Care coordination (not separately reported).     Each patient to whom he or she provides medical services by telemedicine is:  (1) informed of the relationship between the physician and patient and the respective role of any other health care provider with respect to management of the patient; and (2) notified that he or she may decline to receive medical services by telemedicine and may withdraw from such care at any time.        [facility]    PCP/Referring Provider: Anu Gresham MD  [id number] [place] [address]  Date of Service: 3/3/2025    Chief Complaint: PD    Interval Hx  Since last visit,   Had a stress fracture of leg and rehabbing  Using a cane    Started carbidopa/levodopa 25/100mg 2 tabs Q4 hours  ONtime is 3.5 hours  Offtime- tremors and slowness  Continues amantadine 100mg PO TID  No hallucinations but she is concerned to oversode Ldopa as her mom has halluncinations    Feet burning consistently and affect walking  B12 found low and started oral B12 daily    Time is 4 hours after which she drags a foot and mild tremors  She is more concerned with  "tremors  Having night-time offs- cannot sleep    No dizziness, nausea, halluciantions  Walking better  Hand dexterity is better    No dyskinesias    Falls: none  Assist Device: none  Can walk 300 feet before she tires    Mother on hospice for similar dz    Med HX  Started ropinirole 0.25mg PO TID and felt better but at 0.5mg PO TID felt worse, was feeling weak and stopped    "PriorHPI: Rachel Leal is a R HANDED 59 y.o. female with a medical issues significant for hemochromatosis, HTN, HL, Breast Cancer s/p chemotherapy, who presents with PDism. Notes her PDism began at age 53. R sided rigidity and arm stiffness began and progressed. "Just like mom." At this point has a moderate R hand resting tremor and     Started carbidopa/levodopa 25/100mg 1 tab daily 2017  Currently on Amantadine 100mg Po TID. This helps her tremor. 8/2/PM  No cognitive issues or leg swelling.  Has 4 hour ONtimes with Amantadine  When she was on both carbidopa/levodopa and amantadine she "felt worse"    R foot inversions when OFF  No orthostasis  No gambling tesnencies    [place] descent  Mother and grandmother had PDism"    Review of Systems:   Review of Systems   Constitutional: Negative for fever.   HENT: Negative for congestion.    Eyes: Negative for double vision.   Respiratory: Negative for cough and shortness of breath.    Cardiovascular: Negative for chest pain and leg swelling.   Gastrointestinal: Negative for nausea.   Genitourinary: Negative for dysuria.   Musculoskeletal: Negative for falls.   Skin: Negative for rash.   Neurological: Positive for tremors and speech change. Negative for headaches.   Psychiatric/Behavioral: Negative for depression.         Current Medications:  Outpatient Encounter Medications as of 3/3/2025   Medication Sig Dispense Refill    anastrozole (ARIMIDEX) 1 mg Tab Take 1 mg by mouth once daily.      atorvastatin (LIPITOR) 40 MG tablet Take 1 tablet (40 mg total) by mouth once daily. 90 tablet 0    " diazePAM (VALIUM) 2 MG tablet Take 2 tablets (4 mg total) by mouth nightly as needed. AS NEEDED FOR INSOMNIA 60 tablet 0    docusate sodium (STOOL SOFTENER ORAL) Take by mouth.      esomeprazole (NEXIUM) 40 MG capsule Take 1 capsule (40 mg total) by mouth before breakfast. 90 capsule 2    losartan (COZAAR) 50 MG tablet Take 1 tablet by mouth every morning.      meloxicam (MOBIC) 15 MG tablet Take 1 tablet (15 mg total) by mouth once daily.      nitrofurantoin, macrocrystal-monohydrate, (MACROBID) 100 MG capsule Take 1 capsule (100 mg total) by mouth 2 (two) times daily. for 7 days 14 capsule 0    oxybutynin (DITROPAN-XL) 5 MG TR24 Take 5 mg by mouth once daily.      venlafaxine (EFFEXOR-XR) 75 MG 24 hr capsule Take 75 mg by mouth once daily.       [DISCONTINUED] carbidopa-levodopa (RYTARY) 36. mg CpSR Take 3 Capfuls by mouth every evening. 90 capsule 12    [DISCONTINUED] carbidopa-levodopa  mg (SINEMET)  mg per tablet TAKE 2 TABLETS BY MOUTH 4 TIMES DAILY. 720 tablet 9    amantadine HCL (SYMMETREL) 100 mg capsule TAKE 1 CAPSULE BY MOUTH THREE TIMES A  capsule 0    carbidopa-levodopa (RYTARY) 36. mg CpSR Take 3 capsules by mouth every evening. 90 capsule 12    carbidopa-levodopa  mg (SINEMET)  mg per tablet Take 2 tablets by mouth 4 (four) times daily. 720 tablet 9    vitamin D 1000 units Tab Take 1,000 mg by mouth once daily.  (Patient not taking: Reported on 3/3/2025)      [DISCONTINUED] amLODIPine (NORVASC) 5 MG tablet Take 1 tablet (5 mg total) by mouth once daily. 90 tablet 3     No facility-administered encounter medications on file as of 3/3/2025.       Past Medical History:  Patient Active Problem List   Diagnosis    Hyperlipidemia    Hx of breast cancer    Neuropathy    Hereditary hemochromatosis    Anxiety    Parkinson disease    Fatty liver    Obesity (BMI 30-39.9)    Condyloma    Benign essential HTN    Right leg numbness    S/P mastectomy    History of cancer  metastatic to bone    Balance disorder    Functional gait abnormality    Secondary malignant neoplasm of bone       Past Surgical History:  Past Surgical History:   Procedure Laterality Date    BREAST SURGERY      augmentation     SECTION      COLONOSCOPY      EXCISION OF CONDYLOMA N/A 2019    Procedure: EXCISION OF ANAL CANAL CONDYLOMA;  Surgeon: Yunior Starks MD;  Location: [facility];  Service: Colon and Rectal;  Laterality: N/A;    EYE SURGERY      FOOT SURGERY Right 06/15/2022    FULGURATION OF ANAL CONDYLOMA N/A 2019    Procedure: FULGURATION OF ANAL CANAL CONDYLOMA;  Surgeon: Yunior Starks MD;  Location: [facility] OR;  Service: Colon and Rectal;  Laterality: N/A;    HAND SURGERY      HYSTERECTOMY      [facility] BSO    LIVER BIOPSY  2017    Mild steatosis, macrovesicular 20% and microvesicular 10%. Mild to moderate siderosis, 2+, within hepatocytes. No fibrosis    MASTECTOMY Left     TONSILLECTOMY         Current Living Situation: home    Social:  Social History     Socioeconomic History    Marital status:    Tobacco Use    Smoking status: Former     Current packs/day: 0.00     Types: Cigarettes     Quit date: 2013     Years since quittin.4    Smokeless tobacco: Never   Substance and Sexual Activity    Alcohol use: Yes     Comment: drinking 6 peers per month, no daily use     Drug use: No    Sexual activity: Yes     Partners: Male     Birth control/protection: Surgical     Comment: .     Social Drivers of Health     Financial Resource Strain: Medium Risk (3/3/2025)    Overall Financial Resource Strain (CARDIA)     Difficulty of Paying Living Expenses: Somewhat hard   Food Insecurity: No Food Insecurity (3/3/2025)    Hunger Vital Sign     Worried About Running Out of Food in the Last Year: Never true     Ran Out of Food in the Last Year: Never true   Transportation Needs: No Transportation Needs (3/3/2025)    PRAPARE - Transportation     Lack of Transportation (Medical): No      Lack of Transportation (Non-Medical): No   Physical Activity: Inactive (3/3/2025)    Exercise Vital Sign     Days of Exercise per Week: 0 days     Minutes of Exercise per Session: 30 min   Stress: Stress Concern Present (3/3/2025)    [employer] - Occupational Stress Questionnaire     Feeling of Stress : Rather much   Housing Stability: Low Risk  (3/3/2025)    Housing Stability Vital Sign     Unable to Pay for Housing in the Last Year: No     Homeless in the Last Year: No       Family History:  Family History   Problem Relation Name Age of Onset    Colon cancer Mother      Diabetes Father      Heart disease Father      Hemochromatosis Brother      Cirrhosis Brother          hemochromotosis    Breast cancer Neg Hx      Ovarian cancer Neg Hx         PHYSICAL:  LMP 01/17/2012     Physical Exam  Constitutional: Well-developed, well-nourished, appears stated age  Eyes: No scleral icterus  ENT: Moist oral mucosa  Cardiovascular: No lower extremity edema   Respiratory: No labored breathing   Skin: No rash   Hematologic: No bruising    Other: GI/ deferred   Mental status: Alert and oriented to person, place, time, and situation;   follows commands, mildly confused  Speech: normal (not dysarthric), no aphasia  Cranial nerves:            CN II: Pupils mid-position and equal, not tested light or accommodation  CN III, IV, VI: Extraocular movements full, no nystagmus visualized  CN V: Not tested   CN VII: Face strong and symmetric bilaterally   CN VIII: Hearing intact to voice and conversation   CN IX, X: Palate raises midline and symmetric   CN XI: Strong shoulder shrug B/L  CN XII: Tongue appears midline   Motor: Normal bulk by appearance, no drift   Sensory: Not tested    Gait: fairly normal (out of proportion) to arms)  Deep tendon reflexes: Not tested  Movement/Coordination                    Mod hypophonic speech.                     Mod facial masking..                   No tremor with  "rest, posture, kinesis, or intention.   FNF NL  R hand and R foot dystonia mild    L hand tremor mild    ? Finger taps Finger flicks RAE Heel taps   Left 2+ 2+ - 2+   Right 3+ 3+ - 2+     Laboratory Data:  NA    Imaging:  Reported as NL    Assessment//Plan:   Problem List Items Addressed This Visit          Neuro    Neuropathy    Current Assessment & Plan   Burning foot pain affecting balance    B12 low- as started oral supplementation  Suggseted recheck B12  Can try gabapentin up to 300gm QHS         Parkinson disease    Current Assessment & Plan   Parkinsonism, familial autosomal dominant. Mother has prominent BG calcifications - possible Fahr's dz. Rachel is under-dosed at them moment.   Due to OFFtime suggested    Continue Amantadine 100mg PO TID  Suggested switch night time carbidopa/levodopa to 3 caps rytary 145mg for better Ontime    Confused today about med regimen and past plans  Suggested next visit with spouse to avoid confusion  Reports she did a test through "[facility]" however does not have a report to show me- asked her to bring this to her next appt  Had Qs regarding DBS    Unclear why she's taking carbidopa/levodopa 25/100mg 1 tab PO QID  She was on 2 tabs QID prior - suggested increase back to 2 tabs over 2 weeks  Did not start rytary yet at night - suggested again try this      CT scan shows possible calcifications in [place]  She reported this earlier in her mother - concern for Fahr's Dz            Other Visit Diagnoses         Stuttering    -  Primary    Relevant Orders    Ambulatory referral/consult to Speech Therapy      Low vitamin B12 level        Relevant Orders    Vitamin B12 Deficiency Panel            This visit covered ongoing complex medical needs extending over multiple office visits covering multiple chronic issues.      Anu Gresham MD, MS  [facility]  Department of Neurology  Movement Disorders                          "

## 2025-03-03 NOTE — ASSESSMENT & PLAN NOTE
Burning foot pain affecting balance    B12 low- as started oral supplementation  Suggseted recheck B12  Can try gabapentin up to 300gm QHS

## 2025-03-03 NOTE — ASSESSMENT & PLAN NOTE
"Parkinsonism, familial autosomal dominant. Mother has prominent BG calcifications - possible Fahr's dz. Rachel is under-dosed at them moment.   Due to OFFtime suggested    Continue Amantadine 100mg PO TID  Suggested switch night time carbidopa/levodopa to 3 caps rytary 145mg for better Ontime    Confused today about med regimen and past plans  Suggested next visit with spouse to avoid confusion  Reports she did a test through "[facility]" however does not have a report to show me- asked her to bring this to her next appt  Had Qs regarding DBS    Unclear why she's taking carbidopa/levodopa 25/100mg 1 tab PO QID  She was on 2 tabs QID prior - suggested increase back to 2 tabs over 2 weeks  Did not start rytary yet at night - suggested again try this      CT scan shows possible calcifications in [place]  She reported this earlier in her mother - concern for Fahr's Dz    "

## 2025-03-05 LAB
OHS QRS DURATION: 84 MS
OHS QTC CALCULATION: 395 MS

## 2025-03-10 ENCOUNTER — TELEPHONE (OUTPATIENT)
Facility: CLINIC | Age: 60
End: 2025-03-10
Payer: COMMERCIAL

## 2025-03-10 NOTE — TELEPHONE ENCOUNTER
Called PT  today about scheduling an in person 40 min with the provider. PT  picks up. PT has been scheduled on may 27 2pm.

## 2025-03-14 ENCOUNTER — TELEPHONE (OUTPATIENT)
Dept: INTERNAL MEDICINE | Facility: CLINIC | Age: 60
End: 2025-03-14
Payer: COMMERCIAL

## 2025-03-14 NOTE — TELEPHONE ENCOUNTER
Spoke with patient and she states her blood pressure has been running high. States it has been around 150's over 100. Denies symptoms. Patient states she take losartan 50 mg but thinks she takes 100 mg daily.   Offered patient appt with Cardio at [place]. Patient scheduled with Khadijah Jeff NP with Cardio on Monday 03/17/2025 at 10:00am.  Advised patient to report to ER if BP continues to run high and if she experiences any symptoms. Verbalized understanding.

## 2025-03-17 ENCOUNTER — OFFICE VISIT (OUTPATIENT)
Dept: CARDIOLOGY | Facility: CLINIC | Age: 60
End: 2025-03-17
Payer: COMMERCIAL

## 2025-03-17 ENCOUNTER — TELEPHONE (OUTPATIENT)
Facility: CLINIC | Age: 60
End: 2025-03-17
Payer: COMMERCIAL

## 2025-03-17 VITALS
WEIGHT: 143.5 LBS | DIASTOLIC BLOOD PRESSURE: 94 MMHG | HEIGHT: 62 IN | HEART RATE: 75 BPM | BODY MASS INDEX: 26.41 KG/M2 | SYSTOLIC BLOOD PRESSURE: 163 MMHG | OXYGEN SATURATION: 98 % | RESPIRATION RATE: 18 BRPM

## 2025-03-17 DIAGNOSIS — I10 BENIGN ESSENTIAL HTN: Primary | ICD-10-CM

## 2025-03-17 DIAGNOSIS — E78.5 HYPERLIPIDEMIA, UNSPECIFIED HYPERLIPIDEMIA TYPE: ICD-10-CM

## 2025-03-17 PROCEDURE — 99999 PR PBB SHADOW E&M-EST. PATIENT-LVL III: CPT | Mod: PBBFAC,,, | Performed by: NURSE PRACTITIONER

## 2025-03-17 PROCEDURE — 99204 OFFICE O/P NEW MOD 45 MIN: CPT | Mod: S$GLB,,, | Performed by: NURSE PRACTITIONER

## 2025-03-17 PROCEDURE — 4010F ACE/ARB THERAPY RXD/TAKEN: CPT | Mod: CPTII,S$GLB,, | Performed by: NURSE PRACTITIONER

## 2025-03-17 PROCEDURE — 3008F BODY MASS INDEX DOCD: CPT | Mod: CPTII,S$GLB,, | Performed by: NURSE PRACTITIONER

## 2025-03-17 PROCEDURE — 3080F DIAST BP >= 90 MM HG: CPT | Mod: CPTII,S$GLB,, | Performed by: NURSE PRACTITIONER

## 2025-03-17 PROCEDURE — 3077F SYST BP >= 140 MM HG: CPT | Mod: CPTII,S$GLB,, | Performed by: NURSE PRACTITIONER

## 2025-03-17 RX ORDER — AMLODIPINE BESYLATE 2.5 MG/1
2.5 TABLET ORAL DAILY
Qty: 30 TABLET | Refills: 2 | Status: SHIPPED | OUTPATIENT
Start: 2025-03-17

## 2025-03-17 NOTE — PROGRESS NOTES
[facility]    CC: HTN    Patient ID: Rachel Leal is a 60 y.o. female with a past medical history of HTN    HPI  Referred for evalaution elevated BP  /94; takes losartan     Chest pain; goes away on its own   Stays 1/2 minute and self resolves    HLD; on statin     HTN; only takes losartan     Stage 4 breast CA; XGIVA treatment   Parkinsons disease  Hypercalcemia; thought to be related to dehydration    Past Medical History:   Diagnosis Date    Cancer     breast stage iv, remission     Fatty liver     Hereditary hemochromatosis 2015    History of TMJ syndrome     Hyperlipidemia     Hypertension     Hypoglycemia     Osteoarthritis     Parkinson disease     Torn meniscus      Past Surgical History:   Procedure Laterality Date    BREAST SURGERY      augmentation     SECTION      COLONOSCOPY      EXCISION OF CONDYLOMA N/A 2019    Procedure: EXCISION OF ANAL CANAL CONDYLOMA;  Surgeon: Yunior Starks MD;  Location: [facility];  Service: Colon and Rectal;  Laterality: N/A;    EYE SURGERY      FOOT SURGERY Right 06/15/2022    FULGURATION OF ANAL CONDYLOMA N/A 2019    Procedure: FULGURATION OF ANAL CANAL CONDYLOMA;  Surgeon: Yunior Starks MD;  Location: [facility];  Service: Colon and Rectal;  Laterality: N/A;    HAND SURGERY      HYSTERECTOMY      [facility] BSO    LIVER BIOPSY  2017    Mild steatosis, macrovesicular 20% and microvesicular 10%. Mild to moderate siderosis, 2+, within hepatocytes. No fibrosis    MASTECTOMY Left     TONSILLECTOMY       Social History[1]  Family History   Problem Relation Name Age of Onset    Colon cancer Mother      Diabetes Father      Heart disease Father      Hemochromatosis Brother      Cirrhosis Brother          hemochromotosis    Breast cancer Neg Hx      Ovarian cancer Neg Hx         Review of patient's allergies indicates:   Allergen Reactions    Neupro [rotigotine] Dermatitis       Medication List with Changes/Refills   New Medications     AMLODIPINE (NORVASC) 2.5 MG TABLET    Take 1 tablet (2.5 mg total) by mouth once daily.    HYDROCHLOROTHIAZIDE (HYDRODIURIL) 25 MG TABLET    Take 1 tablet (25 mg total) by mouth once daily.   Current Medications    AMANTADINE HCL (SYMMETREL) 100 MG CAPSULE    TAKE 1 CAPSULE BY MOUTH THREE TIMES A DAY    ANASTROZOLE (ARIMIDEX) 1 MG TAB    Take 1 mg by mouth once daily.    ATORVASTATIN (LIPITOR) 40 MG TABLET    Take 1 tablet (40 mg total) by mouth once daily.    CARBIDOPA-LEVODOPA  MG (SINEMET)  MG PER TABLET    Take 2 tablets by mouth 4 (four) times daily.    DIAZEPAM (VALIUM) 2 MG TABLET    Take 2 tablets (4 mg total) by mouth nightly as needed. AS NEEDED FOR INSOMNIA    DOCUSATE SODIUM (STOOL SOFTENER ORAL)    Take by mouth.    LOSARTAN (COZAAR) 50 MG TABLET    Take 1 tablet by mouth every morning.    MELOXICAM (MOBIC) 15 MG TABLET    Take 1 tablet (15 mg total) by mouth once daily.    OXYBUTYNIN (DITROPAN-XL) 5 MG TR24    Take 5 mg by mouth once daily.    VENLAFAXINE (EFFEXOR-XR) 75 MG 24 HR CAPSULE    Take 75 mg by mouth once daily.     VITAMIN D 1000 UNITS TAB    Take 1,000 mg by mouth once daily.   Changed and/or Refilled Medications    Modified Medication Previous Medication    CARBIDOPA-LEVODOPA (RYTARY) 36. MG CPSR carbidopa-levodopa (RYTARY) 36. mg CpSR       Take 3 capsules by mouth 4 (four) times daily.    Take 3 capsules by mouth every evening.    ESOMEPRAZOLE (NEXIUM) 40 MG CAPSULE esomeprazole (NEXIUM) 40 MG capsule       TAKE 1 CAPSULE BY MOUTH BEFORE BREAKFAST.    Take 1 capsule (40 mg total) by mouth before breakfast.           Review of Systems   Constitutional: Negative.   Cardiovascular:  Positive for chest pain. Negative for claudication, cyanosis, dyspnea on exertion, irregular heartbeat, leg swelling, near-syncope, orthopnea, palpitations, paroxysmal nocturnal dyspnea and syncope.   Respiratory: Negative.     Musculoskeletal: Negative.        Vitals:    03/17/25 1026  "  BP: (!) 163/94   Pulse: 75   Resp: 18   SpO2: 98%   Weight: 65.1 kg (143 lb 8.3 oz)   Height: 5' 2" (1.575 m)          Physical Exam  Cardiovascular:      Rate and Rhythm: Normal rate and regular rhythm.      Pulses: Normal pulses.      Heart sounds: Normal heart sounds.   Musculoskeletal:         General: Normal range of motion.   Skin:     General: Skin is warm.   Neurological:      Mental Status: She is alert.           Labs:  Most Recent Data  CBC:   Lab Results   Component Value Date    WBC 7.08 03/23/2025    HGB 11.2 (L) 03/23/2025    HCT 33.4 (L) 03/23/2025     03/23/2025    MCV 87 03/23/2025    RDW 12.5 03/23/2025     BMP:   Lab Results   Component Value Date     03/23/2025    K 2.9 (L) 03/23/2025    CL 96 03/23/2025    CO2 34 (H) 03/23/2025    BUN 14 03/23/2025    CREATININE 1.2 03/23/2025     03/23/2025    CALCIUM 12.8 (HH) 03/23/2025    MG 1.4 (L) 03/23/2025    PHOS 3.0 03/23/2025     LFTS;   Lab Results   Component Value Date    PROT 6.8 03/23/2025    ALBUMIN 3.6 03/23/2025    BILITOT 0.4 03/23/2025    AST 9 (L) 03/23/2025    ALKPHOS 176 (H) 03/23/2025    ALT 5 (L) 03/23/2025     COAGS:   Lab Results   Component Value Date    INR 1.0 12/04/2019     FLP:   Lab Results   Component Value Date    CHOL 153 01/18/2025    HDL 54 01/18/2025    LDLCALC 77.4 01/18/2025    TRIG 108 01/18/2025    CHOLHDL 35.3 01/18/2025     CARDIAC:   Lab Results   Component Value Date    TROPONINI 0.014 03/23/2025    BNP 11 02/27/2025       Assessment/Plan:  Problem List Items Addressed This Visit       Hyperlipidemia    Benign essential HTN - Primary     Chest discomfort is likely related to elevated blood pressures  Start amlodipine 2.5 mg   Check echocardiogram to assess LV function valvular structure    Follow up 3w for reassessment  Total duration of face to face visit time 30 minutes.  Total time spent counseling greater than fifty percent of total visit time.  Counseling included discussion regarding " imaging findings, diagnosis, possibilities, treatment options, risks and benefits.  The patient had many questions regarding the options and long-term effects.    ARMEN Gallo  Cardiology Clinic  [facility]              [1]   Social History  Socioeconomic History    Marital status:    Tobacco Use    Smoking status: Former     Current packs/day: 0.00     Types: Cigarettes     Quit date: 2013     Years since quittin.6    Smokeless tobacco: Never   Substance and Sexual Activity    Alcohol use: Yes     Comment: drinking 6 peers per month, no daily use     Drug use: No    Sexual activity: Yes     Partners: Male     Birth control/protection: Surgical     Comment: .     Social Drivers of Health     Financial Resource Strain: Medium Risk (3/3/2025)    Overall Financial Resource Strain (CARDIA)     Difficulty of Paying Living Expenses: Somewhat hard   Food Insecurity: No Food Insecurity (3/3/2025)    Hunger Vital Sign     Worried About Running Out of Food in the Last Year: Never true     Ran Out of Food in the Last Year: Never true   Transportation Needs: No Transportation Needs (3/3/2025)    PRAPARE - Transportation     Lack of Transportation (Medical): No     Lack of Transportation (Non-Medical): No   Physical Activity: Inactive (3/3/2025)    Exercise Vital Sign     Days of Exercise per Week: 0 days     Minutes of Exercise per Session: 30 min   Stress: Stress Concern Present (3/3/2025)    [employer] - Occupational Stress Questionnaire     Feeling of Stress : Rather much   Housing Stability: Low Risk  (3/3/2025)    Housing Stability Vital Sign     Unable to Pay for Housing in the Last Year: No     Homeless in the Last Year: No

## 2025-03-17 NOTE — TELEPHONE ENCOUNTER
I think if she feels better or an infection is found keeps the extra rytary, otherwise may go back to her old dose. Can ser if we can expedite her followup. Taye can we move up her followup to next avail?

## 2025-03-17 NOTE — TELEPHONE ENCOUNTER
----- Message from Kylah sent at 3/14/2025  4:27 PM CDT -----  Regarding: Donovan  Pt is calling to speak to someone in the office to discuss symptoms they are having. Pt is asking for a call back today. Please call to advise. Thanks. Symptoms: dizziness, not walking normal, shaky, speech is off How long has patient had these symptoms: 1 week Additional Information: pt states that she needs to speak with nurse regarding her medication pls advise

## 2025-03-17 NOTE — TELEPHONE ENCOUNTER
"Returned call to pt this am regarding medication confusion. LOV 3/3/2025    Pt called Friday reporting:  "dizziness, not walking normal, shaky, speech is off. How long has patient had these symptoms: 1 week :  This am she reports she can't see well.  "She went up on my medicine. I was feeling fine before that.  I'm going for a urine test today to see if I have an infection. "      Taking 2 tabs every 4 hours and Rytary at bedtime.      "I wanted to talk to her about the brain surgery"    "Is all this commotion in my body about that one pill?"    Reports she started feeling better yesterday.     Reports she is going to the cardiologist this am to clear her heart and get a urine culture done at [facility] in [place].    Informed her I would relay her message to Dr. Gresham to advise.           "

## 2025-03-18 ENCOUNTER — PATIENT MESSAGE (OUTPATIENT)
Dept: CARDIOLOGY | Facility: CLINIC | Age: 60
End: 2025-03-18
Payer: COMMERCIAL

## 2025-03-18 ENCOUNTER — PATIENT MESSAGE (OUTPATIENT)
Facility: CLINIC | Age: 60
End: 2025-03-18
Payer: COMMERCIAL

## 2025-03-18 NOTE — TELEPHONE ENCOUNTER
Received note from pt's caregiver that she is taking:    Amantadine 100 mg tid 8 am 2 pm 8 pm    Carbidopa levodopa  mg  two tabs by mouth 4 times day   8 am 12 pm 4 pm 8 pm    Rytary 36. mg 3 caps by mouth every evening at 9 pm    She wants to clarify the times are correct

## 2025-03-20 ENCOUNTER — TELEPHONE (OUTPATIENT)
Facility: CLINIC | Age: 60
End: 2025-03-20
Payer: COMMERCIAL

## 2025-03-20 NOTE — TELEPHONE ENCOUNTER
"Called pt for follow up to last week's symptoms   Reports dizziness is better than last week. Reports walking is fine now.  The shaking has improved.     Memory is bad. "I don't remember for two minutes.  I have to keep checking things I've done to see if I did it right."    "My blood pressure has been high, but they're checking into all of that"  Saw cardiology on 3/17/25.      "I'm having an ultrasound done of my heart."    I just went to the eye doctor last week.  She said she didn't see anything. Had a regular vision exam."   "

## 2025-03-21 ENCOUNTER — DOCUMENTATION ONLY (OUTPATIENT)
Facility: CLINIC | Age: 60
End: 2025-03-21
Payer: COMMERCIAL

## 2025-03-23 ENCOUNTER — E-CONSULT (OUTPATIENT)
Facility: CLINIC | Age: 60
End: 2025-03-23
Payer: COMMERCIAL

## 2025-03-23 ENCOUNTER — HOSPITAL ENCOUNTER (EMERGENCY)
Facility: HOSPITAL | Age: 60
Discharge: HOME | End: 2025-03-23
Attending: SURGERY
Payer: COMMERCIAL

## 2025-03-23 VITALS
TEMPERATURE: 98 F | RESPIRATION RATE: 17 BRPM | WEIGHT: 141.19 LBS | OXYGEN SATURATION: 95 % | BODY MASS INDEX: 25.98 KG/M2 | SYSTOLIC BLOOD PRESSURE: 158 MMHG | HEART RATE: 69 BPM | HEIGHT: 62 IN | DIASTOLIC BLOOD PRESSURE: 82 MMHG

## 2025-03-23 DIAGNOSIS — R41.0 CONFUSION: ICD-10-CM

## 2025-03-23 DIAGNOSIS — T50.905A ADVERSE EFFECT OF DRUG, INITIAL ENCOUNTER: ICD-10-CM

## 2025-03-23 DIAGNOSIS — R41.82 ALTERED MENTAL STATUS: Primary | ICD-10-CM

## 2025-03-23 DIAGNOSIS — R44.3 HALLUCINATIONS: ICD-10-CM

## 2025-03-23 DIAGNOSIS — G20.B2 PARKINSON'S DISEASE WITH DYSKINESIA AND FLUCTUATING MANIFESTATIONS: Primary | ICD-10-CM

## 2025-03-23 LAB
ABSOLUTE EOSINOPHIL (OHS): 0.06 K/UL
ABSOLUTE MONOCYTE (OHS): 0.66 K/UL (ref 0.3–1)
ABSOLUTE NEUTROPHIL COUNT (OHS): 5.12 K/UL (ref 1.8–7.7)
ALBUMIN SERPL BCP-MCNC: 3.6 G/DL (ref 3.5–5.2)
ALP SERPL-CCNC: 176 UNIT/L (ref 40–150)
ALT SERPL W/O P-5'-P-CCNC: 5 UNIT/L (ref 10–44)
AMPHET UR QL SCN: NEGATIVE
ANION GAP (OHS): 8 MMOL/L (ref 8–16)
APAP SERPL-MCNC: <3 UG/ML (ref 10–20)
AST SERPL-CCNC: 9 UNIT/L (ref 11–45)
BARBITURATE SCN PRESENT UR: NEGATIVE
BASOPHILS # BLD AUTO: 0.02 K/UL
BASOPHILS NFR BLD AUTO: 0.3 %
BENZODIAZ UR QL SCN: ABNORMAL
BILIRUB SERPL-MCNC: 0.4 MG/DL (ref 0.1–1)
BILIRUB UR QL STRIP.AUTO: NEGATIVE
BUN SERPL-MCNC: 14 MG/DL (ref 8–23)
CALCIUM SERPL-MCNC: 12.8 MG/DL (ref 8.7–10.5)
CANNABINOIDS UR QL SCN: NEGATIVE
CHLORIDE SERPL-SCNC: 96 MMOL/L (ref 95–110)
CLARITY UR: CLEAR
CO2 SERPL-SCNC: 34 MMOL/L (ref 23–29)
COCAINE UR QL SCN: NEGATIVE
COLOR UR AUTO: YELLOW
CREAT SERPL-MCNC: 1.2 MG/DL (ref 0.5–1.4)
CREAT UR-MCNC: 47.4 MG/DL (ref 15–325)
ERYTHROCYTE [DISTWIDTH] IN BLOOD BY AUTOMATED COUNT: 12.5 % (ref 11.5–14.5)
ETHANOL SERPL-MCNC: <10 MG/DL
GFR SERPLBLD CREATININE-BSD FMLA CKD-EPI: 51 ML/MIN/1.73/M2
GLUCOSE SERPL-MCNC: 108 MG/DL (ref 70–110)
GLUCOSE UR QL STRIP: NEGATIVE
HCT VFR BLD AUTO: 33.4 % (ref 37–48.5)
HGB BLD-MCNC: 11.2 GM/DL (ref 12–16)
HGB UR QL STRIP: NEGATIVE
IMM GRANULOCYTES # BLD AUTO: 0.02 K/UL (ref 0–0.04)
IMM GRANULOCYTES NFR BLD AUTO: 0.3 % (ref 0–0.5)
KETONES UR QL STRIP: NEGATIVE
LEUKOCYTE ESTERASE UR QL STRIP: NEGATIVE
LYMPHOCYTES # BLD AUTO: 1.2 K/UL (ref 1–4.8)
MAGNESIUM SERPL-MCNC: 1.4 MG/DL (ref 1.6–2.6)
MCH RBC QN AUTO: 29.2 PG (ref 27–50)
MCHC RBC AUTO-ENTMCNC: 33.5 G/DL (ref 32–36)
MCV RBC AUTO: 87 FL (ref 82–98)
METHADONE UR QL SCN: NEGATIVE
NITRITE UR QL STRIP: NEGATIVE
NUCLEATED RBC (/100WBC) (OHS): 0 /100 WBC
OPIATES UR QL SCN: NEGATIVE
PCP UR QL: NEGATIVE
PH UR STRIP: 7 [PH]
PHOSPHATE SERPL-MCNC: 3 MG/DL (ref 2.7–4.5)
PLATELET # BLD AUTO: 155 K/UL (ref 150–450)
PMV BLD AUTO: 12.5 FL (ref 9.2–12.9)
POCT GLUCOSE: 124 MG/DL (ref 70–110)
POTASSIUM SERPL-SCNC: 2.9 MMOL/L (ref 3.5–5.1)
PROT SERPL-MCNC: 6.8 GM/DL (ref 6–8.4)
PROT UR QL STRIP: NEGATIVE
RBC # BLD AUTO: 3.83 M/UL (ref 4–5.4)
RELATIVE EOSINOPHIL (OHS): 0.8 %
RELATIVE LYMPHOCYTE (OHS): 16.9 % (ref 18–48)
RELATIVE MONOCYTE (OHS): 9.3 % (ref 4–15)
RELATIVE NEUTROPHIL (OHS): 72.4 % (ref 38–73)
SALICYLATES SERPL-MCNC: <5 MG/DL (ref 15–30)
SODIUM SERPL-SCNC: 138 MMOL/L (ref 136–145)
SP GR UR STRIP: 1.01
T4 FREE SERPL-MCNC: 1.1 NG/DL (ref 0.71–1.51)
TROPONIN I SERPL DL<=0.01 NG/ML-MCNC: 0.01 NG/ML
TSH SERPL-ACNC: 2.7 UIU/ML (ref 0.4–4)
UROBILINOGEN UR STRIP-ACNC: NEGATIVE EU/DL
WBC # BLD AUTO: 7.08 K/UL (ref 3.9–12.7)

## 2025-03-23 PROCEDURE — 82077 ASSAY SPEC XCP UR&BREATH IA: CPT | Performed by: SURGERY

## 2025-03-23 PROCEDURE — 81003 URINALYSIS AUTO W/O SCOPE: CPT | Performed by: SURGERY

## 2025-03-23 PROCEDURE — 93005 ELECTROCARDIOGRAM TRACING: CPT

## 2025-03-23 PROCEDURE — 82746 ASSAY OF FOLIC ACID SERUM: CPT | Performed by: SURGERY

## 2025-03-23 PROCEDURE — 84443 ASSAY THYROID STIM HORMONE: CPT | Performed by: SURGERY

## 2025-03-23 PROCEDURE — 93010 ELECTROCARDIOGRAM REPORT: CPT | Mod: ,,, | Performed by: INTERNAL MEDICINE

## 2025-03-23 PROCEDURE — 82607 VITAMIN B-12: CPT | Performed by: SURGERY

## 2025-03-23 PROCEDURE — 82962 GLUCOSE BLOOD TEST: CPT

## 2025-03-23 PROCEDURE — 85025 COMPLETE CBC W/AUTO DIFF WBC: CPT | Performed by: SURGERY

## 2025-03-23 PROCEDURE — 84481 FREE ASSAY (FT-3): CPT | Performed by: SURGERY

## 2025-03-23 PROCEDURE — 84425 ASSAY OF VITAMIN B-1: CPT | Performed by: SURGERY

## 2025-03-23 PROCEDURE — 84100 ASSAY OF PHOSPHORUS: CPT | Performed by: SURGERY

## 2025-03-23 PROCEDURE — 84484 ASSAY OF TROPONIN QUANT: CPT | Performed by: SURGERY

## 2025-03-23 PROCEDURE — 80307 DRUG TEST PRSMV CHEM ANLYZR: CPT | Performed by: SURGERY

## 2025-03-23 PROCEDURE — 80143 DRUG ASSAY ACETAMINOPHEN: CPT | Performed by: SURGERY

## 2025-03-23 PROCEDURE — 80053 COMPREHEN METABOLIC PANEL: CPT | Performed by: SURGERY

## 2025-03-23 PROCEDURE — 83735 ASSAY OF MAGNESIUM: CPT | Performed by: SURGERY

## 2025-03-23 PROCEDURE — 80179 DRUG ASSAY SALICYLATE: CPT | Performed by: SURGERY

## 2025-03-23 PROCEDURE — 84439 ASSAY OF FREE THYROXINE: CPT | Performed by: SURGERY

## 2025-03-23 PROCEDURE — 99285 EMERGENCY DEPT VISIT HI MDM: CPT | Mod: 25

## 2025-03-23 PROCEDURE — 82306 VITAMIN D 25 HYDROXY: CPT | Performed by: SURGERY

## 2025-03-23 NOTE — ED TRIAGE NOTES
Pt arrived to ED c/o altered mental status, confusion, hallucinations, and blood pressure variations that worsened this week. Pt's family reports pt has dealt with confusion since October, but her confusion worsened this past week. Pt has been seeing a neurologist via telemedicine.

## 2025-03-23 NOTE — ED PROVIDER NOTES
Encounter Date: 3/23/2025       History     Chief Complaint   Patient presents with    Altered Mental Status     Pt arrived to ED c/o altered mental status, confusion, hallucinations, and blood pressure variations that worsened this week. Pt's family reports pt has dealt with confusion since October, but her confusion worsened this past week. Pt has been seeing a neurologist via telemedicine.      History of Present Illness  Rachel Leal is a 60 y.o. female that presents with altered mental status  Patient has parkinsonism, also has possible fahrs disease based on history tonight  Patient is has a gradual decline since October, followed at the [facility] per HX  Family's concerned the patient was not taking Parkinson's meds as prescribed  Family gotten fall 5 days ago on Wednesday, now taking them as prescribed  Hallucinating, altered, showing progressive signs of parkinsonism on ER triage    The history is provided by the patient.     Review of patient's allergies indicates:   Allergen Reactions    Neupro [rotigotine] Dermatitis     Past Medical History:   Diagnosis Date    Cancer     breast stage iv, remission     Fatty liver     Hereditary hemochromatosis 2015    History of TMJ syndrome     Hyperlipidemia     Hypertension     Hypoglycemia     Osteoarthritis     Parkinson disease     Torn meniscus      Past Surgical History:   Procedure Laterality Date    BREAST SURGERY      augmentation     SECTION      COLONOSCOPY      EXCISION OF CONDYLOMA N/A 2019    Procedure: EXCISION OF ANAL CANAL CONDYLOMA;  Surgeon: Yuinor Starks MD;  Location: [facility] OR;  Service: Colon and Rectal;  Laterality: N/A;    EYE SURGERY      FOOT SURGERY Right 06/15/2022    FULGURATION OF ANAL CONDYLOMA N/A 2019    Procedure: FULGURATION OF ANAL CANAL CONDYLOMA;  Surgeon: Yunior Starks MD;  Location: [facility] OR;  Service: Colon and Rectal;  Laterality: N/A;    HAND SURG[facility]      [facility]     LIVER BIOPSY  12/2017    Mild steatosis, macrovesicular 20% and microvesicular 10%. Mild to moderate siderosis, 2+, within hepatocytes. No fibrosis    MASTECTOMY Left     TONSILLECTOMY       Family History   Problem Relation Name Age of Onset    Colon cancer Mother      Diabetes Father      Heart disease Father      Hemochromatosis Brother      Cirrhosis Brother          hemochromotosis    Breast cancer Neg Hx      Ovarian cancer Neg Hx       Social History[1]  Review of Systems   Constitutional: Negative.    HENT: Negative.     Eyes: Negative.    Respiratory: Negative.     Cardiovascular: Negative.    Gastrointestinal: Negative.    Genitourinary: Negative.    Musculoskeletal: Negative.    Skin: Negative.    Neurological: Negative.    Psychiatric/Behavioral:  Positive for confusion.        Physical Exam     Initial Vitals [03/23/25 1653]   BP Pulse Resp Temp SpO2   123/76 76 16 97.8 °F (36.6 °C) 98 %      MAP       --         Physical Exam    Nursing note and vitals reviewed.  Constitutional: Vital signs are normal. She appears well-developed and well-nourished. She is cooperative.   HENT:   Head: Normocephalic and atraumatic.   Eyes: Conjunctivae, EOM and lids are normal. Pupils are equal, round, and reactive to light.   Neck: Trachea normal and phonation normal. Neck supple. No JVD present.   Normal range of motion.   Full passive range of motion without pain.     Cardiovascular:  Normal rate, regular rhythm, S1 normal, S2 normal, normal heart sounds, intact distal pulses and normal pulses.           Pulmonary/Chest: Effort normal and breath sounds normal.   Abdominal: Abdomen is soft and flat. Bowel sounds are normal.   Musculoskeletal:         General: Normal range of motion.      Cervical back: Full passive range of motion without pain, normal range of motion and neck supple.     Neurological: She is alert and oriented to person, place, and time. She has normal strength.   Skin: Skin is warm, dry and intact.  "Capillary refill takes less than 2 seconds.         ED Course   Procedures  Labs Reviewed   COMPREHENSIVE METABOLIC PANEL - Abnormal       Result Value    Sodium 138      Potassium 2.9 (*)     Chloride 96      CO2 34 (*)     Glucose 108      BUN 14      Creatinine 1.2      Calcium 12.8 (*)     Protein Total 6.8      Albumin 3.6      Bilirubin Total 0.4       (*)     AST 9 (*)     ALT 5 (*)     Anion Gap 8      eGFR 51 (*)    ACETAMINOPHEN LEVEL - Abnormal    Acetaminophen Level <3.0 (*)    SALICYLATE LEVEL - Abnormal    Salicylate Level <5.0 (*)    DRUG SCREEN PANEL, URINE EMERGENCY - Abnormal    Benzodiazepine, Urine Presumptive Positive (*)     Methadone, Urine Negative      Cocaine, Urine Negative      Opiates, Urine Negative      Barbiturates, Urine Negative      Amphetamines, Urine Negative      THC Negative      Phencyclidine, Urine Negative      Urine Creatinine 47.4      Narrative:     This screen includes the following classes of drugs at the listed cut-off:     Benzodiazepines:        200 ng/ml   Methadone:              300 ng/ml   Cocaine metabolite:     300 ng/ml   Opiates:                300 ng/ml   Barbiturates:           200 ng/ml   Amphetamines:           1000 ng/ml   Marijuana metabs (THC): 50 ng/ml   Phencyclidine (PCP):    25 ng/ml     This is a screening test. If results do not correlate with clinical presentation, then a confirmatory send out test is advised.    This report is intended for use in clinical monitoring and management of patients. It is not intended for use in employment related drug testing."   MAGNESIUM - Abnormal    Magnesium  1.4 (*)    CBC WITH DIFFERENTIAL - Abnormal    WBC 7.08      RBC 3.83 (*)     HGB 11.2 (*)     HCT 33.4 (*)     MCV 87      MCH 29.2      MCHC 33.5      RDW 12.5      Platelet Count 155      MPV 12.5      Nucleated RBC 0      Neut % 72.4      Lymph % 16.9 (*)     Mono % 9.3      Eos % 0.8      Basophil % 0.3      Imm Grans % 0.3      Neut # 5.12      " Lymph # 1.20      Mono # 0.66      Eos # 0.06      Baso # 0.02      Imm Grans # 0.02     POCT GLUCOSE - Abnormal    POCT Glucose 124 (*)    URINALYSIS, REFLEX TO URINE CULTURE - Normal    Color, UA Yellow      Appearance, UA Clear      pH, UA 7.0      Spec Grav UA 1.010      Protein, UA Negative      Glucose, UA Negative      Ketones, UA Negative      Bilirubin, UA Negative      Blood, UA Negative      Nitrites, UA Negative      Urobilinogen, UA Negative      Leukocyte Esterase, UA Negative     TSH - Normal    TSH 2.702     ALCOHOL,MEDICAL (ETHANOL) - Normal    Alcohol, Serum <10     T4, FREE - Normal    Free T4 1.10     PHOSPHORUS - Normal    Phosphorus Level 3.0     TROPONIN I - Normal    Troponin-I 0.014     CBC W/ AUTO DIFFERENTIAL    Narrative:     The following orders were created for panel order CBC Auto Differential.  Procedure                               Abnormality         Status                     ---------                               -----------         ------                     CBC with Differential[[id number]]       Abnormal            Final result                 Please view results for these tests on the individual orders.   VITAMIN D   VITAMIN B1   FOLATE   T3, FREE   VITAMIN B12     EKG Readings: (Independently Interpreted)   No STEMI  Normal sinus rhythm  No ectopy  Normal conduction  Normal ST segments  Normal T-wave  Normal axis  Heart rate in the 70s       Imaging Results              CT Head Without Contrast (Final result)  Result time 03/23/25 18:26:57      Final result by Cayetano Kessler DO (03/23/25 18:26:57)                   Impression:      No acute intracranial abnormality.      Electronically signed by: Cayetano Kessler  Date:    03/23/2025  Time:    18:26               Narrative:    EXAMINATION:  CT HEAD WITHOUT CONTRAST    CLINICAL HISTORY:  Mental status change, unknown cause;    TECHNIQUE:  Low dose axial CT images obtained throughout the head without intravenous contrast.  Sagittal and coronal reconstructions were performed.    COMPARISON:  CT head from 02/27/2025.    FINDINGS:  Ventricles and sulci are normal in size for age without evidence of hydrocephalus. No extra-axial blood or fluid collections.  There are chronic microvascular ischemic changes.  No parenchymal mass, hemorrhage, edema or major vascular distribution infarct.    No calvarial fracture.  The scalp is unremarkable.  Bilateral paranasal sinuses and mastoid air cells are clear.                                       Medications - No data to display  Medical Decision Making  Differential includes Parkinson's progression, psychiatric illness, dehydration, UTI, encephalopathy  Also includes medication error, medication overdosing/self medicating, delirium, electrolyte abnormality    Problems Addressed:  Altered mental status: complicated acute illness or injury    Amount and/or Complexity of Data Reviewed  Labs: ordered. Decision-making details documented in ED Course.  Radiology: ordered and independent interpretation performed.  ECG/medicine tests: ordered and independent interpretation performed.    Risk  Risk Details:   CT head & extensive lab work ordered in the emergency room today  Urinalysis ordered for rule out urine drug screen, extensive lab work  Will need to discuss with Neurology after clearing all possible diagnosis  Will transition this patient to the oncoming ER physician at 6:00 p.m.    Clinical Impression:  Final diagnoses:  [R41.82] Altered mental status (Primary)  [T50.905A] Adverse effect of drug, initial encounter  [R44.3] Hallucinations     Care transitioned to me pending workup and neurology consult. Workup grossly unremarkable. Noted hypercalemia which pt has had previously. Neurology consulted, suspect AMS and hallucinations related to medication excess, adjusted medications with patients family who will be assisting with medication administration for patient. No evidence of infectious etiology.  Follow up with neurology. Return precautions given. Discharged home with family.      ED Disposition Condition    Discharge Stable          ED Prescriptions    None       Follow-up Information       Follow up With Specialties Details Why Contact Info     [facility] - Emergency Dept Emergency Medicine Go to  As needed, If symptoms worsen [address] [place] [id number] [phone]    Cata Leal MD Internal Medicine Schedule an appointment as soon as possible for a visit in 1 week  [address] [place] [id number]  [phone]                   [1]   Social History  Tobacco Use    Smoking status: Former     Current packs/day: 0.00     Types: Cigarettes     Quit date: 2013     Years since quittin.5    Smokeless tobacco: Never   Substance Use Topics    Alcohol use: Yes     Comment: drinking 6 peers per month, no daily use     Drug use: No        Sukhdeep Alvarez MD  [phone]

## 2025-03-24 ENCOUNTER — HOSPITAL ENCOUNTER (OUTPATIENT)
Dept: PULMONOLOGY | Facility: HOSPITAL | Age: 60
Discharge: HOME | End: 2025-03-24
Attending: NURSE PRACTITIONER
Payer: COMMERCIAL

## 2025-03-24 DIAGNOSIS — I10 BENIGN ESSENTIAL HTN: ICD-10-CM

## 2025-03-24 LAB
25(OH)D3+25(OH)D2 SERPL-MCNC: 91 NG/ML (ref 30–96)
AV INDEX (PROSTH): 0.71
AV MEAN GRADIENT: 4 MMHG
AV PEAK GRADIENT: 9 MMHG
AV VALVE AREA BY VELOCITY RATIO: 1.9 CM²
AV VALVE AREA: 2.2 CM²
AV VELOCITY RATIO: 0.6
CV ECHO LV RWT: 0.44 CM
DOP CALC AO PEAK VEL: 1.5 M/S
DOP CALC AO VTI: 25.3 CM
DOP CALC LVOT AREA: 3.1 CM2
DOP CALC LVOT DIAMETER: 2 CM
DOP CALC LVOT PEAK VEL: 0.9 M/S
DOP CALC LVOT STROKE VOLUME: 56.2 CM3
DOP CALCLVOT PEAK VEL VTI: 17.9 CM
E WAVE DECELERATION TIME: 159 MSEC
E/A RATIO: 0.95
E/E' RATIO: 7 M/S
ECHO LV POSTERIOR WALL: 1.1 CM (ref 0.6–1.1)
FOLATE SERPL-MCNC: 6 NG/ML (ref 4–24)
FRACTIONAL SHORTENING: 40 % (ref 28–44)
INTERVENTRICULAR SEPTUM: 0.7 CM (ref 0.6–1.1)
IVC DIAMETER: 1.74 CM
IVRT: 88 MSEC
LA MAJOR: 4.1 CM
LEFT ATRIUM AREA SYSTOLIC (APICAL 2 CHAMBER): 12.15 CM2
LEFT ATRIUM AREA SYSTOLIC (APICAL 4 CHAMBER): 9.83 CM2
LEFT ATRIUM SIZE: 3.2 CM
LEFT ATRIUM VOLUME MOD: 21 ML
LEFT INTERNAL DIMENSION IN SYSTOLE: 3 CM (ref 2.1–4)
LEFT VENTRICLE DIASTOLIC VOLUME: 116 ML
LEFT VENTRICLE END SYSTOLIC VOLUME APICAL 2 CHAMBER: 23.23 ML
LEFT VENTRICLE END SYSTOLIC VOLUME APICAL 4 CHAMBER: 17.51 ML
LEFT VENTRICLE SYSTOLIC VOLUME: 35 ML
LEFT VENTRICULAR INTERNAL DIMENSION IN DIASTOLE: 5 CM (ref 3.5–6)
LEFT VENTRICULAR MASS: 158.2 G
LV LATERAL E/E' RATIO: 7.4 M/S
LV SEPTAL E/E' RATIO: 7.4 M/S
LVED V (TEICH): 116.46 ML
LVES V (TEICH): 34.67 ML
LVOT MG: 1.97 MMHG
LVOT MV: 0.65 CM/S
MV PEAK A VEL: 0.62 M/S
MV PEAK E VEL: 0.59 M/S
MV STENOSIS PRESSURE HALF TIME: 40.38 MS
MV VALVE AREA P 1/2 METHOD: 5.45 CM2
OHS CV RV/LV RATIO: 0.52 CM
OHS QRS DURATION: 88 MS
OHS QTC CALCULATION: 400 MS
PISA TR MAX VEL: 2.4 M/S
PULM VEIN S/D RATIO: 1.11
PV MV: 0.35 M/S
PV PEAK D VEL: 0.35 M/S
PV PEAK GRADIENT: 1 MMHG
PV PEAK S VEL: 0.39 M/S
PV PEAK VELOCITY: 0.59 M/S
RA MAJOR: 4.5 CM
RA PRESSURE ESTIMATED: 3 MMHG
RIGHT VENTRICLE DIASTOLIC BASEL DIMENSION: 2.6 CM
RIGHT VENTRICULAR END-DIASTOLIC DIMENSION: 2.59 CM
RV TB RVSP: 5 MMHG
RV TISSUE DOPPLER FREE WALL SYSTOLIC VELOCITY 1 (APICAL 4 CHAMBER VIEW): 14.47 CM/S
T3FREE SERPL-MCNC: 3.1 PG/ML (ref 2.3–4.2)
TDI LATERAL: 0.08 M/S
TDI SEPTAL: 0.08 M/S
TDI: 0.08 M/S
TR MAX PG: 23 MMHG
TRICUSPID ANNULAR PLANE SYSTOLIC EXCURSION: 1.84 CM
TV REST PULMONARY ARTERY PRESSURE: 26 MMHG
VIT B12 SERPL-MCNC: 727 PG/ML (ref 210–950)

## 2025-03-24 PROCEDURE — 93306 TTE W/DOPPLER COMPLETE: CPT | Mod: 26,,, | Performed by: INTERNAL MEDICINE

## 2025-03-24 PROCEDURE — 93306 TTE W/DOPPLER COMPLETE: CPT

## 2025-03-24 NOTE — CONSULTS
[facility] Neurological Movement [facility] 8th Floor  Response for E-Consult     Patient Name: Rachel Leal  MRN: [id number]  Primary Care Provider: Cata Leal MD   Requesting Provider: Sukhdeep Alvarez MD  Consults    60 y.o. woman with longstanding iPD coming in with confusion.  Has not been taking her prescribed PD medications as noted on prescriptions and notably more confused when her daughter took over her care and gave her doses as instructed.  We discuss  Recommendation: Decrease carbidopa/levodopa 25/100mg 1.5 tabs each dose to 1 tab PO TID     Additional future steps to consider: stop PM amantadine    Total time of Consultation: 20 minute    I did speak to the requesting provider verbally about this.     *This eConsult is based on the clinical data available to me and is furnished without benefit of a physical examination. The eConsult will need to be interpreted in light of any clinical issues or changes in patient status not available to me at the time of filing this eConsults. Significant changes in patient condition or level of acuity should result in immediate formal consultation and reevaluation. Please alert me if you have further questions.    Thank you for this eConsult referral.     MD [facility] - Neurological Movement Disorders 8th Floor

## 2025-03-25 DIAGNOSIS — R07.9 CHEST PAIN, UNSPECIFIED TYPE: ICD-10-CM

## 2025-03-25 RX ORDER — ESOMEPRAZOLE MAGNESIUM 40 MG/1
40 CAPSULE, DELAYED RELEASE ORAL
Qty: 90 CAPSULE | Refills: 2 | Status: SHIPPED | OUTPATIENT
Start: 2025-03-25

## 2025-03-25 NOTE — TELEPHONE ENCOUNTER
No care due was identified.  [facility] Embedded Care Due Messages. Reference number: [id number].   3/25/2025 12:20:54 AM CDT

## 2025-03-26 ENCOUNTER — TELEPHONE (OUTPATIENT)
Facility: CLINIC | Age: 60
End: 2025-03-26
Payer: COMMERCIAL

## 2025-03-26 ENCOUNTER — PATIENT MESSAGE (OUTPATIENT)
Facility: CLINIC | Age: 60
End: 2025-03-26
Payer: COMMERCIAL

## 2025-03-26 NOTE — TELEPHONE ENCOUNTER
Called Pt today to schedule a virtual visit for 40 mins. Pt  and is scheduled on 4/14 for 40 mins at 2pm. Also pt still has there in person on 5/27/25 at 2pm.

## 2025-03-27 NOTE — TELEPHONE ENCOUNTER
Received message from pt's family that pt's tremors are worse and she is having trouble communicating. Med list attached. Forwarded to Dr. Gresham to advise.

## 2025-03-31 ENCOUNTER — PATIENT MESSAGE (OUTPATIENT)
Dept: ADMINISTRATIVE | Facility: HOSPITAL | Age: 60
End: 2025-03-31
Payer: COMMERCIAL

## 2025-04-04 ENCOUNTER — OFFICE VISIT (OUTPATIENT)
Dept: CARDIOLOGY | Facility: CLINIC | Age: 60
End: 2025-04-04
Payer: COMMERCIAL

## 2025-04-04 VITALS
BODY MASS INDEX: 26.41 KG/M2 | DIASTOLIC BLOOD PRESSURE: 76 MMHG | OXYGEN SATURATION: 98 % | HEIGHT: 62 IN | HEART RATE: 71 BPM | RESPIRATION RATE: 18 BRPM | SYSTOLIC BLOOD PRESSURE: 127 MMHG | WEIGHT: 143.5 LBS

## 2025-04-04 DIAGNOSIS — I10 BENIGN ESSENTIAL HTN: Primary | ICD-10-CM

## 2025-04-04 DIAGNOSIS — Z13.6 ENCOUNTER FOR SCREENING FOR CARDIOVASCULAR DISORDERS: ICD-10-CM

## 2025-04-04 DIAGNOSIS — E78.5 HYPERLIPIDEMIA, UNSPECIFIED HYPERLIPIDEMIA TYPE: ICD-10-CM

## 2025-04-04 PROCEDURE — 99999 PR PBB SHADOW E&M-EST. PATIENT-LVL IV: CPT | Mod: PBBFAC,,, | Performed by: NURSE PRACTITIONER

## 2025-04-04 PROCEDURE — 3078F DIAST BP <80 MM HG: CPT | Mod: CPTII,S$GLB,, | Performed by: NURSE PRACTITIONER

## 2025-04-04 PROCEDURE — 99214 OFFICE O/P EST MOD 30 MIN: CPT | Mod: S$GLB,,, | Performed by: NURSE PRACTITIONER

## 2025-04-04 PROCEDURE — 3008F BODY MASS INDEX DOCD: CPT | Mod: CPTII,S$GLB,, | Performed by: NURSE PRACTITIONER

## 2025-04-04 PROCEDURE — 3074F SYST BP LT 130 MM HG: CPT | Mod: CPTII,S$GLB,, | Performed by: NURSE PRACTITIONER

## 2025-04-04 PROCEDURE — 4010F ACE/ARB THERAPY RXD/TAKEN: CPT | Mod: CPTII,S$GLB,, | Performed by: NURSE PRACTITIONER

## 2025-04-04 RX ORDER — HYDROCHLOROTHIAZIDE 25 MG/1
25 TABLET ORAL DAILY
Qty: 60 TABLET | Refills: 5 | Status: SHIPPED | OUTPATIENT
Start: 2025-04-04

## 2025-04-04 NOTE — PROGRESS NOTES
Cardiology Clinic note    Subjective:   Patient ID:  Rachel Leal is a 60 y.o. female who presents for follow-up of hypertension, hyperlipidemia    HPI:   Rachel Leal  has a past medical history of Cancer, Fatty liver, Hereditary hemochromatosis (12/16/2015), History of TMJ syndrome, Hyperlipidemia, Hypertension, Hypoglycemia, Osteoarthritis, Parkinson disease, and Torn meniscus.    Here for blood pressure follow-up   Takes losartan and had amlodipine 2.5 mg added last visit  Chest pain resolved     HLD; on statin      HTN; only takes losartan      Stage 4 breast CA; XGIVA treatment   Parkinsons disease  Hypercalcemia; thought to be related to dehydration         Patient Active Problem List    Diagnosis Date Noted    Secondary malignant neoplasm of bone 03/26/2024    Balance disorder 09/18/2023    Functional gait abnormality 09/18/2023    History of cancer metastatic to bone 01/18/2023    S/P mastectomy 04/27/2022    Right leg numbness 01/11/2022    Benign essential HTN 06/14/2021    Condyloma 01/31/2019    Fatty liver 01/03/2018     Liver biopsy 12/2017 - Mild steatosis, macrovesicular 20% and microvesicular 10%. Mild to moderate siderosis, 2+, within hepatocytes. No fibrosis      Obesity (BMI 30-39.9) 01/03/2018    Parkinson disease 11/08/2016    Anxiety 06/24/2016    Hereditary hemochromatosis 12/16/2015     Liver biopsy 12/2017 - Mild steatosis, macrovesicular 20% and microvesicular 10%. Mild to moderate siderosis, 2+, within hepatocytes. No fibrosis      Neuropathy 09/25/2014    Hx of breast cancer 09/12/2014    Hyperlipidemia 01/02/2013       Patient's Medications   New Prescriptions    No medications on file   Previous Medications    AMANTADINE HCL (SYMMETREL) 100 MG CAPSULE    TAKE 1 CAPSULE BY MOUTH THREE TIMES A DAY    AMLODIPINE (NORVASC) 2.5 MG TABLET    Take 1 tablet (2.5 mg total) by mouth once daily.    ANASTROZOLE (ARIMIDEX) 1 MG TAB    Take 1 mg by mouth once daily.     "ATORVASTATIN (LIPITOR) 40 MG TABLET    Take 1 tablet (40 mg total) by mouth once daily.    CARBIDOPA-LEVODOPA (RYTARY) 36. MG CPSR    Take 3 capsules by mouth every evening.    CARBIDOPA-LEVODOPA  MG (SINEMET)  MG PER TABLET    Take 2 tablets by mouth 4 (four) times daily.    DIAZEPAM (VALIUM) 2 MG TABLET    Take 2 tablets (4 mg total) by mouth nightly as needed. AS NEEDED FOR INSOMNIA    DOCUSATE SODIUM (STOOL SOFTENER ORAL)    Take by mouth.    ESOMEPRAZOLE (NEXIUM) 40 MG CAPSULE    TAKE 1 CAPSULE BY MOUTH BEFORE BREAKFAST.    LOSARTAN (COZAAR) 50 MG TABLET    Take 1 tablet by mouth every morning.    MELOXICAM (MOBIC) 15 MG TABLET    Take 1 tablet (15 mg total) by mouth once daily.    OXYBUTYNIN (DITROPAN-XL) 5 MG TR24    Take 5 mg by mouth once daily.    VENLAFAXINE (EFFEXOR-XR) 75 MG 24 HR CAPSULE    Take 75 mg by mouth once daily.     VITAMIN D 1000 UNITS TAB    Take 1,000 mg by mouth once daily.   Modified Medications    No medications on file   Discontinued Medications    No medications on file        Review of Systems   Constitutional: Negative.   Cardiovascular:  Negative for chest pain, claudication, cyanosis, dyspnea on exertion, irregular heartbeat, leg swelling, near-syncope, orthopnea, palpitations, paroxysmal nocturnal dyspnea and syncope.   Respiratory: Negative.     Musculoskeletal: Negative.          Objective:   Vitals  Vitals:    04/04/25 0904   BP: 127/76   Pulse: 71   Resp: 18   SpO2: 98%   Weight: 65.1 kg (143 lb 8.3 oz)   Height: 5' 2" (1.575 m)          Physical Exam  Cardiovascular:      Rate and Rhythm: Normal rate and regular rhythm.      Pulses: Normal pulses.      Heart sounds: Normal heart sounds.   Musculoskeletal:         General: Normal range of motion.   Skin:     General: Skin is warm.   Neurological:      Mental Status: She is alert.           Lab Results    Lab Results   Component Value Date    WBC 7.08 03/23/2025    HGB 11.2 (L) 03/23/2025    HGB 12.8 " 02/27/2025    HCT 33.4 (L) 03/23/2025    HCT 38.1 02/27/2025    MCV 87 03/23/2025    MCV 90 02/27/2025       Lab Results   Component Value Date     03/23/2025     02/27/2025    INR 1.0 12/04/2019       Lab Results   Component Value Date    K 2.9 (L) 03/23/2025    K 3.2 (L) 02/27/2025    MG 1.4 (L) 03/23/2025    BUN 14 03/23/2025    CREATININE 1.2 03/23/2025       Lab Results   Component Value Date    GLU 79 02/27/2025    HGBA1C 5.4 03/23/2024       Lab Results   Component Value Date    AST 9 (L) 03/23/2025    AST 8 (L) 02/27/2025    ALT 5 (L) 03/23/2025    ALT <5 (L) 02/27/2025    ALBUMIN 3.6 03/23/2025    ALBUMIN 3.7 02/27/2025    PROT 7.0 02/27/2025       Lab Results   Component Value Date    CHOL 153 01/18/2025    HDL 54 01/18/2025    LDLCALC 77.4 01/18/2025    TRIG 108 01/18/2025       Lab Results   Component Value Date    BNP 11 02/27/2025       Assessment:     Problem List Items Addressed This Visit       Hyperlipidemia    Benign essential HTN - Primary       Plan:     Blood pressure improved and stable   Check calcium score  Hydrochlorothiazide p.r.n. for swelling      Continue with current medical plan and lifestyle changes.      No orders of the defined types were placed in this encounter.      Follow up in 3m    Return sooner for concerns or questions. If symptoms persist go to the ED    She expressed verbal understanding and agreed with the plan    Thank you for the opportunity to care for this patient. Will be available for questions if needed.     Total duration of face to face visit time 30 minutes.  Total time spent counseling greater than fifty percent of total visit time.  Counseling included discussion regarding imaging findings, diagnosis, possibilities, treatment options, risks and benefits.    CARLENE Gallo-C  Cardiology Clinic  [facility]

## 2025-04-14 ENCOUNTER — OFFICE VISIT (OUTPATIENT)
Facility: CLINIC | Age: 60
End: 2025-04-14
Payer: COMMERCIAL

## 2025-04-14 ENCOUNTER — PATIENT MESSAGE (OUTPATIENT)
Facility: CLINIC | Age: 60
End: 2025-04-14

## 2025-04-14 DIAGNOSIS — R41.3 MEMORY CHANGE: Primary | ICD-10-CM

## 2025-04-14 DIAGNOSIS — G20.A2 PARKINSON'S DISEASE WITHOUT DYSKINESIA, WITH FLUCTUATING MANIFESTATIONS: ICD-10-CM

## 2025-04-14 PROCEDURE — G2211 COMPLEX E/M VISIT ADD ON: HCPCS | Mod: 95,,, | Performed by: PSYCHIATRY & NEUROLOGY

## 2025-04-14 PROCEDURE — 98007 SYNCH AUDIO-VIDEO EST HI 40: CPT | Mod: 95,,, | Performed by: PSYCHIATRY & NEUROLOGY

## 2025-04-14 PROCEDURE — 4010F ACE/ARB THERAPY RXD/TAKEN: CPT | Mod: CPTII,95,, | Performed by: PSYCHIATRY & NEUROLOGY

## 2025-04-14 PROCEDURE — 1159F MED LIST DOCD IN RCRD: CPT | Mod: CPTII,95,, | Performed by: PSYCHIATRY & NEUROLOGY

## 2025-04-14 RX ORDER — LEVODOPA AND CARBIDOPA 145; 36.25 MG/1; MG/1
3 CAPSULE, EXTENDED RELEASE ORAL 4 TIMES DAILY
Qty: 360 CAPSULE | Refills: 12 | Status: SHIPPED | OUTPATIENT
Start: 2025-04-14

## 2025-04-14 NOTE — ASSESSMENT & PLAN NOTE
Parkinsonism, familial autosomal dominant. GBA POS [id number]  Pt and family have not yet met with genetic counselor provided by [employer]  I suggested connecting with them and if all needs not met  We discussed that testing in non-symptomatic people is a personal choice   [employer] trials are available for GBA PD    Doing better now carbidopa/levodopa 25/100mg 2 tabs   7/11/3pm  Rytary 3 caps 145mg QHS  Amantadine 100mg   7am and 1pm    Could switch all doses carbidopa/levodopa to rytary 3 caps every dose  Continue Amantadine 100mg   7am and 1pm  No hallucinations, orthostasis

## 2025-04-14 NOTE — PROGRESS NOTES
The patient location is: HOME  The chief complaint leading to visit is: iPD  1. Parkinson's disease without dyskinesia, with fluctuating manifestations        2. Memory change            Visit type: Virtual visit with synchronous audio and video    Face to Face time with patient: 20mins  30 minutes of total time spent on the encounter, which includes face to face time and non-face to face time preparing to see the patient (eg, review of tests), Obtaining and/or reviewing separately obtained history, Documenting clinical information in the electronic or other health record, Independently interpreting results (not separately reported) and communicating results to the patient/family/caregiver, or Care coordination (not separately reported).     Each patient to whom he or she provides medical services by telemedicine is:  (1) informed of the relationship between the physician and patient and the respective role of any other health care provider with respect to management of the patient; and (2) notified that he or she may decline to receive medical services by telemedicine and may withdraw from such care at any time.        MOVEMENT DISORDERS CLINIC    PCP/Referring Provider: No referring provider defined for this encounter.  Date of Service: 4/14/2025    Chief Complaint: PD    Interval Hx  Since last visit,   Medication doses change several times and got confused    Doing better now carbidopa/levodopa 25/100mg 2 tabs   7/11/3pm  Rytary 3 caps 145mg QHS    Amantadine 100mg   7am and 1pm    No hallucinations, orthostasis    Had a stress fracture of leg and rehabbing  Using a cane    Feet burning consistently and affect walking  B12 found low and started oral B12 daily  She reports it was found repleted  Had not restarted b12    GBA POS  [id number]    She has 2 children  42 and 37yo    No dizziness, nausea, halluciantions  Walking better  Hand dexterity is better  No dyskinesias    Falls: none  Assist Device: none  Can  "walk 300 feet before she tires    Med HX  Started ropinirole 0.25mg PO TID and felt better but at 0.5mg PO TID felt worse, was feeling weak and stopped    "PriorHPI: Rachel Leal is a R HANDED 60 y.o. female with a medical issues significant for hemochromatosis, HTN, HL, Breast Cancer s/p chemotherapy, who presents with PDism. Notes her PDism began at age 53. R sided rigidity and arm stiffness began and progressed. "Just like mom." At this point has a moderate R hand resting tremor and     Started carbidopa/levodopa 25/100mg 1 tab daily 2017  Currently on Amantadine 100mg Po TID. This helps her tremor. 8/2/PM  No cognitive issues or leg swelling.  Has 4 hour ONtimes with Amantadine  When she was on both carbidopa/levodopa and amantadine she "felt worse"    R foot inversions when OFF  No orthostasis  No gambling tesnencies    [place] descent  Mother and grandmother had PDism"    Review of Systems:   Review of Systems   Constitutional: Negative for fever.   HENT: Negative for congestion.    Eyes: Negative for double vision.   Respiratory: Negative for cough and shortness of breath.    Cardiovascular: Negative for chest pain and leg swelling.   Gastrointestinal: Negative for nausea.   Genitourinary: Negative for dysuria.   Musculoskeletal: Negative for falls.   Skin: Negative for rash.   Neurological: Positive for tremors and speech change. Negative for headaches.   Psychiatric/Behavioral: Negative for depression.         Current Medications:  Outpatient Encounter Medications as of 4/14/2025   Medication Sig Dispense Refill    amantadine HCL (SYMMETREL) 100 mg capsule TAKE 1 CAPSULE BY MOUTH THREE TIMES A  capsule 0    amLODIPine (NORVASC) 2.5 MG tablet Take 1 tablet (2.5 mg total) by mouth once daily. 30 tablet 2    anastrozole (ARIMIDEX) 1 mg Tab Take 1 mg by mouth once daily.      atorvastatin (LIPITOR) 40 MG tablet Take 1 tablet (40 mg total) by mouth once daily. 90 tablet 0    carbidopa-levodopa " (RYTARY) 36. mg CpSR Take 3 capsules by mouth every evening. 90 capsule 12    carbidopa-levodopa  mg (SINEMET)  mg per tablet Take 2 tablets by mouth 4 (four) times daily. 720 tablet 9    diazePAM (VALIUM) 2 MG tablet Take 2 tablets (4 mg total) by mouth nightly as needed. AS NEEDED FOR INSOMNIA 60 tablet 0    docusate sodium (STOOL SOFTENER ORAL) Take by mouth.      esomeprazole (NEXIUM) 40 MG capsule TAKE 1 CAPSULE BY MOUTH BEFORE BREAKFAST. 90 capsule 2    hydroCHLOROthiazide (HYDRODIURIL) 25 MG tablet Take 1 tablet (25 mg total) by mouth once daily. 60 tablet 5    losartan (COZAAR) 50 MG tablet Take 1 tablet by mouth every morning.      meloxicam (MOBIC) 15 MG tablet Take 1 tablet (15 mg total) by mouth once daily.      oxybutynin (DITROPAN-XL) 5 MG TR24 Take 5 mg by mouth once daily.      venlafaxine (EFFEXOR-XR) 75 MG 24 hr capsule Take 75 mg by mouth once daily.       vitamin D 1000 units Tab Take 1,000 mg by mouth once daily.       No facility-administered encounter medications on file as of 2025.       Past Medical History:  Patient Active Problem List   Diagnosis    Hyperlipidemia    Hx of breast cancer    Neuropathy    Hereditary hemochromatosis    Anxiety    Parkinson disease    Fatty liver    Obesity (BMI 30-39.9)    Condyloma    Benign essential HTN    Right leg numbness    S/P mastectomy    History of cancer metastatic to bone    Balance disorder    Functional gait abnormality    Secondary malignant neoplasm of bone    Memory change       Past Surgical History:  Past Surgical History:   Procedure Laterality Date    BREAST SURGERY      augmentation     SECTION      COLONOSCOPY      EXCISION OF CONDYLOMA N/A 2019    Procedure: EXCISION OF ANAL CANAL CONDYLOMA;  Surgeon: Yunior Starks MD;  Location: [facility] OR;  Service: Colon and Rectal;  Laterality: N/A;    EYE SURGERY      FOOT SURGERY Right 06/15/2022    FULGURATION OF ANAL CONDYLOMA N/A 2019    Procedure:  FULGURATION OF ANAL CANAL CONDYLOMA;  Surgeon: Yunior Starks MD;  Location: [facility] OR;  Service: Colon and Rectal;  Laterality: N/A;    HAND SURGERY      HYSTERECTOMY      [facility] BSO    LIVER BIOPSY  2017    Mild steatosis, macrovesicular 20% and microvesicular 10%. Mild to moderate siderosis, 2+, within hepatocytes. No fibrosis    MASTECTOMY Left     TONSILLECTOMY         Current Living Situation: home    Social:  Social History     Socioeconomic History    Marital status:    Tobacco Use    Smoking status: Former     Current packs/day: 0.00     Types: Cigarettes     Quit date: 2013     Years since quittin.5    Smokeless tobacco: Never   Substance and Sexual Activity    Alcohol use: Yes     Comment: drinking 6 peers per month, no daily use     Drug use: No    Sexual activity: Yes     Partners: Male     Birth control/protection: Surgical     Comment: .     Social Drivers of Health     Financial Resource Strain: Medium Risk (3/3/2025)    Overall Financial Resource Strain (CARDIA)     Difficulty of Paying Living Expenses: Somewhat hard   Food Insecurity: No Food Insecurity (3/3/2025)    Hunger Vital Sign     Worried About Running Out of Food in the Last Year: Never true     Ran Out of Food in the Last Year: Never true   Transportation Needs: No Transportation Needs (3/3/2025)    PRAPARE - Transportation     Lack of Transportation (Medical): No     Lack of Transportation (Non-Medical): No   Physical Activity: Inactive (3/3/2025)    Exercise Vital Sign     Days of Exercise per Week: 0 days     Minutes of Exercise per Session: 30 min   Stress: Stress Concern Present (3/3/2025)    [employer] - Occupational Stress Questionnaire     Feeling of Stress : Rather much   Housing Stability: Low Risk  (3/3/2025)    Housing Stability Vital Sign     Unable to Pay for Housing in the Last Year: No     Homeless in the Last Year: No       Family History:  Family History   Problem  Relation Name Age of Onset    Colon cancer Mother      Diabetes Father      Heart disease Father      Hemochromatosis Brother      Cirrhosis Brother          hemochromotosis    Breast cancer Neg Hx      Ovarian cancer Neg Hx         PHYSICAL:  LMP 01/17/2012     Physical Exam  Constitutional: Well-developed, well-nourished, appears stated age  Eyes: No scleral icterus  ENT: Moist oral mucosa  Cardiovascular: No lower extremity edema   Respiratory: No labored breathing   Skin: No rash   Hematologic: No bruising    Other: GI/ deferred   Mental status: Alert and oriented to person, place, time, and situation;   follows commands, mildly confused  Speech: normal (not dysarthric), no aphasia  Cranial nerves:            CN II: Pupils mid-position and equal, not tested light or accommodation  CN III, IV, VI: Extraocular movements full, no nystagmus visualized  CN V: Not tested   CN VII: Face strong and symmetric bilaterally   CN VIII: Hearing intact to voice and conversation   CN IX, X: Palate raises midline and symmetric   CN XI: Strong shoulder shrug B/L  CN XII: Tongue appears midline   Motor: Normal bulk by appearance, no drift   Sensory: Not tested    Gait: fairly normal (out of proportion) to arms)  Deep tendon reflexes: Not tested  Movement/Coordination                    Mod hypophonic speech.                     Mod facial masking..                   No tremor with rest, posture, kinesis, or intention.   FNF NL  R hand and R foot dystonia mild    L hand tremor mild    ? Finger taps Finger flicks RAE Heel taps   Left 2+ 2+ - 2+   Right 3+ 3+ - 2+     Laboratory Data:  NA    Imaging:  Reported as NL    Assessment//Plan:   Problem List Items Addressed This Visit          Neuro    Parkinson disease - Primary    Current Assessment & Plan   Parkinsonism, familial autosomal dominant. GBA POS [id number]  Pt and family have not yet met with genetic counselor provided by [facility]  I suggested connecting with them and  if all needs not met  We discussed that testing in non-symptomatic people is a personal choice   Precision medicine trials are available for GBA PD    Doing better now carbidopa/levodopa 25/100mg 2 tabs   7/11/3pm  Rytary 3 caps 145mg QHS  Amantadine 100mg   7am and 1pm    Could switch all doses carbidopa/levodopa to rytary 3 caps every dose  Continue Amantadine 100mg   7am and 1pm  No hallucinations, orthostasis             Memory change    Current Assessment & Plan   Suggested Memory eval given reported issues  GBA POS PDism              This visit covered ongoing complex medical needs extending over multiple office visits covering multiple chronic issues.      Anu Gresham MD, MS  [facility]  Department of Neurology  Movement Disorders

## 2025-04-21 ENCOUNTER — LAB VISIT (OUTPATIENT)
Dept: LAB | Facility: HOSPITAL | Age: 60
End: 2025-04-21
Attending: PSYCHIATRY & NEUROLOGY
Payer: COMMERCIAL

## 2025-04-21 DIAGNOSIS — R79.89 LOW VITAMIN B12 LEVEL: ICD-10-CM

## 2025-04-21 PROCEDURE — 82607 VITAMIN B-12: CPT

## 2025-04-21 PROCEDURE — 36415 COLL VENOUS BLD VENIPUNCTURE: CPT

## 2025-04-22 LAB — VIT B12 SERPL-MCNC: 526 NG/L (ref 180–914)

## 2025-05-08 ENCOUNTER — OFFICE VISIT (OUTPATIENT)
Dept: INTERNAL MEDICINE | Facility: CLINIC | Age: 60
End: 2025-05-08
Payer: COMMERCIAL

## 2025-05-08 VITALS
BODY MASS INDEX: 26.17 KG/M2 | DIASTOLIC BLOOD PRESSURE: 80 MMHG | OXYGEN SATURATION: 99 % | RESPIRATION RATE: 18 BRPM | WEIGHT: 142.19 LBS | HEART RATE: 81 BPM | HEIGHT: 62 IN | SYSTOLIC BLOOD PRESSURE: 130 MMHG

## 2025-05-08 DIAGNOSIS — K43.9 VENTRAL HERNIA WITHOUT OBSTRUCTION OR GANGRENE: Primary | ICD-10-CM

## 2025-05-08 PROCEDURE — 3075F SYST BP GE 130 - 139MM HG: CPT | Mod: CPTII,S$GLB,, | Performed by: NURSE PRACTITIONER

## 2025-05-08 PROCEDURE — 3079F DIAST BP 80-89 MM HG: CPT | Mod: CPTII,S$GLB,, | Performed by: NURSE PRACTITIONER

## 2025-05-08 PROCEDURE — 99999 PR PBB SHADOW E&M-EST. PATIENT-LVL V: CPT | Mod: PBBFAC,,, | Performed by: NURSE PRACTITIONER

## 2025-05-08 PROCEDURE — 1159F MED LIST DOCD IN RCRD: CPT | Mod: CPTII,S$GLB,, | Performed by: NURSE PRACTITIONER

## 2025-05-08 PROCEDURE — 3008F BODY MASS INDEX DOCD: CPT | Mod: CPTII,S$GLB,, | Performed by: NURSE PRACTITIONER

## 2025-05-08 PROCEDURE — 4010F ACE/ARB THERAPY RXD/TAKEN: CPT | Mod: CPTII,S$GLB,, | Performed by: NURSE PRACTITIONER

## 2025-05-08 PROCEDURE — 99214 OFFICE O/P EST MOD 30 MIN: CPT | Mod: S$GLB,,, | Performed by: NURSE PRACTITIONER

## 2025-05-08 NOTE — PROGRESS NOTES
History of Present Illness    CHIEF COMPLAINT:  Patient presents today for evaluation of a hernia following breast reconstruction.    HISTORY OF PRESENT ILLNESS:  She reports a hernia present since breast reconstruction surgery 12 years ago. The condition has worsened and become more pronounced following a 50-pound weight loss. She describes a sensation of the hernia spreading and associated constipation. She denies abdominal pain.    MEDICAL HISTORY:  She has a history of Parkinson's Disease diagnosed 7 years ago, breast cancer with reconstruction 12 years ago, and femur fracture in October.      ROS:  General: -fever, -chills, -fatigue, -weight gain, -weight loss  Eyes: -vision changes, -redness, -discharge  ENT: -ear pain, -nasal congestion, -sore throat  Cardiovascular: -chest pain, -palpitations, -lower extremity edema  Respiratory: -cough, -shortness of breath  Gastrointestinal: -abdominal pain, -nausea, -vomiting, -diarrhea, +constipation, -blood in stool, +hernias  Genitourinary: -dysuria, -hematuria, -frequency  Musculoskeletal: -joint pain, -muscle pain  Skin: -rash, -lesion  Neurological: -headache, -dizziness, -numbness, -tingling  Psychiatric: -anxiety, -depression, -sleep difficulty          Physical Exam    General: No acute distress. Well-developed. Well-nourished.  Eyes: EOMI. Sclerae anicteric.  HENT: Normocephalic. Atraumatic. Nares patent. Moist oral mucosa.  Ears: Bilateral TMs clear. Bilateral EACs clear.  Cardiovascular: Regular rate. Regular rhythm. No murmurs. No rubs. No gallops. Normal S1, S2.  Respiratory: Normal respiratory effort. Clear to auscultation bilaterally. No rales. No rhonchi. No wheezing.  Abdomen: Soft. Non-tender. Non-distended. Normoactive bowel sounds. Abdominal hernia on the right side of abdomen.   Musculoskeletal: No  obvious deformity.  Extremities: No lower extremity edema.  Neurological: Alert & oriented x3. No slurred speech. Normal gait. Slowed  "reactions  Psychiatric: Normal mood. Flat affect. Good insight. Good judgment.  Skin: Warm. Dry. No rash.          Assessment & Plan    1. Ventral hernia without obstruction or gangrene  Ambulatory referral/consult to General Surgery         IMPRESSION:  - Hernia present since breast reconstruction surgery for cancer 12 years ago.  - Pronounced hernia observed, likely exacerbated by recent 50 pound weight loss.  - Constipation issues related to hernia.  - History of Parkinson's disease, diagnosed 7 years ago.    VENTRAL HERNIA:  - Patient reports worsening and more pronounced hernia after 50-pound weight loss.  - Examination revealed a visible, significantly large hernia.  - Referred to Dr. Valdes for evaluation and management of abdominal hernia.    PARKINSONISM:  - Patient was diagnosed with Parkinsonism 7 years ago, with initial symptoms including rigidity and inability to write.  - Previously under Dr. Henry's care and subsequently transferred to Dr. Daley, who specializes in brain stem surgeries.    CONSTIPATION:  - Patient reports experiencing constipation.    HISTORY OF BREAST CANCER:  - Patient had breast cancer 12 years ago and underwent subsequent breast reconstruction.    HISTORY OF FEMUR FRACTURE:  - Patient sustained a femur fracture in October after moving boxes, which was initially undetected by multiple physicians.  - Eventually referred to a specialist in [place] who identified the fracture and performed surgical intervention.       "This note will not be shared with the patient."  This note was generated with the assistance of ambient listening technology. Verbal consent was obtained by the patient and accompanying visitor(s) for the recording of patient appointment to facilitate this note. I attest to having reviewed and edited the generated note for accuracy, though some syntax or spelling errors may persist. Please contact the author of this note for any clarification.        "

## 2025-05-16 ENCOUNTER — PATIENT MESSAGE (OUTPATIENT)
Facility: CLINIC | Age: 60
End: 2025-05-16
Payer: COMMERCIAL

## 2025-05-16 ENCOUNTER — TELEPHONE (OUTPATIENT)
Facility: CLINIC | Age: 60
End: 2025-05-16
Payer: COMMERCIAL

## 2025-05-16 NOTE — TELEPHONE ENCOUNTER
----- Message from Maritza sent at 5/16/2025 12:11 PM CDT -----  Regarding: Late To Appt  Contact: Pt @[phone]  Patient is calling to advise she may be late to her appt on 5/27 because she has a CT scan same day at 1:15pm. Please c/b if pt needs to r/s..thanks

## 2025-05-21 NOTE — TELEPHONE ENCOUNTER
Called in response to pt's concern that she has a CT scan at 1:15 next Tuesday and mirna't with Dr. Gresham at 2 pm.  Called to inform Rachel that we are working on rearranging the schedule so she can be seen at 3 pm.

## 2025-05-22 NOTE — TELEPHONE ENCOUNTER
Received message from manager that appt resched conflicts with botox appt. Message sent to pt to inform her that appt is at 3 pm.

## 2025-05-24 DIAGNOSIS — G20.A1 PARKINSON DISEASE: ICD-10-CM

## 2025-05-26 RX ORDER — AMANTADINE HYDROCHLORIDE 100 MG/1
100 CAPSULE, GELATIN COATED ORAL 2 TIMES DAILY
Qty: 180 CAPSULE | Refills: 3 | Status: SHIPPED | OUTPATIENT
Start: 2025-05-26

## 2025-05-27 ENCOUNTER — OFFICE VISIT (OUTPATIENT)
Facility: CLINIC | Age: 60
End: 2025-05-27
Payer: COMMERCIAL

## 2025-05-27 ENCOUNTER — HOSPITAL ENCOUNTER (OUTPATIENT)
Dept: RADIOLOGY | Facility: HOSPITAL | Age: 60
Discharge: HOME | End: 2025-05-27
Attending: NURSE PRACTITIONER
Payer: COMMERCIAL

## 2025-05-27 VITALS — HEIGHT: 62 IN | WEIGHT: 143.44 LBS | BODY MASS INDEX: 26.39 KG/M2

## 2025-05-27 DIAGNOSIS — Z13.6 ENCOUNTER FOR SCREENING FOR CARDIOVASCULAR DISORDERS: ICD-10-CM

## 2025-05-27 DIAGNOSIS — I95.1 ORTHOSTASIS: ICD-10-CM

## 2025-05-27 DIAGNOSIS — G20.A2 PARKINSON'S DISEASE WITHOUT DYSKINESIA, WITH FLUCTUATING MANIFESTATIONS: Primary | ICD-10-CM

## 2025-05-27 DIAGNOSIS — R41.3 MEMORY CHANGE: ICD-10-CM

## 2025-05-27 PROCEDURE — 99215 OFFICE O/P EST HI 40 MIN: CPT | Mod: S$GLB,,, | Performed by: PSYCHIATRY & NEUROLOGY

## 2025-05-27 PROCEDURE — 75571 CT HRT W/O DYE W/CA TEST: CPT | Mod: 26,,, | Performed by: RADIOLOGY

## 2025-05-27 PROCEDURE — 3008F BODY MASS INDEX DOCD: CPT | Mod: CPTII,S$GLB,, | Performed by: PSYCHIATRY & NEUROLOGY

## 2025-05-27 PROCEDURE — 75571 CT HRT W/O DYE W/CA TEST: CPT | Mod: TC

## 2025-05-27 PROCEDURE — G2211 COMPLEX E/M VISIT ADD ON: HCPCS | Mod: S$GLB,,, | Performed by: PSYCHIATRY & NEUROLOGY

## 2025-05-27 PROCEDURE — 99999 PR PBB SHADOW E&M-EST. PATIENT-LVL III: CPT | Mod: PBBFAC,,, | Performed by: PSYCHIATRY & NEUROLOGY

## 2025-05-27 PROCEDURE — 1159F MED LIST DOCD IN RCRD: CPT | Mod: CPTII,S$GLB,, | Performed by: PSYCHIATRY & NEUROLOGY

## 2025-05-27 PROCEDURE — 4010F ACE/ARB THERAPY RXD/TAKEN: CPT | Mod: CPTII,S$GLB,, | Performed by: PSYCHIATRY & NEUROLOGY

## 2025-05-27 RX ORDER — AMLODIPINE BESYLATE 2.5 MG/1
2.5 TABLET ORAL
Qty: 90 TABLET | Refills: 3 | Status: SHIPPED | OUTPATIENT
Start: 2025-05-27

## 2025-05-27 NOTE — ASSESSMENT & PLAN NOTE
Parkinsonism, familial autosomal dominant. GBA POS [id number]  Pt and family have not yet met with genetic counselor provided by [facility]  I suggested connecting with them and if all needs not met send to [facility] genetic counselor  She may also consider the PROPEL trial  We discussed that testing in non-symptomatic people is a personal choice     Memory better after B12 daily  Continue 1000mcg daily    Continue ryatry 3 tabs 145mg TID  Amantadine 100mg BID  I asked her to pay attention to Ontime        Sent Constipation recipe

## 2025-05-27 NOTE — PROGRESS NOTES
"The patient location is: HOME  The chief complaint leading to visit is: iPD  1. Parkinson's disease without dyskinesia, with fluctuating manifestations        2. Memory change        3. Orthostasis              Visit type: Virtual visit with synchronous audio and video    Face to Face time with patient: 20mins  30 minutes of total time spent on the encounter, which includes face to face time and non-face to face time preparing to see the patient (eg, review of tests), Obtaining and/or reviewing separately obtained history, Documenting clinical information in the electronic or other health record, Independently interpreting results (not separately reported) and communicating results to the patient/family/caregiver, or Care coordination (not separately reported).     Each patient to whom he or she provides medical services by telemedicine is:  (1) informed of the relationship between the physician and patient and the respective role of any other health care provider with respect to management of the patient; and (2) notified that he or she may decline to receive medical services by telemedicine and may withdraw from such care at any time.        MOVEMENT DISORDERS [facility]INIC    PCP/Referring Provider: No referring provider defined for this encounter.  Date of Service: 5/27/2025    Chief Complaint: PD    Interval Hx  Since last visit,     Much more engaged    Doing better now on rytary 3 tabs 145mg TID  7am/1pm/7pm   Amantadine 100mg BID  No hallucinations    Feels dizzy on standing intermittently  Feet burning consistently and affect walking  B12 now repleated    GBA POS  [id number]    She has 2 children  42 and 35yo    No nausea, halluciantions  Walking better  Hand dexterity is better  No dyskinesias    Falls: none  Assist Device: none  Can walk 300 feet before she tires    Med HX  Started ropinirole 0.25mg PO TID and felt better but at 0.5mg PO TID felt worse, was feeling weak and stopped    "PriorHPI: Rachel Mann " "Mel is a R HANDED 60 y.o. female with a medical issues significant for hemochromatosis, HTN, HL, Breast Cancer s/p chemotherapy, who presents with PDism. Notes her PDism began at age 53. R sided rigidity and arm stiffness began and progressed. "Just like mom." At this point has a moderate R hand resting tremor and     Started carbidopa/levodopa 25/100mg 1 tab daily 2017  Currently on Amantadine 100mg Po TID. This helps her tremor. 8/2/PM  No cognitive issues or leg swelling.  Has 4 hour ONtimes with Amantadine  When she was on both carbidopa/levodopa and amantadine she "felt worse"    R foot inversions when OFF  No orthostasis  No gambling tesnencies    [place] descent  Mother and grandmother had PDism"    Review of Systems:   Review of Systems   Constitutional: Negative for fever.   HENT: Negative for congestion.    Eyes: Negative for double vision.   Respiratory: Negative for cough and shortness of breath.    Cardiovascular: Negative for chest pain and leg swelling.   Gastrointestinal: Negative for nausea.   Genitourinary: Negative for dysuria.   Musculoskeletal: Negative for falls.   Skin: Negative for rash.   Neurological: Positive for tremors and speech change. Negative for headaches.   Psychiatric/Behavioral: Negative for depression.         Current Medications:  Outpatient Encounter Medications as of 5/27/2025   Medication Sig Dispense Refill    amantadine HCL (SYMMETREL) 100 mg capsule Take 1 capsule (100 mg total) by mouth 2 (two) times daily. 7 am and 1 pm 180 capsule 3    anastrozole (ARIMIDEX) 1 mg Tab Take 1 mg by mouth once daily.      atorvastatin (LIPITOR) 40 MG tablet Take 1 tablet (40 mg total) by mouth once daily. 90 tablet 0    carbidopa-levodopa (RYTARY) 36. mg CpSR Take 3 capsules by mouth 4 (four) times daily. 360 capsule 12    carbidopa-levodopa  mg (SINEMET)  mg per tablet Take 2 tablets by mouth 4 (four) times daily. 720 tablet 9    diazePAM (VALIUM) 2 MG tablet Take 2 " tablets (4 mg total) by mouth nightly as needed. AS NEEDED FOR INSOMNIA 60 tablet 0    esomeprazole (NEXIUM) 40 MG capsule TAKE 1 CAPSULE BY MOUTH BEFORE BREAKFAST. 90 capsule 2    hydroCHLOROthiazide (HYDRODIURIL) 25 MG tablet Take 1 tablet (25 mg total) by mouth once daily. 60 tablet 5    losartan (COZAAR) 50 MG tablet Take 1 tablet by mouth every morning.      oxybutynin (DITROPAN-XL) 5 MG TR24 Take 5 mg by mouth once daily.      venlafaxine (EFFEXOR-XR) 75 MG 24 hr capsule Take 75 mg by mouth once daily.       vitamin D 1000 units Tab Take 1,000 mg by mouth once daily.      amLODIPine (NORVASC) 2.5 MG tablet Take 1 tablet (2.5 mg total) by mouth once daily. 30 tablet 2    docusate sodium (STOOL SOFTENER ORAL) Take by mouth.      meloxicam (MOBIC) 15 MG tablet Take 1 tablet (15 mg total) by mouth once daily.      [DISCONTINUED] amantadine HCL (SYMMETREL) 100 mg capsule TAKE 1 CAPSULE BY MOUTH THREE TIMES A  capsule 0     No facility-administered encounter medications on file as of 2025.       Past Medical History:  Patient Active Problem List   Diagnosis    Hyperlipidemia    Hx of breast cancer    Neuropathy    Hereditary hemochromatosis    Anxiety    Parkinson disease    Fatty liver    Obesity (BMI 30-39.9)    Condyloma    Benign essential HTN    Right leg numbness    S/P mastectomy    History of cancer metastatic to bone    Balance disorder    Functional gait abnormality    Secondary malignant neoplasm of bone    Memory change    Orthostasis       Past Surgical History:  Past Surgical History:   Procedure Laterality Date    BREAST SURGERY      augmentation     SECTION      COLONOSCOPY      EXCISION OF CONDYLOMA N/A 2019    Procedure: EXCISION OF ANAL CANAL CONDYLOMA;  Surgeon: Yunior Starks MD;  Location: [facility];  Service: Colon and Rectal;  Laterality: N/A;    EYE SURGERY      FOOT SURGERY Right 06/15/2022    FULGURATION OF ANAL CONDYLOMA N/A 2019    Procedure: FULGURATION OF  ANAL CANAL CONDYLOMA;  Surgeon: Yunior Starks MD;  Location: [facility];  Service: Colon and Rectal;  Laterality: N/A;    HAND SURGERY      HYSTERECTOMY      TLH BSO    LIVER BIOPSY  2017    Mild steatosis, macrovesicular 20% and microvesicular 10%. Mild to moderate siderosis, 2+, within hepatocytes. No fibrosis    MASTECTOMY Left     TONSILLECTOMY         Current Living Situation: home    Social:  Social History     Socioeconomic History    Marital status:    Tobacco Use    Smoking status: Former     Current packs/day: 0.00     Types: Cigarettes     Quit date: 2013     Years since quittin.7    Smokeless tobacco: Never   Substance and Sexual Activity    Alcohol use: Yes     Comment: drinking 6 peers per month, no daily use     Drug use: No    Sexual activity: Yes     Partners: Male     Birth control/protection: Surgical     Comment: .     Social Drivers of Health     Financial Resource Strain: Medium Risk (3/3/2025)    Overall Financial Resource Strain (CARDIA)     Difficulty of Paying Living Expenses: Somewhat hard   Food Insecurity: No Food Insecurity (3/3/2025)    Hunger Vital Sign     Worried About Running Out of Food in the Last Year: Never true     Ran Out of Food in the Last Year: Never true   Transportation Needs: No Transportation Needs (3/3/2025)    PRAPARE - Transportation     Lack of Transportation (Medical): No     Lack of Transportation (Non-Medical): No   Physical Activity: Inactive (3/3/2025)    Exercise Vital Sign     Days of Exercise per Week: 0 days     Minutes of Exercise per Session: 30 min   Stress: Stress Concern Present (3/3/2025)    [employer] [place] [employer] - Occupational Stress Questionnaire     Feeling of Stress : Rather much   Housing Stability: Low Risk  (3/3/2025)    Housing Stability Vital Sign     Unable to Pay for Housing in the Last Year: No     Homeless in the Last Year: No       Family History:  Family History   Problem Relation Name Age of  "Onset    Colon cancer Mother      Diabetes Father      Heart disease Father      Hemochromatosis Brother      Cirrhosis Brother          hemochromotosis    Breast cancer Neg Hx      Ovarian cancer Neg Hx         PHYSICAL:  Ht 5' 2" (1.575 m)   Wt 65.1 kg (143 lb 6.6 oz)   LMP 01/17/2012   BMI 26.23 kg/m²     Physical Exam  Constitutional: Well-developed, well-nourished, appears stated age  Eyes: No scleral icterus  ENT: Moist oral mucosa  Cardiovascular: No lower extremity edema   Respiratory: No labored breathing   Skin: No rash   Hematologic: No bruising    Other: GI/ deferred   Mental status: Alert and oriented to person, place, time, and situation;   follows commands, mildly confused  Speech: normal (not dysarthric), no aphasia  Cranial nerves:            CN II: Pupils mid-position and equal, not tested light or accommodation  CN III, IV, VI: Extraocular movements full, no nystagmus visualized  CN V: Not tested   CN VII: Face strong and symmetric bilaterally   CN VIII: Hearing intact to voice and conversation   CN IX, X: Palate raises midline and symmetric   CN XI: Strong shoulder shrug B/L  CN XII: Tongue appears midline   Motor: Normal bulk by appearance, no drift   Sensory: Not tested    Gait: fairly normal (out of proportion) to arms)  Deep tendon reflexes: Not tested  Movement/Coordination                    Mod hypophonic speech.                     Mod facial masking..                   No tremor with rest, posture, kinesis, or intention.   FNF NL  R hand and R foot dystonia mild    L hand tremor mild    ? Finger taps Finger flicks RAE Heel taps   Left 2+ 2+ - 2+   Right 3+ 3+ - 2+     Laboratory Data:  NA    Imaging:  Reported as NL    Assessment//Plan:   Problem List Items Addressed This Visit          Neuro    Parkinson disease - Primary    Current Assessment & Plan   Parkinsonism, familial autosomal dominant. GBA POS [id number]  Pt and family have not yet met with genetic counselor provided by " [facility]  I suggested connecting with them and if all needs not met send to [facility] counselor  She may also consider the PROPEL trial  We discussed that testing in non-symptomatic people is a personal choice     Memory better after B12 daily  Continue 1000mcg daily    Continue ryatry 3 tabs 145mg TID  Amantadine 100mg BID  I asked her to pay attention to Ontime        Sent Constipation recipe         Memory change    Current Assessment & Plan   Continue B12            Cardiac/Vascular    Orthostasis    Current Assessment & Plan   I asked her to take sitting and standing Bps to screen for orthostasis              This visit covered ongoing complex medical needs extending over multiple office visits covering multiple chronic issues.      Anu Gresham MD, MS  [facility]  Department of Neurology  Movement Disorders

## 2025-06-02 ENCOUNTER — TELEPHONE (OUTPATIENT)
Dept: INTERNAL MEDICINE | Facility: CLINIC | Age: 60
End: 2025-06-02
Payer: COMMERCIAL

## 2025-06-04 ENCOUNTER — RESULTS FOLLOW-UP (OUTPATIENT)
Dept: CARDIOLOGY | Facility: HOSPITAL | Age: 60
End: 2025-06-04

## 2025-06-04 DIAGNOSIS — I25.10 CORONARY ARTERY DISEASE, UNSPECIFIED VESSEL OR LESION TYPE, UNSPECIFIED WHETHER ANGINA PRESENT, UNSPECIFIED WHETHER NATIVE OR TRANSPLANTED HEART: Primary | ICD-10-CM

## 2025-06-04 DIAGNOSIS — R93.1 ELEVATED CORONARY ARTERY CALCIUM SCORE: ICD-10-CM

## 2025-06-05 ENCOUNTER — TELEPHONE (OUTPATIENT)
Dept: CARDIOLOGY | Facility: CLINIC | Age: 60
End: 2025-06-05
Payer: COMMERCIAL

## 2025-06-11 ENCOUNTER — OFFICE VISIT (OUTPATIENT)
Dept: NEUROLOGY | Facility: CLINIC | Age: 60
End: 2025-06-11
Payer: COMMERCIAL

## 2025-06-11 VITALS
HEIGHT: 62 IN | RESPIRATION RATE: 18 BRPM | SYSTOLIC BLOOD PRESSURE: 128 MMHG | DIASTOLIC BLOOD PRESSURE: 74 MMHG | HEART RATE: 77 BPM | OXYGEN SATURATION: 99 % | BODY MASS INDEX: 26.41 KG/M2 | WEIGHT: 143.5 LBS

## 2025-06-11 DIAGNOSIS — G20.A2 PARKINSON'S DISEASE WITHOUT DYSKINESIA, WITH FLUCTUATING MANIFESTATIONS: Primary | ICD-10-CM

## 2025-06-11 DIAGNOSIS — R13.19 DYSPHAGIA, NEUROLOGIC: ICD-10-CM

## 2025-06-11 DIAGNOSIS — K59.01 SLOW TRANSIT CONSTIPATION: ICD-10-CM

## 2025-06-11 DIAGNOSIS — G62.9 NEUROPATHY: ICD-10-CM

## 2025-06-11 PROCEDURE — 99999 PR PBB SHADOW E&M-EST. PATIENT-LVL V: CPT | Mod: PBBFAC,,, | Performed by: STUDENT IN AN ORGANIZED HEALTH CARE EDUCATION/TRAINING PROGRAM

## 2025-06-11 PROCEDURE — 3008F BODY MASS INDEX DOCD: CPT | Mod: CPTII,S$GLB,, | Performed by: STUDENT IN AN ORGANIZED HEALTH CARE EDUCATION/TRAINING PROGRAM

## 2025-06-11 PROCEDURE — 4010F ACE/ARB THERAPY RXD/TAKEN: CPT | Mod: CPTII,S$GLB,, | Performed by: STUDENT IN AN ORGANIZED HEALTH CARE EDUCATION/TRAINING PROGRAM

## 2025-06-11 PROCEDURE — 1159F MED LIST DOCD IN RCRD: CPT | Mod: CPTII,S$GLB,, | Performed by: STUDENT IN AN ORGANIZED HEALTH CARE EDUCATION/TRAINING PROGRAM

## 2025-06-11 PROCEDURE — 3074F SYST BP LT 130 MM HG: CPT | Mod: CPTII,S$GLB,, | Performed by: STUDENT IN AN ORGANIZED HEALTH CARE EDUCATION/TRAINING PROGRAM

## 2025-06-11 PROCEDURE — G2211 COMPLEX E/M VISIT ADD ON: HCPCS | Mod: S$GLB,,, | Performed by: STUDENT IN AN ORGANIZED HEALTH CARE EDUCATION/TRAINING PROGRAM

## 2025-06-11 PROCEDURE — 99215 OFFICE O/P EST HI 40 MIN: CPT | Mod: S$GLB,,, | Performed by: STUDENT IN AN ORGANIZED HEALTH CARE EDUCATION/TRAINING PROGRAM

## 2025-06-11 PROCEDURE — 3078F DIAST BP <80 MM HG: CPT | Mod: CPTII,S$GLB,, | Performed by: STUDENT IN AN ORGANIZED HEALTH CARE EDUCATION/TRAINING PROGRAM

## 2025-06-11 RX ORDER — LIFITEGRAST 50 MG/ML
1 SOLUTION/ DROPS OPHTHALMIC 2 TIMES DAILY
COMMUNITY
Start: 2025-06-04

## 2025-06-11 RX ORDER — VALACYCLOVIR HYDROCHLORIDE 1 G/1
1000 TABLET, FILM COATED ORAL 2 TIMES DAILY PRN
COMMUNITY
Start: 2025-05-20

## 2025-06-11 RX ORDER — MULTIVITAMIN WITH IRON
1 TABLET ORAL DAILY
COMMUNITY

## 2025-06-11 NOTE — PATIENT INSTRUCTIONS
1) Try taking your 1pm dose either 45 minutes before or 1 hour after food to see if this makes it kick in better.     2) Exercise for Parkinson's -  every Wednesday from 12 - 1 -- contact the physical therapy office ([facility]) to sign up.     3) Support group every last wed of the month from 4-5pm at the hospital     4) Try lidocaine cream or Magnilife Foot cream before bed.    Keep all meds the same for now.

## 2025-06-11 NOTE — PROGRESS NOTES
"Name: Rachel Leal  MRN: [id number]   CSN: [id number]      Date: 06/11/2025    Chief Complaint / Interval History: Parkinsonism      History of Present Illness (HPI):    Ms. Leal is a 61 yo RH woman with hemochromatosis, HTN, HLD, Breast CA s/p chemo and PD (GBA+). She is a patient of Dr. Gresham. Last saw her just a few weeks ago.     Symptom onset was around age 53 with right sided rigidity as well as tremor. Both her mother, her brother and her maternal grandmother had parkinsonism. Completed [facility] - pending genetic counselor.     Some burning in her feet at times. B12 is being repleted. Also describes discomfort to touch involving her legs primarily at night in bed.     Constipation is currently under decent control.     Overall she states that her current medication regimen is working well for her however she does hit a "slump" from 1-2pm or so. Her mid-day dose is the only dose where she eats a high protein meal.     Current Mvmt Medications:  Amantadine 100mg BID (8am/1pm)  Rytary 3 of the 145mg capsules TID (8am/1pm/8pm)  Diazepam PRN  Effexor   Oxybutynin    Prior Mvmt Medication Trials:  Ropinirole - caused weakness  Sinemet 25/100 2 tabs QID    Nonmotor ROS:  Smell/Taste: taste altered, smells is ok  Voice/Swallowing: hypophonia,has been choking for a long time - especially cold stuff   - no ST for voice  - never had swallowing difficulties   Gait/Falls: walks well with her medications but without she drags her right foot - some freezing of the right foot when off   - Has done home health PT   Exercise: stays active   Dizziness: upon standing occasionally   Hydration: does well   Urinary Issues: does well with it   Constipation: fiber is helping - having 3BM/week  Sleep/RBD: hollers in her sleep at night   - PRN melatonin   Hallucinations/Peripheral Illusions: no hallucinations  - only previously with polypharmacy   Memory/Cognition/Language: forgetful at times   Mood: stable     Past " "Medical History: The patient  has a past medical history of Cancer, Fatty liver, Hereditary hemochromatosis (2015), History of TMJ syndrome, Hyperlipidemia, Hypertension, Hypoglycemia, Osteoarthritis, Parkinson disease, and Torn meniscus.    Relevant Surgical History:   Past Surgical History:   Procedure Laterality Date    BREAST SURGERY      augmentation     SECTION      COLONOSCOPY      EXCISION OF CONDYLOMA N/A 2019    Procedure: EXCISION OF ANAL CANAL CONDYLOMA;  Surgeon: Yunior Starks MD;  Location: [facility];  Service: Colon and Rectal;  Laterality: N/A;    EYE SURGERY      FEMUR SURGERY Right 2024    pop placed    FOOT SURGERY Right 06/15/2022    FULGURATION OF ANAL CONDYLOMA N/A 2019    Procedure: FULGURATION OF ANAL CANAL CONDYLOMA;  Surgeon: Yunior Starks MD;  Location: [facility];  Service: Colon and Rectal;  Laterality: N/A;    HAND SURGERY      HYSTERECTOMY      TLH BSO    LIVER BIOPSY  2017    Mild steatosis, macrovesicular 20% and microvesicular 10%. Mild to moderate siderosis, 2+, within hepatocytes. No fibrosis    MASTECTOMY Left     TONSILLECTOMY         Social History: The patient  reports that she quit smoking about 11 years ago. Her smoking use included cigarettes. She has never used smokeless tobacco. She reports current alcohol use. She reports that she does not use drugs.    Family History: Their family history includes Cirrhosis in her brother; Colon cancer in her mother; Diabetes in her father; Heart disease in her father; Hemochromatosis in her brother.    Allergies: Neupro [rotigotine]     Meds: Medications Ordered Prior to Encounter[1]    Exam:  /74 (BP Location: Right arm, Patient Position: Sitting)   Pulse 77   Resp 18   Ht 5' 2" (1.575 m)   Wt 65.1 kg (143 lb 8.3 oz)   LMP 2012   SpO2 99%   BMI 26.25 kg/m²     Constitutional  Well-developed, well-nourished, appears stated age   Cardiovascular  No LE edema bilaterally   Neurological  "   * Mental status  MOCA = not done during today's visit     - Orientation  Oriented to conversation     - Memory   Intact recent and remote     - Attention/concentration  Attentive, vigilant during exam     - Language  Intact to conversation.     - Fund of knowledge  Aware of current events     - Executive  Well-organized thoughts     - Other     * Cranial nerves       - CN II  Pupils equal, visual fields full to confrontation     - CN III, IV, VI  Extraocular movements full, normal pursuits and saccades         - CN VII  Face strong and symmetric bilaterally     - CN VIII  Hearing intact bilaterally         - CN XI  SCM and trapezius 5/5 bilaterally       * Motor  Grossly intact       * Coordination  No dysmetria with finger-to-nose   * Gait  See below.   * Deep tendon reflexes     * Specialized movement exam Gen: masked facies and reduced blink   Speech: hypophonic  Tremor: very mild tremor at rest R>L - no action or postural tremor  Bradykinesia: R>>L with decrement both upper and lower extremity with decrement   Tone: increased t/o R>L - mild  Gait: slow, reduced arm swing bilaterally, en bloc turning, slightly stooped - walks unassisted   Pull Test: did not assess today     Medical Record Review:  Labs, imaging and prior notes reviewed independently.       Diagnoses:          1. Parkinson's disease without dyskinesia, with fluctuating manifestations        2. Neuropathy        3. Dysphagia, neurologic        4. Slow transit constipation            Assessment:      Plan:  - Keep current medication unchanged. Tolerating Rytary and Amantadine well. Can try  food from her 1pm dose to see if she feels that dose is more effective. Discussed the interaction of protein with levodopa. Protein still very important for her.   - Continue fiber for constipation. Goal 3 BM/day.   - For neuropathic complaints - suggested topical cream to begin with. May consider compound cream in the future if this is helpful.   -  Discussed our support group and exercise class locally.   - In the future she would benefit from neuro specific PT and ST as well.    - Continue to monitor dysphagia, chronic.     The visit today is associated with current or anticipated ongoing medical care related to this patients complex condition. Patient should RTC in 3-4 months to see me.     Total time: 53 minutes spent on the encounter, which includes face to face time and non-face to face time preparing to see the patient (eg, review of tests), Obtaining and/or reviewing separately obtained history, Documenting clinical information in the electronic or other health record, Independently interpreting results (not separately reported) and communicating results to the patient/family/caregiver, or Care coordination (not separately reported).     Cata Pablo MD  Division of Movement and Memory Disorders  [facility]  [phone]           [1]   Current Outpatient Medications on File Prior to Visit   Medication Sig Dispense Refill    amantadine HCL (SYMMETREL) 100 mg capsule Take 1 capsule (100 mg total) by mouth 2 (two) times daily. 7 am and 1 pm 180 capsule 3    amLODIPine (NORVASC) 2.5 MG tablet TAKE 1 TABLET BY MOUTH EVERY DAY 90 tablet 3    anastrozole (ARIMIDEX) 1 mg Tab Take 1 mg by mouth once daily.      ascorbic acid, vitamin C, (VITAMIN C) 100 MG tablet Take 100 mg by mouth once daily.      atorvastatin (LIPITOR) 40 MG tablet Take 1 tablet (40 mg total) by mouth once daily. 90 tablet 0    carbidopa-levodopa (RYTARY) 36. mg CpSR Take 3 capsules by mouth 4 (four) times daily. 360 capsule 12    diazePAM (VALIUM) 2 MG tablet Take 2 tablets (4 mg total) by mouth nightly as needed. AS NEEDED FOR INSOMNIA 60 tablet 0    esomeprazole (NEXIUM) 40 MG capsule TAKE 1 CAPSULE BY MOUTH BEFORE BREAKFAST. 90 capsule 2    hydroCHLOROthiazide (HYDRODIURIL) 25 MG tablet Take 1 tablet (25 mg total) by mouth once daily. 60 tablet 5    losartan  (COZAAR) 50 MG tablet Take 1 tablet by mouth every morning.      omega-3 fatty acids/fish oil (FISH OIL-OMEGA-3 FATTY ACIDS) 300-1,000 mg capsule Take 1 capsule by mouth once daily.      oxybutynin (DITROPAN-XL) 5 MG TR24 Take 5 mg by mouth once daily.      valACYclovir (VALTREX) 1000 MG tablet Take 1,000 mg by mouth 2 (two) times daily as needed.      venlafaxine (EFFEXOR-XR) 75 MG 24 hr capsule Take 75 mg by mouth once daily.       vitamin D 1000 units Tab Take 1,000 mg by mouth once daily.      XIIDRA 5 % Dpet Place 1 drop into both eyes 2 (two) times daily.      [DISCONTINUED] carbidopa-levodopa  mg (SINEMET)  mg per tablet Take 2 tablets by mouth 4 (four) times daily. (Patient not taking: Reported on 6/11/2025) 720 tablet 9    [DISCONTINUED] docusate sodium (STOOL SOFTENER ORAL) Take by mouth.      [DISCONTINUED] meloxicam (MOBIC) 15 MG tablet Take 1 tablet (15 mg total) by mouth once daily.       No current facility-administered medications on file prior to visit.

## 2025-06-12 NOTE — TELEPHONE ENCOUNTER
No care due was identified.  [facility] Embedded Care Due Messages. Reference number: [id number].   6/12/2025 11:31:23 AM CDT

## 2025-06-13 RX ORDER — LOSARTAN POTASSIUM 50 MG/1
100 TABLET ORAL
Qty: 180 TABLET | Refills: 5 | Status: SHIPPED | OUTPATIENT
Start: 2025-06-13

## 2025-06-22 DIAGNOSIS — E78.5 HYPERLIPIDEMIA, UNSPECIFIED HYPERLIPIDEMIA TYPE: ICD-10-CM

## 2025-06-22 NOTE — TELEPHONE ENCOUNTER
No care due was identified.  [facility] Embedded Care Due Messages. Reference number: [id number].   6/22/2025 6:49:54 AM CDT

## 2025-06-23 RX ORDER — ATORVASTATIN CALCIUM 40 MG/1
40 TABLET, FILM COATED ORAL
Qty: 90 TABLET | Refills: 1 | Status: SHIPPED | OUTPATIENT
Start: 2025-06-23

## 2025-07-10 ENCOUNTER — OFFICE VISIT (OUTPATIENT)
Dept: CARDIOLOGY | Facility: CLINIC | Age: 60
End: 2025-07-10
Payer: COMMERCIAL

## 2025-07-10 VITALS
OXYGEN SATURATION: 98 % | SYSTOLIC BLOOD PRESSURE: 124 MMHG | DIASTOLIC BLOOD PRESSURE: 78 MMHG | BODY MASS INDEX: 26.53 KG/M2 | HEIGHT: 62 IN | WEIGHT: 144.19 LBS | RESPIRATION RATE: 18 BRPM | HEART RATE: 75 BPM

## 2025-07-10 DIAGNOSIS — E78.5 HYPERLIPIDEMIA, UNSPECIFIED HYPERLIPIDEMIA TYPE: ICD-10-CM

## 2025-07-10 DIAGNOSIS — Z13.6 ENCOUNTER FOR SCREENING FOR CARDIOVASCULAR DISORDERS: Primary | ICD-10-CM

## 2025-07-10 DIAGNOSIS — I10 BENIGN ESSENTIAL HTN: ICD-10-CM

## 2025-07-10 DIAGNOSIS — I95.1 ORTHOSTASIS: ICD-10-CM

## 2025-07-10 DIAGNOSIS — I25.10 CORONARY ARTERY DISEASE, UNSPECIFIED VESSEL OR LESION TYPE, UNSPECIFIED WHETHER ANGINA PRESENT, UNSPECIFIED WHETHER NATIVE OR TRANSPLANTED HEART: ICD-10-CM

## 2025-07-10 DIAGNOSIS — R07.9 CHEST PAIN, UNSPECIFIED TYPE: ICD-10-CM

## 2025-07-10 DIAGNOSIS — R93.1 ELEVATED CORONARY ARTERY CALCIUM SCORE: ICD-10-CM

## 2025-07-10 PROCEDURE — 3074F SYST BP LT 130 MM HG: CPT | Mod: CPTII,S$GLB,, | Performed by: NURSE PRACTITIONER

## 2025-07-10 PROCEDURE — 99999 PR PBB SHADOW E&M-EST. PATIENT-LVL IV: CPT | Mod: PBBFAC,,, | Performed by: NURSE PRACTITIONER

## 2025-07-10 PROCEDURE — 1159F MED LIST DOCD IN RCRD: CPT | Mod: CPTII,S$GLB,, | Performed by: NURSE PRACTITIONER

## 2025-07-10 PROCEDURE — 4010F ACE/ARB THERAPY RXD/TAKEN: CPT | Mod: CPTII,S$GLB,, | Performed by: NURSE PRACTITIONER

## 2025-07-10 PROCEDURE — 3078F DIAST BP <80 MM HG: CPT | Mod: CPTII,S$GLB,, | Performed by: NURSE PRACTITIONER

## 2025-07-10 PROCEDURE — 99214 OFFICE O/P EST MOD 30 MIN: CPT | Mod: S$GLB,,, | Performed by: NURSE PRACTITIONER

## 2025-07-10 PROCEDURE — 3008F BODY MASS INDEX DOCD: CPT | Mod: CPTII,S$GLB,, | Performed by: NURSE PRACTITIONER

## 2025-07-10 RX ORDER — ASPIRIN 81 MG/1
81 TABLET ORAL DAILY
Start: 2025-07-10 | End: 2026-07-10

## 2025-07-10 NOTE — PROGRESS NOTES
Cardiology Clinic note    Subjective:   Patient ID:  Rachel Leal is a 60 y.o. female who presents for follow-up of hypertension, hyperlipidemia, testing results    HPI:   Rachel Leal  has a past medical history of Cancer, Fatty liver, Hereditary hemochromatosis (12/16/2015), History of TMJ syndrome, Hyperlipidemia, Hypertension, Hypoglycemia, Osteoarthritis, Parkinson disease, and Torn meniscus.    Here for follow-up of hypertension and testing results fir c/o chest pain; somewhat chronic due to breast cancer- no further  Calcium score 5/2025 1060; ASA initiated     HLD; on statin and tolerating  Lipids stable     HTN; Takes losartan and had amlodipine 2.5 mg added last visit     Stage 4 breast CA; XGIVA treatment   Parkinsons disease  Hypercalcemia; thought to be related to dehydration         Patient Active Problem List    Diagnosis Date Noted    Elevated coronary artery calcium score 07/24/2025    Coronary artery disease 07/24/2025    Orthostasis 05/27/2025    Memory change 04/14/2025    Secondary malignant neoplasm of bone 03/26/2024    Balance disorder 09/18/2023    Functional gait abnormality 09/18/2023    History of cancer metastatic to bone 01/18/2023    S/P mastectomy 04/27/2022    Right leg numbness 01/11/2022    Benign essential HTN 06/14/2021    Condyloma 01/31/2019    Fatty liver 01/03/2018     Liver biopsy 12/2017 - Mild steatosis, macrovesicular 20% and microvesicular 10%. Mild to moderate siderosis, 2+, within hepatocytes. No fibrosis      Obesity (BMI 30-39.9) 01/03/2018    Parkinson disease 11/08/2016    Anxiety 06/24/2016    Hereditary hemochromatosis 12/16/2015     Liver biopsy 12/2017 - Mild steatosis, macrovesicular 20% and microvesicular 10%. Mild to moderate siderosis, 2+, within hepatocytes. No fibrosis      Neuropathy 09/25/2014    Hx of breast cancer 09/12/2014    Hyperlipidemia 01/02/2013       Patient's Medications   New Prescriptions    ASPIRIN (ECOTRIN) 81 MG EC  TABLET    Take 1 tablet (81 mg total) by mouth once daily.   Previous Medications    AMANTADINE HCL (SYMMETREL) 100 MG CAPSULE    Take 1 capsule (100 mg total) by mouth 2 (two) times daily. 7 am and 1 pm    AMLODIPINE (NORVASC) 2.5 MG TABLET    TAKE 1 TABLET BY MOUTH EVERY DAY    ANASTROZOLE (ARIMIDEX) 1 MG TAB    Take 1 mg by mouth once daily.    ASCORBIC ACID, VITAMIN C, (VITAMIN C) 100 MG TABLET    Take 100 mg by mouth once daily.    ATORVASTATIN (LIPITOR) 40 MG TABLET    TAKE 1 TABLET BY MOUTH EVERY DAY    CARBIDOPA-LEVODOPA (RYTARY) 36. MG CPSR    Take 3 capsules by mouth 4 (four) times daily.    DIAZEPAM (VALIUM) 2 MG TABLET    Take 2 tablets (4 mg total) by mouth nightly as needed. AS NEEDED FOR INSOMNIA    ESOMEPRAZOLE (NEXIUM) 40 MG CAPSULE    TAKE 1 CAPSULE BY MOUTH BEFORE BREAKFAST.    HYDROCHLOROTHIAZIDE (HYDRODIURIL) 25 MG TABLET    Take 1 tablet (25 mg total) by mouth once daily.    LOSARTAN (COZAAR) 50 MG TABLET    TAKE 2 TABLETS BY MOUTH ONCE DAILY    OMEGA-3 FATTY ACIDS/FISH OIL (FISH OIL-OMEGA-3 FATTY ACIDS) 300-1,000 MG CAPSULE    Take 1 capsule by mouth once daily.    OXYBUTYNIN (DITROPAN-XL) 5 MG TR24    Take 5 mg by mouth once daily.    VALACYCLOVIR (VALTREX) 1000 MG TABLET    Take 1,000 mg by mouth 2 (two) times daily as needed.    VENLAFAXINE (EFFEXOR-XR) 75 MG 24 HR CAPSULE    Take 75 mg by mouth once daily.     VITAMIN D 1000 UNITS TAB    Take 1,000 mg by mouth once daily.    XIIDRA 5 % DPET    Place 1 drop into both eyes 2 (two) times daily.   Modified Medications    No medications on file   Discontinued Medications    No medications on file        Review of Systems   Constitutional: Negative.   Cardiovascular:  Negative for chest pain, claudication, cyanosis, dyspnea on exertion, irregular heartbeat, leg swelling, near-syncope, orthopnea, palpitations, paroxysmal nocturnal dyspnea and syncope.   Respiratory: Negative.     Musculoskeletal: Negative.          Objective:  "  Vitals  Vitals:    07/10/25 1305   BP: 124/78   Pulse: 75   Resp: 18   SpO2: 98%   Weight: 65.4 kg (144 lb 2.9 oz)   Height: 5' 2" (1.575 m)          Physical Exam  Cardiovascular:      Rate and Rhythm: Normal rate and regular rhythm.      Pulses: Normal pulses.      Heart sounds: Normal heart sounds.   Musculoskeletal:         General: Normal range of motion.   Skin:     General: Skin is warm.   Neurological:      Mental Status: She is alert.           Lab Results    Lab Results   Component Value Date    WBC 7.08 03/23/2025    HGB 11.2 (L) 03/23/2025    HGB 12.8 02/27/2025    HCT 33.4 (L) 03/23/2025    HCT 38.1 02/27/2025    MCV 87 03/23/2025    MCV 90 02/27/2025       Lab Results   Component Value Date     03/23/2025     02/27/2025    INR 1.0 12/04/2019       Lab Results   Component Value Date    K 2.9 (L) 03/23/2025    K 3.2 (L) 02/27/2025    MG 1.4 (L) 03/23/2025    BUN 14 03/23/2025    CREATININE 1.2 03/23/2025       Lab Results   Component Value Date     03/23/2025    GLU 79 02/27/2025    HGBA1C 5.4 03/23/2024       Lab Results   Component Value Date    AST 9 (L) 03/23/2025    AST 8 (L) 02/27/2025    ALT 5 (L) 03/23/2025    ALT <5 (L) 02/27/2025    ALBUMIN 3.6 03/23/2025    ALBUMIN 3.7 02/27/2025    PROT 6.8 03/23/2025    PROT 7.0 02/27/2025       Lab Results   Component Value Date    CHOL 153 01/18/2025    HDL 54 01/18/2025    LDLCALC 77.4 01/18/2025    TRIG 108 01/18/2025       Lab Results   Component Value Date    BNP 11 02/27/2025         Assessment:     Problem List Items Addressed This Visit       Hyperlipidemia    Benign essential HTN    Orthostasis    Elevated coronary artery calcium score    Coronary artery disease     Other Visit Diagnoses         Encounter for screening for cardiovascular disorders    -  Primary      Chest pain, unspecified type                Plan:     Start aspirin 81 mg   Check MPI given elevated calcium score in previous complaints of chest pain  Continue " with current medical plan and lifestyle changes.      Orders Placed This Encounter   Procedures    NM Myocardial Perfusion Spect Multi Pharmacologic     Standing Status:   Future     Expected Date:   7/10/2025     Expiration Date:   7/10/2026     May the Radiologist modify the order per protocol to meet the clinical needs of the patient?:   Yes     Stress Medication to use::   Regadenoson     Diabetes?:   No    Nuclear Stress Test     Standing Status:   Future     Expiration Date:   7/10/2026     Which stress agent will be used?:   Pharm     Which medicaton for the stress procedure?:   Regadenoson     Release to patient:   Immediate       Follow up in 6m   Return sooner for concerns or questions. If symptoms persist go to the ED    She expressed verbal understanding and agreed with the plan    Thank you for the opportunity to care for this patient. Will be available for questions if needed.     Total duration of face to face visit time 30 minutes.  Total time spent counseling greater than fifty percent of total visit time.  Counseling included discussion regarding imaging findings, diagnosis, possibilities, treatment options, risks and benefits.    CARLENE Gallo-JOHNNY  Cardiology Clinic  [facility]

## 2025-07-17 ENCOUNTER — HOSPITAL ENCOUNTER (OUTPATIENT)
Dept: RADIOLOGY | Facility: HOSPITAL | Age: 60
Discharge: HOME | End: 2025-07-17
Attending: PHYSICIAN ASSISTANT
Payer: COMMERCIAL

## 2025-07-17 DIAGNOSIS — M81.0 AGE-RELATED OSTEOPOROSIS WITHOUT CURRENT PATHOLOGICAL FRACTURE: ICD-10-CM

## 2025-07-17 PROCEDURE — 77080 DXA BONE DENSITY AXIAL: CPT | Mod: 26,,, | Performed by: RADIOLOGY

## 2025-07-17 PROCEDURE — 77080 DXA BONE DENSITY AXIAL: CPT | Mod: TC

## 2025-07-24 PROBLEM — R93.1 ELEVATED CORONARY ARTERY CALCIUM SCORE: Status: ACTIVE | Noted: 2025-07-24

## 2025-07-24 PROBLEM — I25.10 CORONARY ARTERY DISEASE: Status: ACTIVE | Noted: 2025-07-24

## 2025-07-29 ENCOUNTER — HOSPITAL ENCOUNTER (OUTPATIENT)
Dept: RADIOLOGY | Facility: HOSPITAL | Age: 60
Discharge: HOME | End: 2025-07-29
Attending: NURSE PRACTITIONER
Payer: COMMERCIAL

## 2025-07-29 ENCOUNTER — HOSPITAL ENCOUNTER (OUTPATIENT)
Dept: PULMONOLOGY | Facility: HOSPITAL | Age: 60
Discharge: HOME | End: 2025-07-29
Attending: NURSE PRACTITIONER
Payer: COMMERCIAL

## 2025-07-29 DIAGNOSIS — Z13.6 ENCOUNTER FOR SCREENING FOR CARDIOVASCULAR DISORDERS: ICD-10-CM

## 2025-07-29 DIAGNOSIS — I25.10 CORONARY ARTERY DISEASE, UNSPECIFIED VESSEL OR LESION TYPE, UNSPECIFIED WHETHER ANGINA PRESENT, UNSPECIFIED WHETHER NATIVE OR TRANSPLANTED HEART: ICD-10-CM

## 2025-07-29 DIAGNOSIS — R93.1 ELEVATED CORONARY ARTERY CALCIUM SCORE: ICD-10-CM

## 2025-07-29 LAB
CV STRESS BASE HR: 62 BPM
DIASTOLIC BLOOD PRESSURE: 71 MMHG
OHS CV CPX 1 MINUTE RECOVERY HEART RATE: 91 BPM
OHS CV CPX 85 PERCENT MAX PREDICTED HEART RATE MALE: 134
OHS CV CPX ESTIMATED METS: 1
OHS CV CPX MAX PREDICTED HEART RATE: 158
OHS CV CPX PATIENT IS FEMALE: 1
OHS CV CPX PATIENT IS MALE: 0
OHS CV CPX PEAK DIASTOLIC BLOOD PRESSURE: 66 MMHG
OHS CV CPX PEAK HEAR RATE: 94 BPM
OHS CV CPX PEAK RATE PRESSURE PRODUCT: 9870
OHS CV CPX PEAK SYSTOLIC BLOOD PRESSURE: 105 MMHG
OHS CV CPX PERCENT MAX PREDICTED HEART RATE ACHIEVED: 62
OHS CV CPX RATE PRESSURE PRODUCT PRESENTING: 7688
STRESS ECHO POST EXERCISE DUR MIN: 2 MINUTES
SYSTOLIC BLOOD PRESSURE: 124 MMHG

## 2025-07-29 PROCEDURE — 93018 CV STRESS TEST I&R ONLY: CPT | Mod: ,,, | Performed by: INTERNAL MEDICINE

## 2025-07-29 PROCEDURE — 63600175 PHARM REV CODE 636 W HCPCS: Performed by: NURSE PRACTITIONER

## 2025-07-29 PROCEDURE — A9500 TC99M SESTAMIBI: HCPCS | Performed by: NURSE PRACTITIONER

## 2025-07-29 PROCEDURE — 78452 HT MUSCLE IMAGE SPECT MULT: CPT | Mod: 26,,, | Performed by: NUCLEAR MEDICINE

## 2025-07-29 PROCEDURE — 93016 CV STRESS TEST SUPVJ ONLY: CPT | Mod: ,,, | Performed by: INTERNAL MEDICINE

## 2025-07-29 PROCEDURE — 78452 HT MUSCLE IMAGE SPECT MULT: CPT | Mod: TC

## 2025-07-29 PROCEDURE — 93017 CV STRESS TEST TRACING ONLY: CPT

## 2025-07-29 RX ORDER — REGADENOSON 0.08 MG/ML
0.4 INJECTION, SOLUTION INTRAVENOUS ONCE
Status: COMPLETED | OUTPATIENT
Start: 2025-07-29 | End: 2025-07-29

## 2025-07-29 RX ORDER — TETRAKIS(2-METHOXYISOBUTYLISOCYANIDE)COPPER(I) TETRAFLUOROBORATE 1 MG/ML
10.5 INJECTION, POWDER, LYOPHILIZED, FOR SOLUTION INTRAVENOUS
Status: COMPLETED | OUTPATIENT
Start: 2025-07-29 | End: 2025-07-29

## 2025-07-29 RX ORDER — TETRAKIS(2-METHOXYISOBUTYLISOCYANIDE)COPPER(I) TETRAFLUOROBORATE 1 MG/ML
30.1 INJECTION, POWDER, LYOPHILIZED, FOR SOLUTION INTRAVENOUS
Status: COMPLETED | OUTPATIENT
Start: 2025-07-29 | End: 2025-07-29

## 2025-07-29 RX ADMIN — KIT FOR THE PREPARATION OF TECHNETIUM TC99M SESTAMIBI 30.1 MILLICURIE: 1 INJECTION, POWDER, LYOPHILIZED, FOR SOLUTION PARENTERAL at 02:07

## 2025-07-29 RX ADMIN — REGADENOSON 0.4 MG: 0.08 INJECTION, SOLUTION INTRAVENOUS at 01:07

## 2025-07-29 RX ADMIN — KIT FOR THE PREPARATION OF TECHNETIUM TC99M SESTAMIBI 10.5 MILLICURIE: 1 INJECTION, POWDER, LYOPHILIZED, FOR SOLUTION PARENTERAL at 01:07

## 2025-07-31 ENCOUNTER — TELEPHONE (OUTPATIENT)
Dept: CARDIOLOGY | Facility: CLINIC | Age: 60
End: 2025-07-31
Payer: COMMERCIAL

## 2025-07-31 DIAGNOSIS — F41.9 ANXIETY: ICD-10-CM

## 2025-07-31 DIAGNOSIS — G20.A2 PARKINSON'S DISEASE WITHOUT DYSKINESIA, WITH FLUCTUATING MANIFESTATIONS: ICD-10-CM

## 2025-07-31 RX ORDER — DIAZEPAM 2 MG/1
4 TABLET ORAL NIGHTLY PRN
Qty: 60 TABLET | Refills: 0 | Status: SHIPPED | OUTPATIENT
Start: 2025-07-31

## 2025-07-31 NOTE — TELEPHONE ENCOUNTER
----- Message from Khadijah Jeff NP sent at 7/31/2025 12:12 PM CDT -----  Nuclear test normal and does not indicate significant blockages to your heart.   ----- Message -----  From: Interface, Rad Results In  Sent: 7/29/2025   2:27 PM CDT  To: Khadijah Jeff NP

## 2025-07-31 NOTE — TELEPHONE ENCOUNTER
No care due was identified.  [facility] Embedded Care Due Messages. Reference number: [id number].   7/31/2025 11:15:19 AM CDT

## 2025-08-18 ENCOUNTER — TELEPHONE (OUTPATIENT)
Dept: INTERNAL MEDICINE | Facility: CLINIC | Age: 60
End: 2025-08-18
Payer: COMMERCIAL

## 2025-08-18 DIAGNOSIS — G20.A2 PARKINSON'S DISEASE WITHOUT DYSKINESIA, WITH FLUCTUATING MANIFESTATIONS: Primary | ICD-10-CM

## 2025-08-18 DIAGNOSIS — E78.5 HYPERLIPIDEMIA, UNSPECIFIED HYPERLIPIDEMIA TYPE: ICD-10-CM

## 2025-08-18 DIAGNOSIS — E83.110 HEREDITARY HEMOCHROMATOSIS: ICD-10-CM

## 2025-08-18 DIAGNOSIS — I10 BENIGN ESSENTIAL HTN: ICD-10-CM

## 2025-08-20 ENCOUNTER — PATIENT MESSAGE (OUTPATIENT)
Dept: INTERNAL MEDICINE | Facility: CLINIC | Age: 60
End: 2025-08-20
Payer: COMMERCIAL

## 2025-08-20 ENCOUNTER — LAB VISIT (OUTPATIENT)
Dept: LAB | Facility: HOSPITAL | Age: 60
End: 2025-08-20
Attending: INTERNAL MEDICINE
Payer: COMMERCIAL

## 2025-08-20 DIAGNOSIS — E83.110 HEREDITARY HEMOCHROMATOSIS: ICD-10-CM

## 2025-08-20 DIAGNOSIS — G20.A2 PARKINSON'S DISEASE WITHOUT DYSKINESIA, WITH FLUCTUATING MANIFESTATIONS: ICD-10-CM

## 2025-08-20 DIAGNOSIS — I10 BENIGN ESSENTIAL HTN: ICD-10-CM

## 2025-08-20 DIAGNOSIS — E78.5 HYPERLIPIDEMIA, UNSPECIFIED HYPERLIPIDEMIA TYPE: ICD-10-CM

## 2025-08-20 LAB
ABSOLUTE EOSINOPHIL (OHS): 0.12 K/UL
ABSOLUTE MONOCYTE (OHS): 0.42 K/UL (ref 0.3–1)
ABSOLUTE NEUTROPHIL COUNT (OHS): 4.76 K/UL (ref 1.8–7.7)
ALBUMIN SERPL BCP-MCNC: 3.8 G/DL (ref 3.5–5.2)
ALP SERPL-CCNC: 214 UNIT/L (ref 40–150)
ALT SERPL W/O P-5'-P-CCNC: <8 UNIT/L (ref 10–44)
ANION GAP (OHS): 10 MMOL/L (ref 8–16)
AST SERPL-CCNC: 20 UNIT/L (ref 11–45)
BASOPHILS # BLD AUTO: 0.02 K/UL
BASOPHILS NFR BLD AUTO: 0.3 %
BILIRUB SERPL-MCNC: 0.7 MG/DL (ref 0.1–1)
BUN SERPL-MCNC: 23 MG/DL (ref 8–23)
CALCIUM SERPL-MCNC: 9.6 MG/DL (ref 8.7–10.5)
CHLORIDE SERPL-SCNC: 104 MMOL/L (ref 95–110)
CHOLEST SERPL-MCNC: 143 MG/DL (ref 120–199)
CHOLEST/HDLC SERPL: 2.4 {RATIO} (ref 2–5)
CO2 SERPL-SCNC: 26 MMOL/L (ref 23–29)
CREAT SERPL-MCNC: 0.7 MG/DL (ref 0.5–1.4)
EAG (OHS): 108 MG/DL (ref 68–131)
ERYTHROCYTE [DISTWIDTH] IN BLOOD BY AUTOMATED COUNT: 12.3 % (ref 11.5–14.5)
FERRITIN SERPL-MCNC: 140 NG/ML (ref 20–300)
GFR SERPLBLD CREATININE-BSD FMLA CKD-EPI: >60 ML/MIN/1.73/M2
GLUCOSE SERPL-MCNC: 91 MG/DL (ref 70–110)
HBA1C MFR BLD: 5.4 % (ref 4–5.6)
HCT VFR BLD AUTO: 35.2 % (ref 37–48.5)
HDLC SERPL-MCNC: 60 MG/DL (ref 40–75)
HDLC SERPL: 42 % (ref 20–50)
HGB BLD-MCNC: 12.2 GM/DL (ref 12–16)
IMM GRANULOCYTES # BLD AUTO: 0.03 K/UL (ref 0–0.04)
IMM GRANULOCYTES NFR BLD AUTO: 0.5 % (ref 0–0.5)
LDLC SERPL CALC-MCNC: 65 MG/DL (ref 63–159)
LYMPHOCYTES # BLD AUTO: 1.25 K/UL (ref 1–4.8)
MCH RBC QN AUTO: 32.3 PG (ref 27–31)
MCHC RBC AUTO-ENTMCNC: 34.7 G/DL (ref 32–36)
MCV RBC AUTO: 93 FL (ref 82–98)
NONHDLC SERPL-MCNC: 83 MG/DL
NUCLEATED RBC (/100WBC) (OHS): 0 /100 WBC
PLATELET # BLD AUTO: 183 K/UL (ref 150–450)
PMV BLD AUTO: 10.9 FL (ref 9.2–12.9)
POTASSIUM SERPL-SCNC: 3.9 MMOL/L (ref 3.5–5.1)
PROT SERPL-MCNC: 6.8 GM/DL (ref 6–8.4)
RBC # BLD AUTO: 3.78 M/UL (ref 4–5.4)
RELATIVE EOSINOPHIL (OHS): 1.8 %
RELATIVE LYMPHOCYTE (OHS): 18.9 % (ref 18–48)
RELATIVE MONOCYTE (OHS): 6.4 % (ref 4–15)
RELATIVE NEUTROPHIL (OHS): 72.1 % (ref 38–73)
SODIUM SERPL-SCNC: 140 MMOL/L (ref 136–145)
TRIGL SERPL-MCNC: 90 MG/DL (ref 30–150)
TSH SERPL-ACNC: 2.04 UIU/ML (ref 0.4–4)
WBC # BLD AUTO: 6.6 K/UL (ref 3.9–12.7)

## 2025-08-20 PROCEDURE — 82728 ASSAY OF FERRITIN: CPT

## 2025-08-20 PROCEDURE — 85025 COMPLETE CBC W/AUTO DIFF WBC: CPT

## 2025-08-20 PROCEDURE — 80061 LIPID PANEL: CPT

## 2025-08-20 PROCEDURE — 83036 HEMOGLOBIN GLYCOSYLATED A1C: CPT

## 2025-08-20 PROCEDURE — 36415 COLL VENOUS BLD VENIPUNCTURE: CPT

## 2025-08-20 PROCEDURE — 84443 ASSAY THYROID STIM HORMONE: CPT

## 2025-08-20 PROCEDURE — 82040 ASSAY OF SERUM ALBUMIN: CPT

## 2025-09-04 ENCOUNTER — OFFICE VISIT (OUTPATIENT)
Dept: INTERNAL MEDICINE | Facility: CLINIC | Age: 60
End: 2025-09-04
Payer: COMMERCIAL

## 2025-09-04 ENCOUNTER — E-CONSULT (OUTPATIENT)
Dept: GASTROENTEROLOGY | Facility: HOSPITAL | Age: 60
End: 2025-09-04
Payer: COMMERCIAL

## 2025-09-04 VITALS
OXYGEN SATURATION: 98 % | WEIGHT: 150.56 LBS | HEART RATE: 88 BPM | RESPIRATION RATE: 19 BRPM | HEIGHT: 62 IN | DIASTOLIC BLOOD PRESSURE: 76 MMHG | SYSTOLIC BLOOD PRESSURE: 112 MMHG | BODY MASS INDEX: 27.7 KG/M2

## 2025-09-04 DIAGNOSIS — R74.8 ELEVATED LIVER ENZYMES: Primary | ICD-10-CM

## 2025-09-04 DIAGNOSIS — R74.8 ELEVATED ALKALINE PHOSPHATASE LEVEL: Primary | ICD-10-CM

## 2025-09-04 DIAGNOSIS — G20.A1 PARKINSON'S DISEASE, UNSPECIFIED WHETHER DYSKINESIA PRESENT, UNSPECIFIED WHETHER MANIFESTATIONS FLUCTUATE: ICD-10-CM

## 2025-09-04 DIAGNOSIS — Z87.81 HX OF FRACTURE OF FEMUR: ICD-10-CM

## 2025-09-04 DIAGNOSIS — R42 DIZZINESS: ICD-10-CM

## 2025-09-04 DIAGNOSIS — Z85.3 HISTORY OF BREAST CANCER: ICD-10-CM

## 2025-09-04 PROCEDURE — 3074F SYST BP LT 130 MM HG: CPT | Mod: CPTII,S$GLB,, | Performed by: NURSE PRACTITIONER

## 2025-09-04 PROCEDURE — 99451 NTRPROF PH1/NTRNET/EHR 5/>: CPT | Mod: ,,, | Performed by: INTERNAL MEDICINE

## 2025-09-04 PROCEDURE — 99999 PR PBB SHADOW E&M-EST. PATIENT-LVL IV: CPT | Mod: PBBFAC,,, | Performed by: NURSE PRACTITIONER

## 2025-09-04 PROCEDURE — 3078F DIAST BP <80 MM HG: CPT | Mod: CPTII,S$GLB,, | Performed by: NURSE PRACTITIONER

## 2025-09-04 PROCEDURE — 3008F BODY MASS INDEX DOCD: CPT | Mod: CPTII,S$GLB,, | Performed by: NURSE PRACTITIONER

## 2025-09-04 PROCEDURE — 3044F HG A1C LEVEL LT 7.0%: CPT | Mod: CPTII,S$GLB,, | Performed by: NURSE PRACTITIONER

## 2025-09-04 PROCEDURE — 4010F ACE/ARB THERAPY RXD/TAKEN: CPT | Mod: CPTII,S$GLB,, | Performed by: NURSE PRACTITIONER

## 2025-09-04 PROCEDURE — 99214 OFFICE O/P EST MOD 30 MIN: CPT | Mod: S$GLB,,, | Performed by: NURSE PRACTITIONER

## 2025-09-04 RX ORDER — VIT C/E/ZN/COPPR/LUTEIN/ZEAXAN 250MG-90MG
500 CAPSULE ORAL DAILY
COMMUNITY

## (undated) DEVICE — SPONGE SURGIFOAM 100 8.5X12X10